# Patient Record
Sex: MALE | Race: WHITE | Employment: OTHER | URBAN - METROPOLITAN AREA
[De-identification: names, ages, dates, MRNs, and addresses within clinical notes are randomized per-mention and may not be internally consistent; named-entity substitution may affect disease eponyms.]

---

## 2017-01-27 ENCOUNTER — HOSPITAL ENCOUNTER (OUTPATIENT)
Dept: INFUSION THERAPY | Age: 62
Discharge: HOME | End: 2017-01-27
Payer: COMMERCIAL

## 2017-01-27 VITALS
RESPIRATION RATE: 18 BRPM | HEART RATE: 70 BPM | DIASTOLIC BLOOD PRESSURE: 82 MMHG | TEMPERATURE: 97 F | OXYGEN SATURATION: 99 % | SYSTOLIC BLOOD PRESSURE: 144 MMHG

## 2017-01-27 LAB
BASOPHILS # BLD AUTO: 0.1 K/UL (ref 0–0.1)
BASOPHILS # BLD: 1 % (ref 0–1)
EOSINOPHIL # BLD: 0.2 K/UL (ref 0–0.4)
EOSINOPHIL NFR BLD: 2 % (ref 0–7)
ERYTHROCYTE [DISTWIDTH] IN BLOOD BY AUTOMATED COUNT: 16.8 % (ref 11.5–14.5)
HCT VFR BLD AUTO: 48.5 % (ref 36.6–50.3)
HGB BLD-MCNC: 15.8 G/DL (ref 12.1–17)
LYMPHOCYTES # BLD AUTO: 31 % (ref 12–49)
LYMPHOCYTES # BLD: 2.8 K/UL (ref 0.8–3.5)
MCH RBC QN AUTO: 23.6 PG (ref 26–34)
MCHC RBC AUTO-ENTMCNC: 32.6 G/DL (ref 30–36.5)
MCV RBC AUTO: 72.4 FL (ref 80–99)
MONOCYTES # BLD: 0.7 K/UL (ref 0–1)
MONOCYTES NFR BLD AUTO: 7 % (ref 5–13)
NEUTS SEG # BLD: 5.5 K/UL (ref 1.8–8)
NEUTS SEG NFR BLD AUTO: 59 % (ref 32–75)
PLATELET # BLD AUTO: 550 K/UL (ref 150–400)
RBC # BLD AUTO: 6.7 M/UL (ref 4.1–5.7)
WBC # BLD AUTO: 9.2 K/UL (ref 4.1–11.1)

## 2017-01-27 PROCEDURE — 85025 COMPLETE CBC W/AUTO DIFF WBC: CPT | Performed by: NURSE PRACTITIONER

## 2017-01-27 PROCEDURE — 36415 COLL VENOUS BLD VENIPUNCTURE: CPT | Performed by: NURSE PRACTITIONER

## 2017-01-27 PROCEDURE — 99195 PHLEBOTOMY: CPT

## 2017-01-27 NOTE — PROGRESS NOTES
Outpatient Infusion Center Progress Note    1600 Pt admit to [facility] for Therapeutic phlebotomy ambulatory in stable condition. Assessment completed. No new concerns voiced. CBC drawn and sent for processing. Labs revealed a phlebotomy is needed. Phlebotomy performed in the left arm and 450cc drained without difficulty. Needle removed and pressure applied for 10 minutes. Visit Vitals    /82    Pulse 70    Temp 97 °F (36.1 °C)    Resp 18    SpO2 99%       1735 Pt tolerated treatment well. D/c home ambulatory in no distress. Pt aware of next appointment scheduled for 2/10/17 at 10am    Recent Results (from the past 12 hour(s))   CBC WITH AUTOMATED DIFF    Collection Time: 01/27/17  4:07 PM   Result Value Ref Range    WBC 9.2 4.1 - 11.1 K/uL    RBC 6.70 (H) 4.10 - 5.70 M/uL    HGB 15.8 12.1 - 17.0 g/dL    HCT 48.5 36.6 - 50.3 %    MCV 72.4 (L) 80.0 - 99.0 FL    MCH 23.6 (L) 26.0 - 34.0 PG    MCHC 32.6 30.0 - 36.5 g/dL    RDW 16.8 (H) 11.5 - 14.5 %    PLATELET 849 (H) 489 - 400 K/uL    NEUTROPHILS 59 32 - 75 %    LYMPHOCYTES 31 12 - 49 %    MONOCYTES 7 5 - 13 %    EOSINOPHILS 2 0 - 7 %    BASOPHILS 1 0 - 1 %    ABS. NEUTROPHILS 5.5 1.8 - 8.0 K/UL    ABS. LYMPHOCYTES 2.8 0.8 - 3.5 K/UL    ABS. MONOCYTES 0.7 0.0 - 1.0 K/UL    ABS. EOSINOPHILS 0.2 0.0 - 0.4 K/UL    ABS.  BASOPHILS 0.1 0.0 - 0.1 K/UL

## 2017-02-06 RX ORDER — LISINOPRIL 5 MG/1
TABLET ORAL
Qty: 360 TAB | Refills: 0 | Status: SHIPPED | OUTPATIENT
Start: 2017-02-06 | End: 2017-06-19 | Stop reason: SDUPTHER

## 2017-02-06 RX ORDER — TRAMADOL HYDROCHLORIDE 50 MG/1
TABLET ORAL
Qty: 30 TAB | Refills: 0 | OUTPATIENT
Start: 2017-02-06 | End: 2018-12-04 | Stop reason: SDUPTHER

## 2017-02-10 ENCOUNTER — HOSPITAL ENCOUNTER (OUTPATIENT)
Dept: INFUSION THERAPY | Age: 62
Discharge: HOME | End: 2017-02-10
Payer: COMMERCIAL

## 2017-02-10 VITALS
HEART RATE: 72 BPM | DIASTOLIC BLOOD PRESSURE: 77 MMHG | OXYGEN SATURATION: 98 % | RESPIRATION RATE: 18 BRPM | SYSTOLIC BLOOD PRESSURE: 131 MMHG | TEMPERATURE: 97.1 F

## 2017-02-10 LAB
BASOPHILS # BLD AUTO: 0.1 K/UL (ref 0–0.1)
BASOPHILS # BLD: 1 % (ref 0–1)
DIFFERENTIAL METHOD BLD: ABNORMAL
EOSINOPHIL # BLD: 0.3 K/UL (ref 0–0.4)
EOSINOPHIL NFR BLD: 3 % (ref 0–7)
ERYTHROCYTE [DISTWIDTH] IN BLOOD BY AUTOMATED COUNT: 17.5 % (ref 11.5–14.5)
HCT VFR BLD AUTO: 42.8 % (ref 36.6–50.3)
HGB BLD-MCNC: 13.2 G/DL (ref 12.1–17)
LYMPHOCYTES # BLD AUTO: 26 % (ref 12–49)
LYMPHOCYTES # BLD: 2.2 K/UL (ref 0.8–3.5)
MCH RBC QN AUTO: 22.6 PG (ref 26–34)
MCHC RBC AUTO-ENTMCNC: 30.8 G/DL (ref 30–36.5)
MCV RBC AUTO: 73.4 FL (ref 80–99)
MONOCYTES # BLD: 0.7 K/UL (ref 0–1)
MONOCYTES NFR BLD AUTO: 8 % (ref 5–13)
NEUTS SEG # BLD: 5.1 K/UL (ref 1.8–8)
NEUTS SEG NFR BLD AUTO: 62 % (ref 32–75)
PLATELET # BLD AUTO: 531 K/UL (ref 150–400)
RBC # BLD AUTO: 5.83 M/UL (ref 4.1–5.7)
RBC MORPH BLD: ABNORMAL
WBC # BLD AUTO: 8.4 K/UL (ref 4.1–11.1)
WBC MORPH BLD: ABNORMAL

## 2017-02-10 PROCEDURE — 36415 COLL VENOUS BLD VENIPUNCTURE: CPT | Performed by: INTERNAL MEDICINE

## 2017-02-10 PROCEDURE — 85025 COMPLETE CBC W/AUTO DIFF WBC: CPT | Performed by: INTERNAL MEDICINE

## 2017-02-10 NOTE — PROGRESS NOTES
1600 Pt admit to [place] for Therapeutic Phlebotomy ambulatory in stable condition. Assessment completed. No new concerns voiced. Labs drawn peripherally and sent for processing. Visit Vitals    /77 (BP 1 Location: Left arm, BP Patient Position: At rest)    Pulse 72    Temp 97.1 °F (36.2 °C)    Resp 18    SpO2 98%       Medications:  none    1710 spoke with [facility], NP no treatment needed today. Patient aware of next appointment on 2/24/17. Recent Results (from the past 12 hour(s))   CBC WITH AUTOMATED DIFF    Collection Time: 02/10/17  4:11 PM   Result Value Ref Range    WBC 8.4 4.1 - 11.1 K/uL    RBC 5.83 (H) 4.10 - 5.70 M/uL    HGB 13.2 12.1 - 17.0 g/dL    HCT 42.8 36.6 - 50.3 %    MCV 73.4 (L) 80.0 - 99.0 FL    MCH 22.6 (L) 26.0 - 34.0 PG    MCHC 30.8 30.0 - 36.5 g/dL    RDW 17.5 (H) 11.5 - 14.5 %    PLATELET 606 (H) 671 - 400 K/uL    NEUTROPHILS 62 32 - 75 %    LYMPHOCYTES 26 12 - 49 %    MONOCYTES 8 5 - 13 %    EOSINOPHILS 3 0 - 7 %    BASOPHILS 1 0 - 1 %    ABS. NEUTROPHILS 5.1 1.8 - 8.0 K/UL    ABS. LYMPHOCYTES 2.2 0.8 - 3.5 K/UL    ABS. MONOCYTES 0.7 0.0 - 1.0 K/UL    ABS. EOSINOPHILS 0.3 0.0 - 0.4 K/UL    ABS.  BASOPHILS 0.1 0.0 - 0.1 K/UL    DF SMEAR SCANNED      RBC COMMENTS ANISOCYTOSIS  1+        RBC COMMENTS MICROCYTOSIS  1+        RBC COMMENTS HYPOCHROMIA  2+        RBC COMMENTS DARYL CELLS  PRESENT        RBC COMMENTS POLYCHROMASIA  PRESENT        WBC COMMENTS REACTIVE LYMPHS

## 2017-02-24 ENCOUNTER — APPOINTMENT (OUTPATIENT)
Dept: INFUSION THERAPY | Age: 62
End: 2017-02-24
Payer: COMMERCIAL

## 2017-03-01 ENCOUNTER — TELEPHONE (OUTPATIENT)
Dept: ONCOLOGY | Age: 62
End: 2017-03-01

## 2017-03-02 ENCOUNTER — TELEPHONE (OUTPATIENT)
Dept: ONCOLOGY | Age: 62
End: 2017-03-02

## 2017-03-02 NOTE — TELEPHONE ENCOUNTER
Patient should follow-up with PCP/[facility] for chronic conditions for which they have followed him in the past. He is also due for follow-up appointment with Dr. Dennis Baxter, please call to schedule.

## 2017-03-02 NOTE — TELEPHONE ENCOUNTER
Patient is requesting a standing order for labs be faxed to [facility] on [address], [place]  Fax: [phone]  Phone [phone]    Thanks you.

## 2017-03-02 NOTE — TELEPHONE ENCOUNTER
Standing order lab complete and to [facility] for scanning. HIPAA verified. Advised lab order to be faxed to [employer] per preference. Stated ongoing stabbing pain in head and behind knee for several months. Reported ongoing visual changes for 5 years. Stated has been seen by [place] for this for past 5 years. Advised to follow with PCP, but would forward to provider.   Verbalized understanding.    (8 min)

## 2017-03-06 ENCOUNTER — DOCUMENTATION ONLY (OUTPATIENT)
Dept: ONCOLOGY | Age: 62
End: 2017-03-06

## 2017-03-06 NOTE — PROGRESS NOTES
Per PSR patient calling and stated [facility] did not rec standing orders. Re-faxed with confirmation.

## 2017-03-07 LAB
BASOPHILS # BLD AUTO: 0.1 X10E3/UL (ref 0–0.2)
BASOPHILS NFR BLD AUTO: 1 %
EOSINOPHIL # BLD AUTO: 0.2 X10E3/UL (ref 0–0.4)
EOSINOPHIL NFR BLD AUTO: 2 %
ERYTHROCYTE [DISTWIDTH] IN BLOOD BY AUTOMATED COUNT: 16.9 % (ref 12.3–15.4)
HCT VFR BLD AUTO: 47.6 % (ref 37.5–51)
HGB BLD-MCNC: 15 G/DL (ref 12.6–17.7)
IMM GRANULOCYTES # BLD: 0 X10E3/UL (ref 0–0.1)
IMM GRANULOCYTES NFR BLD: 0 %
LYMPHOCYTES # BLD AUTO: 2.7 X10E3/UL (ref 0.7–3.1)
LYMPHOCYTES NFR BLD AUTO: 28 %
MCH RBC QN AUTO: 22.8 PG (ref 26.6–33)
MCHC RBC AUTO-ENTMCNC: 31.5 G/DL (ref 31.5–35.7)
MCV RBC AUTO: 72 FL (ref 79–97)
MONOCYTES # BLD AUTO: 0.8 X10E3/UL (ref 0.1–0.9)
MONOCYTES NFR BLD AUTO: 9 %
NEUTROPHILS # BLD AUTO: 5.6 X10E3/UL (ref 1.4–7)
NEUTROPHILS NFR BLD AUTO: 60 %
PLATELET # BLD AUTO: 616 X10E3/UL (ref 150–379)
RBC # BLD AUTO: 6.59 X10E6/UL (ref 4.14–5.8)
WBC # BLD AUTO: 9.4 X10E3/UL (ref 3.4–10.8)

## 2017-03-08 ENCOUNTER — TELEPHONE (OUTPATIENT)
Dept: ONCOLOGY | Age: 62
End: 2017-03-08

## 2017-03-08 ENCOUNTER — OFFICE VISIT (OUTPATIENT)
Dept: ONCOLOGY | Age: 62
End: 2017-03-08

## 2017-03-08 VITALS
DIASTOLIC BLOOD PRESSURE: 78 MMHG | HEART RATE: 71 BPM | TEMPERATURE: 97.9 F | RESPIRATION RATE: 16 BRPM | HEIGHT: 70 IN | WEIGHT: 204.2 LBS | BODY MASS INDEX: 29.23 KG/M2 | SYSTOLIC BLOOD PRESSURE: 118 MMHG | OXYGEN SATURATION: 97 %

## 2017-03-08 DIAGNOSIS — D45 POLYCYTHEMIA VERA (HCC): Primary | Chronic | ICD-10-CM

## 2017-03-08 DIAGNOSIS — D75.839 THROMBOCYTOSIS: ICD-10-CM

## 2017-03-08 NOTE — MR AVS SNAPSHOT
Visit Information Date & Time Provider Department Dept. Phone Encounter #  
 3/8/2017  1:30 PM Angel Elizalde, [address] Oncology at [facility] [phone] Follow-up Instructions Return in about 6 months (around 9/8/2017). Routing History Follow-up and Disposition History Upcoming Health Maintenance Date Due Hepatitis C Screening 1955 DTaP/Tdap/Td series (1 - Tdap) 7/24/2004 Pneumococcal 19-64 Highest Risk (3 of 3 - PCV13) 1/12/2017 COLONOSCOPY 1/19/2025 Allergies as of 3/8/2017  Review Complete On: 3/8/2017 By: Angel Elizalde,  Severity Noted Reaction Type Reactions Sulfur High 06/15/2011   Systemic Anaphylaxis, Hives, Seizures Zyprexa [Olanzapine] High 07/23/2013    Anaphylaxis Celexa [Citalopram]  07/23/2013    Other (comments) groggy Clindamycin  07/23/2013    Nausea and Vomiting Lisinopril  02/23/2015    Other (comments) Prozac [Fluoxetine]  07/23/2013    Anxiety Risperidone  07/23/2013    Anxiety Current Immunizations  Reviewed on 1/27/2017 Name Date Influenza High Dose Vaccine PF 11/12/2015 Influenza Vaccine 10/23/2012 Influenza Vaccine Janyth Charlie) 10/13/2016 Influenza Vaccine PF 9/30/2013 Pneumococcal Polysaccharide (PPSV-23) 1/12/2016 Pneumococcal Vaccine (Unspecified Type) 7/23/2012 Td 7/23/2004 Not reviewed this visit You Were Diagnosed With   
  
 Codes Comments Polycythemia vera ([email or web address] 75.)    -  Primary ICD-10-CM: F55 ICD-9-CM: 331. 4 Thrombocytosis ([facility]     ICD-10-CM: D47.3 ICD-9-CM: 238.71 Vitals BP Pulse Temp Resp Height(growth percentile) Weight(growth percentile) 118/78 (BP 1 Location: Right arm, BP Patient Position: Sitting) 71 97.9 °F (36.6 °C) (Oral) 16 5' 10\" (1.778 m) 204 lb 3.2 oz (92.6 kg) SpO2 BMI Smoking Status 97% 29.3 kg/m2 Current Every Day Smoker Vitals History BMI and BSA Data Body Mass Index Body Surface Area  
 29.3 kg/m 2 2.14 m 2 Preferred Pharmacy Pharmacy Name Phone [facility] [facility],  Hermelinda Longo [address] [place] [address] [phone] Your Updated Medication List  
  
   
This list is accurate as of: 3/8/17  2:14 PM.  Always use your most recent med list.  
  
  
  
  
 aspirin delayed-release 81 mg tablet Take  by mouth daily. Calcium-Mag-Vit B6-D3-Minerals 006-01-6-125 wy-nb-oc-unit Tab Take  by mouth daily. CENTRUM SILVER ULTRA MEN'S PO Take  by mouth.  
  
 cyanocobalamin 1,000 mcg tablet Take 1,000 mcg by mouth daily. lisinopril 5 mg tablet Commonly known as:  PRINIVIL, ZESTRIL  
TAKE 1 TO 4 TABLETS BY MOUTH DAILY FOR BLOOD PRESSURE READINGS AS DIRECTED  
  
 tamsulosin 0.4 mg capsule Commonly known as:  FLOMAX Take 0.4 mg by mouth daily. traMADol 50 mg tablet Commonly known as:  ULTRAM  
TAKE 1 TABLET BY MOUTH EVERY 6 HOURS AS NEEDED FOR PAIN. MAX 200MG IN 1 DAY. VITAMIN D3 2,000 unit Tab Generic drug:  cholecalciferol (vitamin D3) Take  by mouth daily. We Performed the Following FERRITIN [18866 CPT(R)] IRON PROFILE S5493262 CPT(R)] METABOLIC PANEL, COMPREHENSIVE [82269 CPT(R)] Follow-up Instructions Return in about 6 months (around 9/8/2017). To-Do List   
 03/24/2017 4:00 PM  
  Appointment with [facility] ([phone])  
  
 04/21/2017 4:00 PM  
  Appointment with [facility] ([phone])  
  
 05/19/2017 4:00 PM  
  Appointment with [facility] ([phone])  
  
 06/16/2017 4:00 PM  
  Appointment with [facility] ([phone]) Introducing [facility]! Dear Cesar : Thank you for requesting a [employer] account. Our records indicate that you already have an active [employer] account.   You can access your account anytime at [email or web address]. [email or web address]/"[employer] Did you know that you can access your hospital and ER discharge instructions at any time in [employer]? You can also review all of your test results from your hospital stay or ER visit. Additional Information If you have questions, please visit the Frequently Asked Questions section of the [employer] website at [email or web address] Remember, [employer] is NOT to be used for urgent needs. For medical emergencies, dial 911. Now available from your iPhone and Android! Please provide this summary of care documentation to your next provider. Your primary care clinician is listed as TIMUR LANDAVERDE. If you have any questions after today's visit, please call [phone].

## 2017-03-08 NOTE — PROGRESS NOTES
HEME/ONC CONSULT     Blue Rodas is a 63 y.o. 1955 male and presents with Other (Polycythemia)    CC  Polycythemia chronic 2006    HPI  Pt here for polycythemia 6 mo f/u on phlebotomy. Pt has multiple medical problems and sees private PCP/ doctors and [address] [place] [address]. Pt sees heme at [address] [place] [address] also twice a year still. Last visit:  Pt is a [facility] pt. Repeat bone marrow showed PV again. Pt has report. Pt has multiple symptoms chronically. Chronic HAs/ fatigue/ numbness/ vertigo/ anxiety. Pt is scared of falls. Pt goes to [facility]. Pt sees PCP outside [address] [place] [address] regularly. Pt monitors his H/H and phlebotomy based on symptoms. Pt is doing well here overall. DX   Encounter Diagnoses   Name Primary?     Polycythemia vera ([facility] Yes    Thrombocytosis (HCC)         Past Medical History:   Diagnosis Date    Depression     GERD (gastroesophageal reflux disease)     Hypertension     Liver disease 1984    Fatty liver    Other ill-defined conditions(799.89)     gallstones    Polycythemia vera(238.4) 4/29/2013    Prostatitis     Sleep apnea 12/2011    doesn't use c-pap;  lost 25 lbs and has improved    Vertigo      Past Surgical History:   Procedure Laterality Date    HX CHOLECYSTECTOMY      HX COLONOSCOPY      HX ORTHOPAEDIC  1970    left wrist compound fracture    HX OTHER SURGICAL  2013    molar removal (dental)     Social History     Social History    Marital status:      Spouse name: N/A    Number of children: N/A    Years of education: N/A     Social History Main Topics    Smoking status: Current Every Day Smoker     Packs/day: 1.00     Years: 40.00    Smokeless tobacco: Never Used    Alcohol use No    Drug use: Yes     Special: Marijuana      Comment: rare    Sexual activity: No     Other Topics Concern    None     Social History Narrative     Family History   Problem Relation Age of Onset    Cancer Father     Heart Disease Father     Heart Disease Mother     Kidney Disease Mother     Diabetes Brother        Current Outpatient Prescriptions   Medication Sig Dispense Refill    traMADol (ULTRAM) 50 mg tablet TAKE 1 TABLET BY MOUTH EVERY 6 HOURS AS NEEDED FOR PAIN. MAX 200MG IN 1 DAY. 30 Tab 0    lisinopril (PRINIVIL, ZESTRIL) 5 mg tablet TAKE 1 TO 4 TABLETS BY MOUTH DAILY FOR BLOOD PRESSURE READINGS AS DIRECTED 360 Tab 0    cholecalciferol, vitamin D3, (VITAMIN D3) 2,000 unit tab Take  by mouth daily.  aspirin delayed-release 81 mg tablet Take  by mouth daily.  cyanocobalamin 1,000 mcg tablet Take 1,000 mcg by mouth daily.  Calcium-Mag-Vit B6-D3-Minerals 030-51-3-243 ll-yx-af-unit tab Take  by mouth daily.  tamsulosin (FLOMAX) 0.4 mg capsule Take 0.4 mg by mouth daily.  MULTIVIT-MIN/FA/LYCOPENE/LUT (CENTRUM SILVER ULTRA MEN'S PO) Take  by mouth. Allergies   Allergen Reactions    Sulfur Anaphylaxis, Hives and Seizures    Zyprexa [Olanzapine] Anaphylaxis    Celexa [Citalopram] Other (comments)     groggy    Clindamycin Nausea and Vomiting    Lisinopril Other (comments)    Prozac [Fluoxetine] Anxiety    Risperidone Anxiety       Review of Systems    A comprehensive review of systems was negative except for: per HPI  multiple symptoms     Objective:  Visit Vitals    /78 (BP 1 Location: Right arm, BP Patient Position: Sitting)    Pulse 71    Temp 97.9 °F (36.6 °C) (Oral)    Resp 16    Ht 5' 10\" (1.778 m)    Wt 204 lb 3.2 oz (92.6 kg)    SpO2 97%    BMI 29.3 kg/m2         Physical Exam:   General appearance - alert, well nourished  Mental status - appropriately conversant. EYE conj clear  Mouth - mucous membranes moist  Neck - supple  Ext-no pedal edema noted  Skin-Warm and dry. Neuro -nonfocal      Diagnostic Imaging   reviewed  Results for orders placed during the hospital encounter of 04/14/14   XR CHEST PA LAT    Narrative **Final Report**       ICD Codes / Adm. Diagnosis: 789.09  787.02 / pre op    Examination:  CR CHEST PA AND LATERAL  - [id number] - Apr 14 2014 10:18AM  Accession No:  [id number]  Reason:  preop      REPORT:  Indication: Congestion, cough, hypertension. Exam: PA and lateral views of the chest.    There is no prior study for direct comparison. Findings: Cardiomediastinal silhouette is within normal limits. Lungs are   clear bilaterally. Pleural spaces are normal. Osseous structures are intact. IMPRESSION: No acute cardiopulmonary disease. Signing/Reading Doctor: MELISSA Pino ([id number])    Approved: MELISSA RICARDO ([id number])  Apr 14 2014 10:28AM                                          Lab Results    reviewed  Lab Results   Component Value Date/Time    WBC 9.4 03/06/2017 09:21 AM    HGB 15.0 03/06/2017 09:21 AM    HCT 47.6 03/06/2017 09:21 AM    PLATELET 942 43/26/8101 09:21 AM    MCV 72 03/06/2017 09:21 AM       Lab Results   Component Value Date/Time    Sodium 139 09/16/2016 05:18 PM    Potassium 4.0 09/16/2016 05:18 PM    Chloride 106 09/16/2016 05:18 PM    CO2 23 09/16/2016 05:18 PM    Anion gap 10 09/16/2016 05:18 PM    Glucose 204 09/16/2016 05:18 PM    BUN 18 09/16/2016 05:18 PM    Creatinine 0.95 09/16/2016 05:18 PM    BUN/Creatinine ratio 19 09/16/2016 05:18 PM    GFR est AA >60 09/16/2016 05:18 PM    GFR est non-AA >60 09/16/2016 05:18 PM    Calcium 8.4 09/16/2016 05:18 PM    AST (SGOT) 14 09/16/2016 05:18 PM    Alk. phosphatase 76 09/16/2016 05:18 PM    Protein, total 6.7 09/16/2016 05:18 PM    Albumin 3.7 09/16/2016 05:18 PM    Globulin 3.0 09/16/2016 05:18 PM    A-G Ratio 1.2 09/16/2016 05:18 PM    ALT (SGPT) 32 09/16/2016 05:18 PM       .    Assessment/Plan:     Enma Prince is a 56 y.o. 1955 male and presents with Abnormal Lab Results  . CC  poythyemia    HPI  Pt here for polycythemia f/u. DX  Polycythemia vera jose 2 +    1. chronic polycythemia vera jose 2 + dx at [facility] years ago. Pt had a repeat BM here that showed PV. H/H has been stable.   Platelets up a bit so will check iron studies. Continue same treatment plan.      labs q 4 weeks    Phlebotomy for 44 or greater per pt request as was done at the [facility] prior to here. Goal is to spread out phlebotomy as possible. 2.  Other chronic / medical problems per PCP/ [facility]. Call if questions. F/u with me in 6 months. ICD-10-CM ICD-9-CM    1. Polycythemia vera ([facility] F07 203.0 METABOLIC PANEL, COMPREHENSIVE      FERRITIN      IRON PROFILE   2. [facility]romb[facility] ([facility] D47.3 238.71      Orders Placed This Encounter    METABOLIC PANEL, COMPREHENSIVE    FERRITIN    IRON PROFILE       routine labs ordered, call if any problems  There are no Patient Instructions on file for this visit. Follow-up Disposition:  Return in about 6 months (around 9/8/2017).     Haydee Padilla DO

## 2017-03-08 NOTE — TELEPHONE ENCOUNTER
Voicemail: 3/6/17 @ 9:50 am    Patient calling to thank Nadia Chaidez for writing the fax to [facility], he got it done this morning. Patient also states that something is happening behind his knees. He is having a pain there. He is also experiencing numbness in 4 fingers on both hands. He wants to report this as it is alarming and is waking him up from his sleep. He has also had sever headaches for the last 2 weeks. Please call the patient back at [phone].  Thanks

## 2017-03-08 NOTE — PROGRESS NOTES
Chief Complaint   Patient presents with    Other     Polycythemia     1. Have you been to the ER, urgent care clinic since your last visit? Hospitalized since your last visit? No    2. Have you seen or consulted any other health care providers outside of the [address] [place] [address] since your last visit? Include any pap smears or colon screening.  [facility] 10/2016

## 2017-03-09 ENCOUNTER — TELEPHONE (OUTPATIENT)
Dept: ONCOLOGY | Age: 62
End: 2017-03-09

## 2017-03-09 ENCOUNTER — HOSPITAL ENCOUNTER (OUTPATIENT)
Dept: INFUSION THERAPY | Age: 62
Discharge: HOME | End: 2017-03-09
Payer: COMMERCIAL

## 2017-03-09 VITALS
HEART RATE: 78 BPM | TEMPERATURE: 97.7 F | RESPIRATION RATE: 18 BRPM | DIASTOLIC BLOOD PRESSURE: 78 MMHG | SYSTOLIC BLOOD PRESSURE: 139 MMHG | OXYGEN SATURATION: 98 %

## 2017-03-09 LAB
ALBUMIN SERPL BCP-MCNC: 4 G/DL (ref 3.5–5)
ALBUMIN/GLOB SERPL: 1.1 {RATIO} (ref 1.1–2.2)
ALP SERPL-CCNC: 93 U/L (ref 45–117)
ALT SERPL-CCNC: 34 U/L (ref 12–78)
ANION GAP BLD CALC-SCNC: 10 MMOL/L (ref 5–15)
AST SERPL W P-5'-P-CCNC: 11 U/L (ref 15–37)
BILIRUB SERPL-MCNC: 0.4 MG/DL (ref 0.2–1)
BUN SERPL-MCNC: 21 MG/DL (ref 6–20)
BUN/CREAT SERPL: 17 (ref 12–20)
CALCIUM SERPL-MCNC: 9.2 MG/DL (ref 8.5–10.1)
CHLORIDE SERPL-SCNC: 102 MMOL/L (ref 97–108)
CO2 SERPL-SCNC: 27 MMOL/L (ref 21–32)
CREAT SERPL-MCNC: 1.21 MG/DL (ref 0.7–1.3)
FERRITIN SERPL-MCNC: 6 NG/ML (ref 26–388)
GLOBULIN SER CALC-MCNC: 3.5 G/DL (ref 2–4)
GLUCOSE SERPL-MCNC: 138 MG/DL (ref 65–100)
IRON SATN MFR SERPL: 4 % (ref 20–50)
IRON SERPL-MCNC: 21 UG/DL (ref 35–150)
POTASSIUM SERPL-SCNC: 4.1 MMOL/L (ref 3.5–5.1)
PROT SERPL-MCNC: 7.5 G/DL (ref 6.4–8.2)
SODIUM SERPL-SCNC: 139 MMOL/L (ref 136–145)
TIBC SERPL-MCNC: 473 UG/DL (ref 250–450)

## 2017-03-09 PROCEDURE — 36415 COLL VENOUS BLD VENIPUNCTURE: CPT | Performed by: INTERNAL MEDICINE

## 2017-03-09 PROCEDURE — 80053 COMPREHEN METABOLIC PANEL: CPT | Performed by: INTERNAL MEDICINE

## 2017-03-09 PROCEDURE — 83540 ASSAY OF IRON: CPT | Performed by: INTERNAL MEDICINE

## 2017-03-09 PROCEDURE — 82728 ASSAY OF FERRITIN: CPT | Performed by: INTERNAL MEDICINE

## 2017-03-09 PROCEDURE — 99195 PHLEBOTOMY: CPT

## 2017-03-09 NOTE — PROGRESS NOTES
[facility]ataly FRANKEL Progress Note    Date: 2017    Name: Aracelis Perry    MRN: [id number]         : 1955      Mr. Dorisann Prader was assessed and education was provided. Pt arrived to infusion center for scheduled therapeutic phlebotomy. Labs drawn on 2017 Hematocrit 47.6, orders received to perform phlebotomy until Hct less than 45. Pt with some generalized complaints of numbness and tingling in hands and painful acne like rash to face, one raised bump noted above right eye, no other noted. Pt recently referred to neurology for numbness and tingling. Mr. Alf Oconnell vitals were reviewed and patient was observed for 5 minutes prior to treatment. Visit Vitals    /78    Pulse 78    Temp 97.7 °F (36.5 °C)    Resp 18    SpO2 98%       Lab results were obtained and reviewed. Recent Results (from the past 12 hour(s))   METABOLIC PANEL, COMPREHENSIVE    Collection Time: 17  3:15 PM   Result Value Ref Range    Sodium 139 136 - 145 mmol/L    Potassium 4.1 3.5 - 5.1 mmol/L    Chloride 102 97 - 108 mmol/L    CO2 27 21 - 32 mmol/L    Anion gap 10 5 - 15 mmol/L    Glucose 138 (H) 65 - 100 mg/dL    BUN 21 (H) 6 - 20 MG/DL    Creatinine 1.21 0.70 - 1.30 MG/DL    BUN/Creatinine ratio 17 12 - 20      GFR est AA >60 >60 ml/min/1.73m2    GFR est non-AA >60 >60 ml/min/1.73m2    Calcium 9.2 8.5 - 10.1 MG/DL    Bilirubin, total 0.4 0.2 - 1.0 MG/DL    ALT (SGPT) 34 12 - 78 U/L    AST (SGOT) 11 (L) 15 - 37 U/L    Alk. phosphatase 93 45 - 117 U/L    Protein, total 7.5 6.4 - 8.2 g/dL    Albumin 4.0 3.5 - 5.0 g/dL    Globulin 3.5 2.0 - 4.0 g/dL    A-G Ratio 1.1 1.1 - 2.2     IRON PROFILE    Collection Time: 17  3:15 PM   Result Value Ref Range    Iron 21 (L) 35 - 150 ug/dL    TIBC 473 (H) 250 - 450 ug/dL    Iron % saturation 4 (L) 20 - 50 %   FERRITIN    Collection Time: 17  3:15 PM   Result Value Ref Range    Ferritin 6 (L) 26 - 388 NG/ML     # 16 gauge started to left forearm X 1 attempt.  Phlebotomy started at 1310 ended at 1325, 1 unit whole blood removed as ordered. Additional labs drawn from line during phlebotomy. IV site discontinued, wrapped in COBAN    Pt drank appr 200 cc water during treatment, no orthostatic changes noted to blood pressure upon discharge. Mr. Dorisann Prader tolerated ,and had no complaints. Patient armband removed and shredded. Mr. Dorisann Prader was discharged from [place] [address] in stable condition at [phone] 16. He is to return on 03/24/2017 at 1600 for his next appointment.     Kelsy Tijerina RN  March 9, 2017  4:23 PM

## 2017-03-09 NOTE — TELEPHONE ENCOUNTER
Voicemail: 3/9/17 @ 2:45 pm     Patient calling to let us know that the insurance on the lab slip is incorrect. The Julito Harjinder is correct but the plan type is incorrect. It is supposed to be a HMO instead on PPO.

## 2017-03-24 ENCOUNTER — HOSPITAL ENCOUNTER (OUTPATIENT)
Dept: INFUSION THERAPY | Age: 62
Discharge: HOME | End: 2017-03-24
Payer: COMMERCIAL

## 2017-03-24 VITALS
RESPIRATION RATE: 18 BRPM | DIASTOLIC BLOOD PRESSURE: 75 MMHG | HEART RATE: 63 BPM | OXYGEN SATURATION: 97 % | TEMPERATURE: 98.6 F | SYSTOLIC BLOOD PRESSURE: 122 MMHG

## 2017-03-24 LAB
BASOPHILS # BLD AUTO: 0.1 K/UL (ref 0–0.1)
BASOPHILS # BLD: 1 % (ref 0–1)
EOSINOPHIL # BLD: 0.2 K/UL (ref 0–0.4)
EOSINOPHIL NFR BLD: 2 % (ref 0–7)
ERYTHROCYTE [DISTWIDTH] IN BLOOD BY AUTOMATED COUNT: 17.2 % (ref 11.5–14.5)
HCT VFR BLD AUTO: 42.3 % (ref 36.6–50.3)
HGB BLD-MCNC: 13.3 G/DL (ref 12.1–17)
LYMPHOCYTES # BLD AUTO: 26 % (ref 12–49)
LYMPHOCYTES # BLD: 2.5 K/UL (ref 0.8–3.5)
MCH RBC QN AUTO: 22.4 PG (ref 26–34)
MCHC RBC AUTO-ENTMCNC: 31.4 G/DL (ref 30–36.5)
MCV RBC AUTO: 71.3 FL (ref 80–99)
MONOCYTES # BLD: 0.5 K/UL (ref 0–1)
MONOCYTES NFR BLD AUTO: 5 % (ref 5–13)
NEUTS SEG # BLD: 6.4 K/UL (ref 1.8–8)
NEUTS SEG NFR BLD AUTO: 66 % (ref 32–75)
PLATELET # BLD AUTO: 554 K/UL (ref 150–400)
RBC # BLD AUTO: 5.93 M/UL (ref 4.1–5.7)
WBC # BLD AUTO: 9.7 K/UL (ref 4.1–11.1)

## 2017-03-24 PROCEDURE — 36415 COLL VENOUS BLD VENIPUNCTURE: CPT | Performed by: INTERNAL MEDICINE

## 2017-03-24 PROCEDURE — 85025 COMPLETE CBC W/AUTO DIFF WBC: CPT | Performed by: INTERNAL MEDICINE

## 2017-03-24 NOTE — PROGRESS NOTES
1555 Pt admit to [facility] for Therapeutic Phlebotomy ambulatory in stable condition. Assessment completed. No new concerns voiced. Labs drawn per order and sent for processing. Labs reviewed no phlebotomy required. Visit Vitals    /75 (BP 1 Location: Left arm, BP Patient Position: Sitting)    Pulse 63    Temp 98.6 °F (37 °C)    Resp 18    SpO2 97%         1630 Pt tolerated treatment well. D/c home ambulatory in no distress. Pt aware of next appointment scheduled for 4/21/17. Recent Results (from the past 12 hour(s))   CBC WITH AUTOMATED DIFF    Collection Time: 03/24/17  4:02 PM   Result Value Ref Range    WBC 9.7 4.1 - 11.1 K/uL    RBC 5.93 (H) 4.10 - 5.70 M/uL    HGB 13.3 12.1 - 17.0 g/dL    HCT 42.3 36.6 - 50.3 %    MCV 71.3 (L) 80.0 - 99.0 FL    MCH 22.4 (L) 26.0 - 34.0 PG    MCHC 31.4 30.0 - 36.5 g/dL    RDW 17.2 (H) 11.5 - 14.5 %    PLATELET 073 (H) 248 - 400 K/uL    NEUTROPHILS 66 32 - 75 %    LYMPHOCYTES 26 12 - 49 %    MONOCYTES 5 5 - 13 %    EOSINOPHILS 2 0 - 7 %    BASOPHILS 1 0 - 1 %    ABS. NEUTROPHILS 6.4 1.8 - 8.0 K/UL    ABS. LYMPHOCYTES 2.5 0.8 - 3.5 K/UL    ABS. MONOCYTES 0.5 0.0 - 1.0 K/UL    ABS. EOSINOPHILS 0.2 0.0 - 0.4 K/UL    ABS.  BASOPHILS 0.1 0.0 - 0.1 K/UL

## 2017-04-20 ENCOUNTER — TELEPHONE (OUTPATIENT)
Dept: ONCOLOGY | Age: 62
End: 2017-04-20

## 2017-04-20 NOTE — TELEPHONE ENCOUNTER
Received call from patient, HIPAA verified. Patient states he had appointment at the [address] [place] [address] today and they zev labs. Has phlebotomy appointment at infusion tomorrow 4/21. Per patient labs show need for phlebotomy. Patient would like to use lab results from todays appointment for visit tomorrow, states he does not like to be \"stuck\" so many times. Will bring with him to appointment tomorrow. Forwarded to provider for review.

## 2017-04-20 NOTE — TELEPHONE ENCOUNTER
Fine to use labs that were drawn yesterday but they need to scanned into system and reviewed prior to phlebotomy.

## 2017-04-21 ENCOUNTER — HOSPITAL ENCOUNTER (OUTPATIENT)
Dept: INFUSION THERAPY | Age: 62
Discharge: HOME | End: 2017-04-21
Payer: COMMERCIAL

## 2017-04-21 VITALS
SYSTOLIC BLOOD PRESSURE: 134 MMHG | TEMPERATURE: 96.8 F | DIASTOLIC BLOOD PRESSURE: 78 MMHG | HEART RATE: 67 BPM | RESPIRATION RATE: 18 BRPM

## 2017-04-21 PROCEDURE — 99195 PHLEBOTOMY: CPT

## 2017-04-21 NOTE — PROGRESS NOTES
Patient had labs drawn on 4/20/17 HCT 47.3, copy of labs scanned. Orders are to maintain Hct less than 45. Therapeutic phlebotomy performed in left antecubital with # 16 gauge needle and single blood pack. 500 ml. of blood obtained. Needle removed. 2 x 2s and pressure applied, and arm elevated. 2 x2s secured with Coban. Pt. tolerated procedure well. Patient offered a drink and snack. For observation for 30 minutes. Blood pressure 134/78, pulse 67, temperature 96.8 °F (36 °C), resp. rate 18.

## 2017-05-19 ENCOUNTER — TELEPHONE (OUTPATIENT)
Dept: ONCOLOGY | Age: 62
End: 2017-05-19

## 2017-05-19 ENCOUNTER — HOSPITAL ENCOUNTER (OUTPATIENT)
Dept: INFUSION THERAPY | Age: 62
Discharge: HOME | End: 2017-05-19
Payer: COMMERCIAL

## 2017-05-19 VITALS
RESPIRATION RATE: 18 BRPM | DIASTOLIC BLOOD PRESSURE: 77 MMHG | SYSTOLIC BLOOD PRESSURE: 122 MMHG | TEMPERATURE: 97 F | HEART RATE: 70 BPM | OXYGEN SATURATION: 96 %

## 2017-05-19 LAB
BASOPHILS # BLD AUTO: 0.1 K/UL (ref 0–0.1)
BASOPHILS # BLD: 1 % (ref 0–1)
DIFFERENTIAL METHOD BLD: ABNORMAL
EOSINOPHIL # BLD: 0.3 K/UL (ref 0–0.4)
EOSINOPHIL NFR BLD: 3 % (ref 0–7)
ERYTHROCYTE [DISTWIDTH] IN BLOOD BY AUTOMATED COUNT: 17.1 % (ref 11.5–14.5)
HCT VFR BLD AUTO: 42.6 % (ref 36.6–50.3)
HGB BLD-MCNC: 13.3 G/DL (ref 12.1–17)
LYMPHOCYTES # BLD AUTO: 34 % (ref 12–49)
LYMPHOCYTES # BLD: 3.3 K/UL (ref 0.8–3.5)
MCH RBC QN AUTO: 21.6 PG (ref 26–34)
MCHC RBC AUTO-ENTMCNC: 31.2 G/DL (ref 30–36.5)
MCV RBC AUTO: 69.2 FL (ref 80–99)
MONOCYTES # BLD: 0.6 K/UL (ref 0–1)
MONOCYTES NFR BLD AUTO: 6 % (ref 5–13)
NEUTS SEG # BLD: 5.3 K/UL (ref 1.8–8)
NEUTS SEG NFR BLD AUTO: 56 % (ref 32–75)
PLATELET # BLD AUTO: 591 K/UL (ref 150–400)
RBC # BLD AUTO: 6.16 M/UL (ref 4.1–5.7)
RBC MORPH BLD: ABNORMAL
WBC # BLD AUTO: 9.6 K/UL (ref 4.1–11.1)

## 2017-05-19 PROCEDURE — 85025 COMPLETE CBC W/AUTO DIFF WBC: CPT | Performed by: NURSE PRACTITIONER

## 2017-05-19 PROCEDURE — 36415 COLL VENOUS BLD VENIPUNCTURE: CPT | Performed by: NURSE PRACTITIONER

## 2017-05-19 NOTE — TELEPHONE ENCOUNTER
Patient needs standing order for CBC faxed to [facility].     Please fax to [phone]

## 2017-05-19 NOTE — PROGRESS NOTES
1520: Pt admit to [facility] for Therapeutic Phlebotomy ambulatory in stable condition. Assessment completed. No new concerns voiced. Labs drawn per order and sent for processing. Labs reviewed no phlebotomy required. Visit Vitals    /77 (BP 1 Location: Left arm, BP Patient Position: Sitting)    Pulse 70    Temp 97 °F (36.1 °C)    Resp 18    SpO2 96%     Recent Results (from the past 12 hour(s))   CBC WITH AUTOMATED DIFF    Collection Time: 05/19/17  3:28 PM   Result Value Ref Range    WBC 9.6 4.1 - 11.1 K/uL    RBC 6.16 (H) 4.10 - 5.70 M/uL    HGB 13.3 12.1 - 17.0 g/dL    HCT 42.6 36.6 - 50.3 %    MCV 69.2 (L) 80.0 - 99.0 FL    MCH 21.6 (L) 26.0 - 34.0 PG    MCHC 31.2 30.0 - 36.5 g/dL    RDW 17.1 (H) 11.5 - 14.5 %    PLATELET 034 (H) 784 - 400 K/uL    NEUTROPHILS PENDING %    LYMPHOCYTES PENDING %    MONOCYTES PENDING %    EOSINOPHILS PENDING %    BASOPHILS PENDING %    ABS. NEUTROPHILS PENDING K/UL    ABS. LYMPHOCYTES PENDING K/UL    ABS. MONOCYTES PENDING K/UL    ABS. EOSINOPHILS PENDING K/UL    ABS. BASOPHILS PENDING K/UL    DF PENDING      1600: Pt D/Cd from [facility] ambulatory and in no distress. Next appt 6/16/17.

## 2017-05-22 NOTE — TELEPHONE ENCOUNTER
Has current standing order for CBC dated 3/3/17 which is good for 6 months. Current order re-faxed to number below/confirmation.

## 2017-06-15 LAB
BASOPHILS # BLD AUTO: 0.1 X10E3/UL (ref 0–0.2)
BASOPHILS NFR BLD AUTO: 1 %
EOSINOPHIL # BLD AUTO: 0.2 X10E3/UL (ref 0–0.4)
EOSINOPHIL NFR BLD AUTO: 2 %
ERYTHROCYTE [DISTWIDTH] IN BLOOD BY AUTOMATED COUNT: 17.6 % (ref 12.3–15.4)
HCT VFR BLD AUTO: 45.6 % (ref 37.5–51)
HGB BLD-MCNC: 13.8 G/DL (ref 12.6–17.7)
IMM GRANULOCYTES # BLD: 0 X10E3/UL (ref 0–0.1)
IMM GRANULOCYTES NFR BLD: 0 %
LYMPHOCYTES # BLD AUTO: 2.1 X10E3/UL (ref 0.7–3.1)
LYMPHOCYTES NFR BLD AUTO: 28 %
MCH RBC QN AUTO: 20.9 PG (ref 26.6–33)
MCHC RBC AUTO-ENTMCNC: 30.3 G/DL (ref 31.5–35.7)
MCV RBC AUTO: 69 FL (ref 79–97)
MONOCYTES # BLD AUTO: 0.4 X10E3/UL (ref 0.1–0.9)
MONOCYTES NFR BLD AUTO: 5 %
NEUTROPHILS # BLD AUTO: 4.9 X10E3/UL (ref 1.4–7)
NEUTROPHILS NFR BLD AUTO: 64 %
PLATELET # BLD AUTO: 597 X10E3/UL (ref 150–379)
RBC # BLD AUTO: 6.6 X10E6/UL (ref 4.14–5.8)
WBC # BLD AUTO: 7.6 X10E3/UL (ref 3.4–10.8)

## 2017-06-16 ENCOUNTER — HOSPITAL ENCOUNTER (OUTPATIENT)
Dept: INFUSION THERAPY | Age: 62
Discharge: HOME | End: 2017-06-16
Payer: COMMERCIAL

## 2017-06-16 VITALS
HEART RATE: 89 BPM | TEMPERATURE: 98.1 F | OXYGEN SATURATION: 96 % | RESPIRATION RATE: 18 BRPM | DIASTOLIC BLOOD PRESSURE: 78 MMHG | SYSTOLIC BLOOD PRESSURE: 127 MMHG

## 2017-06-16 PROCEDURE — 99195 PHLEBOTOMY: CPT

## 2017-06-16 NOTE — PROGRESS NOTES
Outpatient Infusion Center Progress Note    1600 Pt admit to [facility] for Therapeutic Phlebotomy ambulatory in stable condition. Assessment completed. No new concerns voiced. Labs drawn on 6-14 show hct of 45.6. Phlebotomy preformed via right posterior forearm, 500 cc of blood drained without difficulty. Needle removed, pressure applied for 10 min and wrapped with coban, VSS. Visit Vitals    /78    Pulse 89    Temp 98.1 °F (36.7 °C)    Resp 18    SpO2 96%       1700 Pt tolerated treatment well. D/c home ambulatory in no distress. Pt aware of next appointment scheduled for 7/14/17.

## 2017-06-19 RX ORDER — LISINOPRIL 5 MG/1
TABLET ORAL
Qty: 360 TAB | Refills: 0 | Status: SHIPPED | OUTPATIENT
Start: 2017-06-19 | End: 2017-10-17

## 2017-06-26 ENCOUNTER — TELEPHONE (OUTPATIENT)
Dept: FAMILY MEDICINE CLINIC | Age: 62
End: 2017-06-26

## 2017-06-26 DIAGNOSIS — D45 CHRONIC ERYTHREMIA IN REMISSION (HCC): Primary | ICD-10-CM

## 2017-07-14 ENCOUNTER — HOSPITAL ENCOUNTER (OUTPATIENT)
Dept: INFUSION THERAPY | Age: 62
Discharge: HOME | End: 2017-07-14
Payer: COMMERCIAL

## 2017-07-14 VITALS
TEMPERATURE: 98.5 F | RESPIRATION RATE: 18 BRPM | SYSTOLIC BLOOD PRESSURE: 135 MMHG | HEART RATE: 61 BPM | DIASTOLIC BLOOD PRESSURE: 86 MMHG

## 2017-07-14 LAB
BASOPHILS # BLD AUTO: 0.1 K/UL (ref 0–0.1)
BASOPHILS # BLD: 1 % (ref 0–1)
DIFFERENTIAL METHOD BLD: ABNORMAL
EOSINOPHIL # BLD: 0.2 K/UL (ref 0–0.4)
EOSINOPHIL NFR BLD: 3 % (ref 0–7)
ERYTHROCYTE [DISTWIDTH] IN BLOOD BY AUTOMATED COUNT: 18 % (ref 11.5–14.5)
HCT VFR BLD AUTO: 42.9 % (ref 36.6–50.3)
HGB BLD-MCNC: 13.3 G/DL (ref 12.1–17)
LYMPHOCYTES # BLD AUTO: 32 % (ref 12–49)
LYMPHOCYTES # BLD: 2.5 K/UL (ref 0.8–3.5)
MCH RBC QN AUTO: 21.2 PG (ref 26–34)
MCHC RBC AUTO-ENTMCNC: 31 G/DL (ref 30–36.5)
MCV RBC AUTO: 68.3 FL (ref 80–99)
MONOCYTES # BLD: 0.6 K/UL (ref 0–1)
MONOCYTES NFR BLD AUTO: 8 % (ref 5–13)
NEUTS SEG # BLD: 4.5 K/UL (ref 1.8–8)
NEUTS SEG NFR BLD AUTO: 56 % (ref 32–75)
PLATELET # BLD AUTO: 546 K/UL (ref 150–400)
RBC # BLD AUTO: 6.28 M/UL (ref 4.1–5.7)
RBC MORPH BLD: ABNORMAL
WBC # BLD AUTO: 7.9 K/UL (ref 4.1–11.1)
WBC MORPH BLD: ABNORMAL

## 2017-07-14 PROCEDURE — 36415 COLL VENOUS BLD VENIPUNCTURE: CPT | Performed by: INTERNAL MEDICINE

## 2017-07-14 PROCEDURE — 85025 COMPLETE CBC W/AUTO DIFF WBC: CPT | Performed by: INTERNAL MEDICINE

## 2017-07-14 NOTE — PROGRESS NOTES
Outpatient Infusion Center Short Visit Progress Note    1600 Pt admit to [facility] for Therapeutic Phlebotomy ambulatory in stable condition. Assessment completed. No new concerns voiced. CBC drawn and sent for processing. Labs reviewed. Visit Vitals    /86 (BP 1 Location: Left arm, BP Patient Position: Sitting)    Pulse 61    Temp 98.5 °F (36.9 °C)    Resp 18     NO THERAPEUTIC PHLEBOTOMY NEEDED      1640 Pt tolerated treatment well. D/c home ambulatory in no distress. Pt aware of next appointment scheduled for 8/11/17    Recent Results (from the past 12 hour(s))   CBC WITH AUTOMATED DIFF    Collection Time: 07/14/17  4:02 PM   Result Value Ref Range    WBC 7.9 4.1 - 11.1 K/uL    RBC 6.28 (H) 4.10 - 5.70 M/uL    HGB 13.3 12.1 - 17.0 g/dL    HCT 42.9 36.6 - 50.3 %    MCV 68.3 (L) 80.0 - 99.0 FL    MCH 21.2 (L) 26.0 - 34.0 PG    MCHC 31.0 30.0 - 36.5 g/dL    RDW 18.0 (H) 11.5 - 14.5 %    PLATELET 725 (H) 778 - 400 K/uL    NEUTROPHILS 56 32 - 75 %    LYMPHOCYTES 32 12 - 49 %    MONOCYTES 8 5 - 13 %    EOSINOPHILS 3 0 - 7 %    BASOPHILS 1 0 - 1 %    ABS. NEUTROPHILS 4.5 1.8 - 8.0 K/UL    ABS. LYMPHOCYTES 2.5 0.8 - 3.5 K/UL    ABS. MONOCYTES 0.6 0.0 - 1.0 K/UL    ABS. EOSINOPHILS 0.2 0.0 - 0.4 K/UL    ABS.  BASOPHILS 0.1 0.0 - 0.1 K/UL    DF SMEAR SCANNED      RBC COMMENTS ANISOCYTOSIS  1+        RBC COMMENTS MICROCYTOSIS  2+        RBC COMMENTS HYPOCHROMIA  2+        RBC COMMENTS POLYCHROMASIA  PRESENT        WBC COMMENTS REACTIVE LYMPHS

## 2017-08-06 LAB
BASOPHILS # BLD AUTO: 0.1 X10E3/UL (ref 0–0.2)
BASOPHILS NFR BLD AUTO: 1 %
EOSINOPHIL # BLD AUTO: 0.3 X10E3/UL (ref 0–0.4)
EOSINOPHIL NFR BLD AUTO: 3 %
ERYTHROCYTE [DISTWIDTH] IN BLOOD BY AUTOMATED COUNT: 17.8 % (ref 12.3–15.4)
HCT VFR BLD AUTO: 40.8 % (ref 37.5–51)
HGB BLD-MCNC: 12.6 G/DL (ref 12.6–17.7)
IMM GRANULOCYTES # BLD: 0.1 X10E3/UL (ref 0–0.1)
IMM GRANULOCYTES NFR BLD: 1 %
LYMPHOCYTES # BLD AUTO: 4.1 X10E3/UL (ref 0.7–3.1)
LYMPHOCYTES NFR BLD AUTO: 38 %
MCH RBC QN AUTO: 20.6 PG (ref 26.6–33)
MCHC RBC AUTO-ENTMCNC: 30.9 G/DL (ref 31.5–35.7)
MCV RBC AUTO: 67 FL (ref 79–97)
MONOCYTES # BLD AUTO: 0.9 X10E3/UL (ref 0.1–0.9)
MONOCYTES NFR BLD AUTO: 8 %
NEUTROPHILS # BLD AUTO: 5.4 X10E3/UL (ref 1.4–7)
NEUTROPHILS NFR BLD AUTO: 49 %
PLATELET # BLD AUTO: 556 X10E3/UL (ref 150–379)
RBC # BLD AUTO: 6.13 X10E6/UL (ref 4.14–5.8)
WBC # BLD AUTO: 10.9 X10E3/UL (ref 3.4–10.8)

## 2017-08-09 ENCOUNTER — TELEPHONE (OUTPATIENT)
Dept: ONCOLOGY | Age: 62
End: 2017-08-09

## 2017-08-09 NOTE — TELEPHONE ENCOUNTER
Shelby Correia (christina) called in requesting for standing lab order be  sent to [address] [place] [address] on [address] [place] [address]. Fax [phone], Pt stated he gets his labs drawn every two weeks.  Please give christina c/b @ [phone]

## 2017-08-09 NOTE — TELEPHONE ENCOUNTER
Returned call to patient. HIPAA verified. States current standing order for labs is due to  the beginning of Sept.  Wanted to make sure renewal is sent in before expiration date. Advised will forward to provider.   Current order faxed complete to [facility] per patient request.

## 2017-08-11 ENCOUNTER — HOSPITAL ENCOUNTER (OUTPATIENT)
Dept: INFUSION THERAPY | Age: 62
End: 2017-08-11

## 2017-08-11 NOTE — TELEPHONE ENCOUNTER
HIPAA verified. Adivsed that standing labs faxed complete to [facility] @ [address] [facility]. Verbalized understanding and thanked for call.

## 2017-08-22 ENCOUNTER — OFFICE VISIT (OUTPATIENT)
Dept: FAMILY MEDICINE CLINIC | Age: 62
End: 2017-08-22

## 2017-08-22 ENCOUNTER — TELEPHONE (OUTPATIENT)
Dept: ONCOLOGY | Age: 62
End: 2017-08-22

## 2017-08-22 VITALS
DIASTOLIC BLOOD PRESSURE: 74 MMHG | WEIGHT: 203 LBS | OXYGEN SATURATION: 98 % | RESPIRATION RATE: 18 BRPM | HEIGHT: 70 IN | HEART RATE: 86 BPM | TEMPERATURE: 98.2 F | SYSTOLIC BLOOD PRESSURE: 138 MMHG | BODY MASS INDEX: 29.06 KG/M2

## 2017-08-22 DIAGNOSIS — I10 ESSENTIAL HYPERTENSION, BENIGN: ICD-10-CM

## 2017-08-22 DIAGNOSIS — Z00.00 ROUTINE GENERAL MEDICAL EXAMINATION AT A HEALTH CARE FACILITY: Primary | ICD-10-CM

## 2017-08-22 DIAGNOSIS — R39.15 URGENCY OF URINATION: ICD-10-CM

## 2017-08-22 DIAGNOSIS — D45 POLYCYTHEMIA VERA (HCC): Chronic | ICD-10-CM

## 2017-08-22 DIAGNOSIS — N40.0 BENIGN PROSTATIC HYPERPLASIA, PRESENCE OF LOWER URINARY TRACT SYMPTOMS UNSPECIFIED, UNSPECIFIED MORPHOLOGY: ICD-10-CM

## 2017-08-22 DIAGNOSIS — E78.2 MIXED HYPERLIPIDEMIA: ICD-10-CM

## 2017-08-22 LAB
BILIRUB UR QL STRIP: NEGATIVE
GLUCOSE UR-MCNC: NEGATIVE MG/DL
KETONES P FAST UR STRIP-MCNC: NEGATIVE MG/DL
PH UR STRIP: 5.5 [PH] (ref 4.6–8)
PROT UR QL STRIP: NEGATIVE MG/DL
SP GR UR STRIP: 1.03 (ref 1–1.03)
UA UROBILINOGEN AMB POC: NORMAL (ref 0.2–1)
URINALYSIS CLARITY POC: CLEAR
URINALYSIS COLOR POC: YELLOW
URINE BLOOD POC: NEGATIVE
URINE LEUKOCYTES POC: NEGATIVE
URINE NITRITES POC: NEGATIVE

## 2017-08-22 RX ORDER — OMEPRAZOLE 20 MG/1
20 CAPSULE, DELAYED RELEASE ORAL
COMMUNITY
End: 2017-10-04

## 2017-08-22 RX ORDER — CIPROFLOXACIN AND DEXAMETHASONE 3; 1 MG/ML; MG/ML
4 SUSPENSION/ DROPS AURICULAR (OTIC) 2 TIMES DAILY
COMMUNITY
End: 2018-05-02 | Stop reason: ALTCHOICE

## 2017-08-22 NOTE — PROGRESS NOTES
Here for cpx     Chief Complaint   Patient presents with    Other     health screening form    Finger Swelling     right pinky finger swollen,. pain     Urinary Frequency     urinary urgency/frequency, sensitive when urinating    Medication Problem     needs to be on asa for blood disease and prilosec but stomach keeps spasming    Ear Fullness     ears feel full. Allergies?  uses ear drops occasionally. he is a 63y.o. year old male who presents for evalution. Reviewed PmHx, RxHx, FmHx, SocHx, AllgHx and updated and dated in the chart. Patient Active Problem List    Diagnosis    Benign prostatic hyperplasia    Chronic nonintractable headache    Anxiety and depression    Vertigo    Gallstones    Ringing in ears    Fatigue    Polycythemia vera ([address]    Mixed hyperlipidemia    LAINE (obstructive sleep apnea)    Essential hypertension, benign    Depression       Review of Systems - negative except as listed above in the HPI    Objective:     Vitals:    08/22/17 1115   BP: 140/76   Pulse: 86   Resp: 18   Temp: 98.2 °F (36.8 °C)   SpO2: 98%   Weight: 203 lb (92.1 kg)   Height: 5' 10\" (1.778 m)     Physical Examination: General appearance - alert, well appearing, and in no distress  Neck - supple, no significant adenopathy  Chest - clear to auscultation, no wheezes, rales or rhonchi, symmetric air entry  Heart - normal rate, regular rhythm, normal S1, S2, no murmurs, rubs, clicks or gallops  Abdomen - soft, nontender, nondistended, no masses or organomegaly    Assessment/ Plan:   Diagnoses and all orders for this visit:    1. Routine general medical examination at a health care facility  -     LIPID PANEL  -     METABOLIC PANEL, COMPREHENSIVE  -     CBC WITH AUTOMATED DIFF  -     TSH 3RD GENERATION  -     PROSTATE SPECIFIC AG    2. Urgency of urination  -     AMB POC URINALYSIS DIP STICK AUTO W/O MICRO  -neg ua  -refer to urology due to bph issues    3.  Polycythemia vera (HCC)  -     CBC WITH AUTOMATED DIFF    4. Essential hypertension, benign  -     LIPID PANEL  -     METABOLIC PANEL, COMPREHENSIVE    5. Mixed hyperlipidemia  -labs    6. Benign prostatic hyperplasia, presence of lower urinary tract symptoms unspecified, unspecified morphology  -as above       -Patient is in good health  -Discussed with patient cancer risk factors and screens needed  -Patient needs a colonoscopy no  -Labs from previous visits were discussed with patient yes  -Discussed with patient diet and exercise=yes  -Discussed with patient testicular (male)/breast self exam (female)= yes  Follow-up Disposition:  Return if symptoms worsen or fail to improve. I have discussed the diagnosis with the patient and the intended plan as seen in the above orders. The patient understands and agrees with the plan. The patient has received an after-visit summary and questions were answered concerning future plans. Medication Side Effects and Warnings were discussed with patient  Patient Labs were reviewed and or requested  Patient Past Records were reviewed and or requested     There are no Patient Instructions on file for this visit.         Jie Escobar M.D.

## 2017-08-22 NOTE — TELEPHONE ENCOUNTER
Tara Baker (christina) called in stated he was seen by Dr. Marcio Ibarra today for pinky finger. Pt stated his finger has been bruising at the tip and poor circulation for last month or so.  Please give pt c/b @ [phone]

## 2017-08-22 NOTE — MR AVS SNAPSHOT
Visit Information Date & Time Provider Department Dept. Phone Encounter #  
 8/22/2017 10:45 AM Moses Bowens MD [address] [phone] [id number] Follow-up Instructions Return if symptoms worsen or fail to improve. Your Appointments 9/7/2017  2:00 PM  
Follow Up with Jaleel Urias  [facility] Oncology at [facility] Appt Note: Thrombocytopenia, 6 month f/u, CP 0  
 [address] [address]  
[phone]  
  
   
 [address] [address] Upcoming Health Maintenance Date Due Hepatitis C Screening 1955 DTaP/Tdap/Td series (1 - Tdap) 7/24/2004 Pneumococcal 19-64 Highest Risk (3 of 3 - PCV13) 1/12/2017 ZOSTER VACCINE AGE 60> 4/23/2017 INFLUENZA AGE 9 TO ADULT 8/1/2017 COLONOSCOPY 1/19/2025 Allergies as of 8/22/2017  Review Complete On: 8/22/2017 By: Moses Bowens MD  
  
 Severity Noted Reaction Type Reactions Sulfur High 06/15/2011   Systemic Anaphylaxis, Hives, Seizures Zyprexa [Olanzapine] High 07/23/2013    Anaphylaxis Celexa [Citalopram]  07/23/2013    Other (comments) groggy Clindamycin  07/23/2013    Nausea and Vomiting Lisinopril  02/23/2015    Other (comments) Prozac [Fluoxetine]  07/23/2013    Anxiety Risperidone  07/23/2013    Anxiety Current Immunizations  Reviewed on 7/14/2017 Name Date Influenza High Dose Vaccine PF 11/12/2015 Influenza Vaccine 10/23/2012 Influenza Vaccine Alyne Lyriclly) 10/13/2016 Influenza Vaccine PF 9/30/2013 Pneumococcal Polysaccharide (PPSV-23) 1/12/2016 Pneumococcal Vaccine (Unspecified Type) 7/23/2012 Td 7/23/2004 Not reviewed this visit You Were Diagnosed With   
  
 Codes Comments Routine general medical examination at a health care facility    -  Primary ICD-10-CM: Z00.00 ICD-9-CM: V70.0 Urgency of urination     ICD-10-CM: R39.15 ICD-9-CM: 718.45 Polycythemia vera ([facility]     ICD-10-CM: X79 ICD-9-CM: 238.4 Essential hypertension, benign     ICD-10-CM: I10 
ICD-9-CM: 401.1 Mixed hyperlipidemia     ICD-10-CM: E78.2 ICD-9-CM: 272.2 Benign prostatic hyperplasia, presence of lower urinary tract symptoms unspecified, unspecified morphology     ICD-10-CM: N40.0 ICD-9-CM: 600.00 Vitals BP Pulse Temp Resp Height(growth percentile) Weight(growth percentile) 140/76 86 98.2 °F (36.8 °C) 18 5' 10\" (1.778 m) 203 lb (92.1 kg) SpO2 BMI Smoking Status 98% 29.13 kg/m2 Current Every Day Smoker Vitals History BMI and BSA Data Body Mass Index Body Surface Area  
 29.13 kg/m 2 2.13 m 2 Preferred Pharmacy Pharmacy Name Phone [facility] [address] [place] [address] [phone] Your Updated Medication List  
  
   
This list is accurate as of: 8/22/17 11:55 AM.  Always use your most recent med list.  
  
  
  
  
 aspirin delayed-release 81 mg tablet Take 325 mg by mouth daily. Calcium-Mag-Vit B6-D3-Minerals 272-47-4-125 xp-if-ei-unit Tab Take  by mouth daily. CENTRUM SILVER ULTRA MEN'S PO Take  by mouth. ciprofloxacin-dexamethasone 0.3-0.1 % otic suspension Commonly known as:  Kojo Neal Administer 4 Drops in left ear two (2) times a day. cyanocobalamin 1,000 mcg tablet Take 1,000 mcg by mouth daily. lisinopril 5 mg tablet Commonly known as:  PRINIVIL, ZESTRIL  
TAKE 1 TO 4 TABLETS BY MOUTH DAILY FOR BLOOD PRESSURE READINGS AS DIRECTED  
  
 omeprazole 20 mg capsule Commonly known as:  PRILOSEC Take 20 mg by mouth Daily (before breakfast). tamsulosin 0.4 mg capsule Commonly known as:  FLOMAX Take 1 Cap by mouth daily. traMADol 50 mg tablet Commonly known as:  ULTRAM  
TAKE 1 TABLET BY MOUTH EVERY 6 HOURS AS NEEDED FOR PAIN. MAX 200MG IN 1 DAY.   
  
 VITAMIN D3 2,000 unit Tab Generic drug:  cholecalciferol (vitamin D3) Take  by mouth daily. We Performed the Following AMB POC URINALYSIS DIP STICK AUTO W/O MICRO [65874 CPT(R)] CBC WITH AUTOMATED DIFF [55511 CPT(R)] LIPID PANEL [54169 CPT(R)] METABOLIC PANEL, COMPREHENSIVE [57667 CPT(R)] PSA, DIAGNOSTIC (PROSTATE SPECIFIC AG) [id number] CPT(R)] TSH 3RD GENERATION [26807 CPT(R)] Follow-up Instructions Return if symptoms worsen or fail to improve. To-Do List   
 09/08/2017 4:00 PM  
  Appointment with [address] [facility] ([phone]) 10/06/2017 4:00 PM  
  Appointment with [address] [facility] ([phone])  
  
 11/03/2017 4:00 PM  
  Appointment with [address] [facility] [address] [facility] ([phone]) 12/01/2017 4:00 PM  
  Appointment with [address] [facility] ([phone])  
  
 12/29/2017 4:00 PM  
  Appointment with [address] [facility] ([phone])  
  
 01/26/2018 4:00 PM  
  Appointment with [address] [facility] [address] [facility] ([phone])  
  
 02/23/2018 4:00 PM  
  Appointment with [address] [facility] ([phone])  
  
 03/23/2018 4:00 PM  
  Appointment with [address] [facility] ([phone])  
  
 04/20/2018 4:00 PM  
  Appointment with [address] [facility] [address] [facility] ([phone])  
  
 05/18/2018 4:00 PM  
  Appointment with [address] [facility] ([phone])  
  
 06/15/2018 4:00 PM  
  Appointment with [address] [facility] ([phone]) Introducing [facility] SERVICES! Dear Tahira Vasquez: Thank you for requesting a MyChart account. Our records indicate that you already have an active [employer] account. You can access your account anytime at [email or web address] Did you know that you can access your hospital and ER discharge instructions at any time in [employer]? You can also review all of your test results from your hospital stay or ER visit. Additional Information If you have questions, please visit the Frequently Asked Questions section of the [employer] website at [email or web address] Remember, [employer] is NOT to be used for urgent needs. For medical emergencies, dial 911. Now available from your iPhone and Android! Please provide this summary of care documentation to your next provider. Your primary care clinician is listed as TIMUR LANDAVERDE. If you have any questions after today's visit, please call [phone].

## 2017-08-22 NOTE — PROGRESS NOTES
Results for orders placed or performed in visit on 08/22/17   AMB POC URINALYSIS DIP STICK AUTO W/O MICRO   Result Value Ref Range    Color (UA POC) Yellow     Clarity (UA POC) Clear     Glucose (UA POC) Negative Negative    Bilirubin (UA POC) Negative Negative    Ketones (UA POC) Negative Negative    Specific gravity (UA POC) 1.030 1.001 - 1.035    Blood (UA POC) Negative Negative    pH (UA POC) 5.5 4.6 - 8.0    Protein (UA POC) Negative Negative mg/dL    Urobilinogen (UA POC) 0.2 mg/dL 0.2 - 1    Nitrites (UA POC) Negative Negative    Leukocyte esterase (UA POC) Negative Negative

## 2017-08-23 LAB
ALBUMIN SERPL-MCNC: 4.4 G/DL (ref 3.6–4.8)
ALBUMIN/GLOB SERPL: 1.8 {RATIO} (ref 1.2–2.2)
ALP SERPL-CCNC: 74 IU/L (ref 39–117)
ALT SERPL-CCNC: 21 IU/L (ref 0–44)
AST SERPL-CCNC: 17 IU/L (ref 0–40)
BASOPHILS # BLD AUTO: 0.1 X10E3/UL (ref 0–0.2)
BASOPHILS NFR BLD AUTO: 1 %
BILIRUB SERPL-MCNC: 0.4 MG/DL (ref 0–1.2)
BUN SERPL-MCNC: 13 MG/DL (ref 8–27)
BUN/CREAT SERPL: 13 (ref 10–24)
CALCIUM SERPL-MCNC: 9.2 MG/DL (ref 8.6–10.2)
CHLORIDE SERPL-SCNC: 102 MMOL/L (ref 96–106)
CHOLEST SERPL-MCNC: 124 MG/DL (ref 100–199)
CO2 SERPL-SCNC: 21 MMOL/L (ref 18–29)
CREAT SERPL-MCNC: 1 MG/DL (ref 0.76–1.27)
EOSINOPHIL # BLD AUTO: 0.2 X10E3/UL (ref 0–0.4)
EOSINOPHIL NFR BLD AUTO: 3 %
ERYTHROCYTE [DISTWIDTH] IN BLOOD BY AUTOMATED COUNT: 18.3 % (ref 12.3–15.4)
GLOBULIN SER CALC-MCNC: 2.5 G/DL (ref 1.5–4.5)
GLUCOSE SERPL-MCNC: 106 MG/DL (ref 65–99)
HCT VFR BLD AUTO: 44.4 % (ref 37.5–51)
HDLC SERPL-MCNC: 33 MG/DL
HGB BLD-MCNC: 13.3 G/DL (ref 12.6–17.7)
IMM GRANULOCYTES # BLD: 0 X10E3/UL (ref 0–0.1)
IMM GRANULOCYTES NFR BLD: 0 %
INTERPRETATION, 910389: NORMAL
LDLC SERPL CALC-MCNC: 72 MG/DL (ref 0–99)
LYMPHOCYTES # BLD AUTO: 2.3 X10E3/UL (ref 0.7–3.1)
LYMPHOCYTES NFR BLD AUTO: 28 %
MCH RBC QN AUTO: 20.8 PG (ref 26.6–33)
MCHC RBC AUTO-ENTMCNC: 30 G/DL (ref 31.5–35.7)
MCV RBC AUTO: 69 FL (ref 79–97)
MONOCYTES # BLD AUTO: 0.5 X10E3/UL (ref 0.1–0.9)
MONOCYTES NFR BLD AUTO: 7 %
NEUTROPHILS # BLD AUTO: 5 X10E3/UL (ref 1.4–7)
NEUTROPHILS NFR BLD AUTO: 61 %
PLATELET # BLD AUTO: 530 X10E3/UL (ref 150–379)
POTASSIUM SERPL-SCNC: 4.7 MMOL/L (ref 3.5–5.2)
PROT SERPL-MCNC: 6.9 G/DL (ref 6–8.5)
PSA SERPL-MCNC: 1.2 NG/ML (ref 0–4)
RBC # BLD AUTO: 6.4 X10E6/UL (ref 4.14–5.8)
SODIUM SERPL-SCNC: 141 MMOL/L (ref 134–144)
TRIGL SERPL-MCNC: 94 MG/DL (ref 0–149)
TSH SERPL DL<=0.005 MIU/L-ACNC: 1.39 UIU/ML (ref 0.45–4.5)
VLDLC SERPL CALC-MCNC: 19 MG/DL (ref 5–40)
WBC # BLD AUTO: 8.2 X10E3/UL (ref 3.4–10.8)

## 2017-08-23 NOTE — TELEPHONE ENCOUNTER
Patient called returning Natalie's call. Patient states he remembers recently smashed his finger and that \"he may have overreacted with his first message\" and that \"he is okay\". Pt is not requesting a call back.

## 2017-08-23 NOTE — TELEPHONE ENCOUNTER
Called patient. He reports he just called again a couple hours ago and left a voicemail stating he had \"smashed his finger a couple months ago\" and that \"it's not as big of a deal as I thought it was. \" He reports his finger has been blue/painful on and off for the past month. He reports that a couple days ago he felt like his arm was warm with some pains. He denies chest pain or any new or unusual headaches. Patient is concerned because his PCP stated it was related to his polycythemia vera. He was told by Dr. Tanesha Ng that it looked like a broken vessel in his finger. Encouraged him to follow-up with PCP if symptoms worsen or continue. He reports he spoke with Dr. Tanesha Ng yesterday. He denies additional questions or concerns at this time.

## 2017-08-23 NOTE — TELEPHONE ENCOUNTER
Message left that voicemail recd and forwarded to provider. Advised our office would call if any recommendations.

## 2017-08-29 ENCOUNTER — TELEPHONE (OUTPATIENT)
Dept: FAMILY MEDICINE CLINIC | Age: 62
End: 2017-08-29

## 2017-08-29 DIAGNOSIS — N40.2 PROSTATE NODULE: Primary | ICD-10-CM

## 2017-09-05 ENCOUNTER — TELEPHONE (OUTPATIENT)
Dept: ONCOLOGY | Age: 62
End: 2017-09-05

## 2017-09-05 NOTE — TELEPHONE ENCOUNTER
HIPAA verified. [address] did not receive standing orders. Advised would re-fax-done complete to [phone].

## 2017-09-06 LAB
BASOPHILS # BLD AUTO: 0.1 X10E3/UL (ref 0–0.2)
BASOPHILS NFR BLD AUTO: 1 %
EOSINOPHIL # BLD AUTO: 0.2 X10E3/UL (ref 0–0.4)
EOSINOPHIL NFR BLD AUTO: 2 %
ERYTHROCYTE [DISTWIDTH] IN BLOOD BY AUTOMATED COUNT: 18.5 % (ref 12.3–15.4)
HCT VFR BLD AUTO: 44.2 % (ref 37.5–51)
HGB BLD-MCNC: 13.4 G/DL (ref 12.6–17.7)
IMM GRANULOCYTES # BLD: 0 X10E3/UL (ref 0–0.1)
IMM GRANULOCYTES NFR BLD: 0 %
LYMPHOCYTES # BLD AUTO: 1.8 X10E3/UL (ref 0.7–3.1)
LYMPHOCYTES NFR BLD AUTO: 26 %
MCH RBC QN AUTO: 20.9 PG (ref 26.6–33)
MCHC RBC AUTO-ENTMCNC: 30.3 G/DL (ref 31.5–35.7)
MCV RBC AUTO: 69 FL (ref 79–97)
MONOCYTES # BLD AUTO: 0.4 X10E3/UL (ref 0.1–0.9)
MONOCYTES NFR BLD AUTO: 5 %
NEUTROPHILS # BLD AUTO: 4.6 X10E3/UL (ref 1.4–7)
NEUTROPHILS NFR BLD AUTO: 66 %
PLATELET # BLD AUTO: 535 X10E3/UL (ref 150–379)
RBC # BLD AUTO: 6.41 X10E6/UL (ref 4.14–5.8)
WBC # BLD AUTO: 7 X10E3/UL (ref 3.4–10.8)

## 2017-09-07 ENCOUNTER — OFFICE VISIT (OUTPATIENT)
Dept: ONCOLOGY | Age: 62
End: 2017-09-07

## 2017-09-07 VITALS
DIASTOLIC BLOOD PRESSURE: 87 MMHG | BODY MASS INDEX: 28.52 KG/M2 | HEART RATE: 68 BPM | TEMPERATURE: 97.6 F | HEIGHT: 70 IN | SYSTOLIC BLOOD PRESSURE: 144 MMHG | RESPIRATION RATE: 16 BRPM | WEIGHT: 199.2 LBS | OXYGEN SATURATION: 98 %

## 2017-09-07 DIAGNOSIS — R53.83 FATIGUE, UNSPECIFIED TYPE: ICD-10-CM

## 2017-09-07 DIAGNOSIS — D45 POLYCYTHEMIA VERA (HCC): Primary | Chronic | ICD-10-CM

## 2017-09-07 RX ORDER — VALACYCLOVIR HYDROCHLORIDE 500 MG/1
TABLET, FILM COATED ORAL
Refills: 0 | COMMUNITY
Start: 2017-08-31 | End: 2019-08-26 | Stop reason: SDUPTHER

## 2017-09-07 RX ORDER — DIPHENHYDRAMINE HCL 25 MG
25 CAPSULE ORAL
COMMUNITY
End: 2018-05-02 | Stop reason: ALTCHOICE

## 2017-09-07 RX ORDER — GUAIFENESIN 600 MG/1
600 TABLET, EXTENDED RELEASE ORAL 2 TIMES DAILY
COMMUNITY
End: 2017-10-04

## 2017-09-07 NOTE — PROGRESS NOTES
HEME/ONC CONSULT     Kiah Wu is a 63 y.o. 1955 male and presents with Abnormal Lab Results    CC  Polycythemia chronic 2006    HPI  Pt here for polycythemia 6 mo f/u on chronic phlebotomy. Pt has multiple medical problems and sees private PCP/ doctors and [facility]. Pt sees heme at [facility] also. C/o finger injury a few weeks ago. Pinched it. Healing. H/H stable. Cannot take ASA due to GI side effects. C/o same chronic symptoms and fatigue. Sober for 2 years in 11/17      Last visit:  Pt is a [facility] pt. Repeat bone marrow showed PV again. Pt has report. Pt has multiple symptoms chronically. Chronic HAs/ fatigue/ numbness/ vertigo/ anxiety. Pt is scared of falls. [facility] goes to [facility]. Pt sees PCP outside [facility] regularly. Pt monitors his H/H and phlebotomy based on symptoms. Pt is doing well here overall. DX   Encounter Diagnoses   Name Primary?     Polycythemia vera ([address] Yes    Fatigue, unspecified type         Past Medical History:   Diagnosis Date    Depression     GERD (gastroesophageal reflux disease)     Hypertension     Liver disease 1984    Fatty liver    Other ill-defined conditions     gallstones    Polycythemia vera ([address] 4/29/2013    Prostatitis     Sleep apnea 12/2011    doesn't use c-pap;  lost 25 lbs and has improved    Vertigo      Past Surgical History:   Procedure Laterality Date    HX CHOLECYSTECTOMY      HX COLONOSCOPY      HX ORTHOPAEDIC  1970    left wrist compound fracture    HX OTHER SURGICAL  2013    molar removal (dental)     Social History     Social History    Marital status:      Spouse name: N/A    Number of children: N/A    Years of education: N/A     Social History Main Topics    Smoking status: Current Every Day Smoker     Packs/day: 1.00     Years: 40.00    Smokeless tobacco: Never Used    Alcohol use No    Drug use: Yes     Special: Marijuana      Comment: rare    Sexual activity: No     Other Topics Concern    None Social History Narrative     Family History   Problem Relation Age of Onset    Cancer Father     Heart Disease Father     Heart Disease Mother     Kidney Disease Mother     Diabetes Brother        Current Outpatient Prescriptions   Medication Sig Dispense Refill    diphenhydrAMINE (BENADRYL) 25 mg capsule Take 25 mg by mouth every six (6) hours as needed.  guaiFENesin ER (MUCINEX) 600 mg ER tablet Take 600 mg by mouth two (2) times a day.  tamsulosin (FLOMAX) 0.4 mg capsule Take 1 Cap by mouth daily. 90 Cap 3    lisinopril (PRINIVIL, ZESTRIL) 5 mg tablet TAKE 1 TO 4 TABLETS BY MOUTH DAILY FOR BLOOD PRESSURE READINGS AS DIRECTED 360 Tab 0    traMADol (ULTRAM) 50 mg tablet TAKE 1 TABLET BY MOUTH EVERY 6 HOURS AS NEEDED FOR PAIN. MAX 200MG IN 1 DAY. 30 Tab 0    aspirin delayed-release 81 mg tablet Take 325 mg by mouth daily.  valACYclovir (VALTREX) 500 mg tablet TK 4 TS PO IN PRODROME AND 4 TS PO 12 H LATER  0    omeprazole (PRILOSEC) 20 mg capsule Take 20 mg by mouth Daily (before breakfast).  ciprofloxacin-dexamethasone (CIPRODEX) 0.3-0.1 % otic suspension Administer 4 Drops in left ear two (2) times a day.  cholecalciferol, vitamin D3, (VITAMIN D3) 2,000 unit tab Take  by mouth daily.  cyanocobalamin 1,000 mcg tablet Take 1,000 mcg by mouth daily.  Calcium-Mag-Vit B6-D3-Minerals 034-29-1-410 tx-tl-jc-unit tab Take  by mouth daily.  MULTIVIT-MIN/FA/LYCOPENE/LUT (CENTRUM SILVER ULTRA MEN'S PO) Take  by mouth.          Allergies   Allergen Reactions    Sulfur Anaphylaxis, Hives and Seizures    Zyprexa [Olanzapine] Anaphylaxis    Celexa [Citalopram] Other (comments)     groggy    Clindamycin Nausea and Vomiting    Lisinopril Other (comments)    Prozac [Fluoxetine] Anxiety    Risperidone Anxiety       Review of Systems    A comprehensive review of systems was negative except for: per HPI  multiple symptoms     Objective:  Visit Vitals    /87    Pulse 68  Temp 97.6 °F (36.4 °C) (Oral)    Resp 16    Ht 5' 10\" (1.778 m)    Wt 199 lb 3.2 oz (90.4 kg)    SpO2 98%    BMI 28.58 kg/m2         Physical Exam:   General appearance - alert, well nourished  Mental status - appropriately conversant. EYE conj clear  Mouth - mucous membranes moist  Neck - supple  CV regular   resp clear  GI soft  Ext-no pedal edema noted  Skin-Warm and dry. Neuro -nonfocal      Diagnostic Imaging   reviewed  Results for orders placed during the hospital encounter of 04/14/14   XR CHEST PA LAT    Narrative **Final Report**       ICD Codes / Adm. Diagnosis: 789.09  787.02 / pre op    Examination:  CR CHEST PA AND LATERAL  - [id number] - Apr 14 2014 10:18AM  Accession No:  [id number]  Reason:  preop      REPORT:  Indication: Congestion, cough, hypertension. Exam: PA and lateral views of the chest.    There is no prior study for direct comparison. Findings: Cardiomediastinal silhouette is within normal limits. Lungs are   clear bilaterally. Pleural spaces are normal. Osseous structures are intact. IMPRESSION: No acute cardiopulmonary disease. Signing/Reading Doctor: MELISSA Prince ([id number])    Approved: MELISSA RICARDO ([id number])  Apr 14 2014 10:28AM                                          Lab Results    reviewed  Lab Results   Component Value Date/Time    WBC 7.0 09/05/2017 04:50 PM    HGB 13.4 09/05/2017 04:50 PM    HCT 44.2 09/05/2017 04:50 PM    PLATELET 708 13/80/8565 04:50 PM    MCV 69 09/05/2017 04:50 PM       Lab Results   Component Value Date/Time    Sodium 141 08/22/2017 11:58 AM    Potassium 4.7 08/22/2017 11:58 AM    Chloride 102 08/22/2017 11:58 AM    CO2 21 08/22/2017 11:58 AM    Anion gap 10 03/09/2017 03:15 PM    Glucose 106 08/22/2017 11:58 AM    BUN 13 08/22/2017 11:58 AM    Creatinine 1.00 08/22/2017 11:58 AM    BUN/Creatinine ratio 13 08/22/2017 11:58 AM    GFR est AA 94 08/22/2017 11:58 AM    GFR est non-AA 81 08/22/2017 11:58 AM    Calcium 9.2 08/22/2017 11:58 AM    AST (SGOT) 17 08/22/2017 11:58 AM    Alk. phosphatase 74 08/22/2017 11:58 AM    Protein, total 6.9 08/22/2017 11:58 AM    Albumin 4.4 08/22/2017 11:58 AM    Globulin 3.5 03/09/2017 03:15 PM    A-G Ratio 1.8 08/22/2017 11:58 AM    ALT (SGPT) 21 08/22/2017 11:58 AM       .    Assessment/Plan:     Stoney Meek is a 56 y.o. 1955 male and presents with Abnormal Lab Results  . CC  poythyemia    HPI  Pt here for polycythemia f/u. DX  Polycythemia vera jose 2 +    1. chronic polycythemia vera jose 2 + dx at [facility] years ago.    had a repeat BM here that showed PV. H/H has been stable. Continue same treatment plan.      labs q 4 weeks    Phlebotomy for 44 or greater per pt request as was done at the [facility] prior to here. Goal is to spread out phlebotomy as possible. 2.  Other chronic / medical problems per PCP/ [facility]. Call if questions. F/u with me in 6 months. ICD-10-CM ICD-9-CM    1. Polycythemia vera ([facility] D45 238.4    2. Fatigue, unspecified type R53.83 780.79      Orders Placed This Encounter    valACYclovir (VALTREX) 500 mg tablet     Sig: TK 4 TS PO IN PRODROME AND 4 TS PO 12 H LATER     Refill:  0    diphenhydrAMINE (BENADRYL) 25 mg capsule     Sig: Take 25 mg by mouth every six (6) hours as needed.  guaiFENesin ER (MUCINEX) 600 mg ER tablet     Sig: Take 600 mg by mouth two (2) times a day. routine labs ordered, call if any problems  There are no Patient Instructions on file for this visit. Follow-up Disposition:  Return in about 6 months (around 3/7/2018).     Joshua Martinez DO

## 2017-09-07 NOTE — MR AVS SNAPSHOT
Visit Information Date & Time Provider Department Dept. Phone Encounter #  
 9/7/2017  2:00 PM [address]  Oncology at [address] [id number] Follow-up Instructions Return in about 6 months (around 3/7/2018). Routing History Follow-up and Disposition History Your Appointments 3/8/2018  2:00 PM  
Follow Up with Josefa Jhaveri DO [facility] Oncology at [facility] Appt Note: 6 month f/u-Thrombocytopenia [address] [facility] [address] [facility] [address] [address]  
[phone]  
  
   
 [address] [facility] [address] Upcoming Health Maintenance Date Due Hepatitis C Screening 1955 DTaP/Tdap/Td series (1 - Tdap) 7/24/2004 Pneumococcal 19-64 Highest Risk (3 of 3 - PCV13) 1/12/2017 ZOSTER VACCINE AGE 60> 4/23/2017 INFLUENZA AGE 9 TO ADULT 8/1/2017 COLONOSCOPY 1/19/2025 Allergies as of 9/7/2017  Review Complete On: 9/7/2017 By: Josefa Jhaveri DO Severity Noted Reaction Type Reactions Sulfur High 06/15/2011   Systemic Anaphylaxis, Hives, Seizures Zyprexa [Olanzapine] High 07/23/2013    Anaphylaxis Celexa [Citalopram]  07/23/2013    Other (comments) groggy Clindamycin  07/23/2013    Nausea and Vomiting Lisinopril  02/23/2015    Other (comments) Prozac [Fluoxetine]  07/23/2013    Anxiety Risperidone  07/23/2013    Anxiety Current Immunizations  Reviewed on 9/7/2017 Name Date Influenza High Dose Vaccine PF 11/12/2015 Influenza Vaccine 10/23/2012 Influenza Vaccine Ericatte Dom) 10/13/2016 Influenza Vaccine PF 9/30/2013 Pneumococcal Polysaccharide (PPSV-23) 1/12/2016 Pneumococcal Vaccine (Unspecified Type) 7/23/2012 Td 7/23/2004 Reviewed by Epi Pugh LPN on 8/1/7592 at  8:27 PM  
 Reviewed by Epi Pugh LPN on 8/3/0996 at  3:16 PM  
You Were Diagnosed With   
  
 Codes Comments Polycythemia vera ([facility]    -  Primary ICD-10-CM: X83 ICD-9-CM: 238.4 Fatigue, unspecified type     ICD-10-CM: R53.83 ICD-9-CM: 780.79 Vitals BP Pulse Temp Resp Height(growth percentile) Weight(growth percentile) 144/87 68 97.6 °F (36.4 °C) (Oral) 16 5' 10\" (1.778 m) 199 lb 3.2 oz (90.4 kg) SpO2 BMI Smoking Status 98% 28.58 kg/m2 Current Every Day Smoker Vitals History BMI and BSA Data Body Mass Index Body Surface Area 28.58 kg/m 2 2.11 m 2 Preferred Pharmacy Pharmacy Name Phone [facility] [address] [place] [address] [phone] Your Updated Medication List  
  
   
This list is accurate as of: 9/7/17  2:58 PM.  Always use your most recent med list.  
  
  
  
  
 aspirin delayed-release 81 mg tablet Take 325 mg by mouth daily. BENADRYL 25 mg capsule Generic drug:  diphenhydrAMINE Take 25 mg by mouth every six (6) hours as needed. Calcium-Mag-Vit B6-D3-Minerals 825-51-2-125 au-sz-iv-unit Tab Take  by mouth daily. CENTRUM SILVER ULTRA MEN'S PO Take  by mouth. ciprofloxacin-dexamethasone 0.3-0.1 % otic suspension Commonly known as:  Diana Donnell Administer 4 Drops in left ear two (2) times a day. cyanocobalamin 1,000 mcg tablet Take 1,000 mcg by mouth daily. lisinopril 5 mg tablet Commonly known as:  PRINIVIL, ZESTRIL  
TAKE 1 TO 4 TABLETS BY MOUTH DAILY FOR BLOOD PRESSURE READINGS AS DIRECTED  
  
 MUCINEX 600 mg ER tablet Generic drug:  guaiFENesin ER Take 600 mg by mouth two (2) times a day. omeprazole 20 mg capsule Commonly known as:  PRILOSEC Take 20 mg by mouth Daily (before breakfast). tamsulosin 0.4 mg capsule Commonly known as:  FLOMAX Take 1 Cap by mouth daily. traMADol 50 mg tablet Commonly known as:  ULTRAM  
TAKE 1 TABLET BY MOUTH EVERY 6 HOURS AS NEEDED FOR PAIN. MAX 200MG IN 1 DAY. valACYclovir 500 mg tablet Commonly known as:  VALTREX TK 4 TS PO IN PRODROME AND 4 TS PO 12 H LATER  
  
 VITAMIN D3 2,000 unit Tab Generic drug:  cholecalciferol (vitamin D3) Take  by mouth daily. Follow-up Instructions Return in about 6 months (around 3/7/2018). To-Do List   
 09/08/2017 4:00 PM  
  Appointment with [facility] ([phone]) 10/06/2017 4:00 PM  
  Appointment with [facility] ([phone])  
  
 11/03/2017 4:00 PM  
  Appointment with [facility] ([phone]) 12/01/2017 4:00 PM  
  Appointment with [facility] ([phone])  
  
 12/29/2017 4:00 PM  
  Appointment with [facility] ([phone])  
  
 01/26/2018 4:00 PM  
  Appointment with [facility] ([phone])  
  
 02/23/2018 4:00 PM  
  Appointment with [facility] ([phone])  
  
 03/23/2018 4:00 PM  
  Appointment with [facility] ([phone])  
  
 04/20/2018 4:00 PM  
  Appointment with [facility] ([phone])  
  
 05/18/2018 4:00 PM  
  Appointment with [facility] ([phone])  
  
 06/15/2018 4:00 PM  
  Appointment with [facility] ([phone]) Introducing [facility] SERVICES! Dear Nicki Mortimer: Thank you for requesting a [employer] account. Our records indicate that you already have an active [employer] account. You can access your account anytime at [email or web address] Did you know that you can access your hospital and ER discharge instructions at any time in [employer]? You can also review all of your test results from your hospital stay or ER visit. Additional Information If you have questions, please visit the Frequently Asked Questions section of the MyChart website at [email or web address] Remember, Altar is NOT to be used for urgent needs. For medical emergencies, dial 911. Now available from your iPhone and Android! Please provide this summary of care documentation to your next provider. Your primary care clinician is listed as TIMUR LANDAVERDE. If you have any questions after today's visit, please call [phone].

## 2017-09-07 NOTE — PROGRESS NOTES
Debora Gonsalves is a 63 y.o. male here today for follow up of thrombocytopenia. He is asking about his finger (right hand pinky finger)  Wants to know how long it would take to heal after pinching/smashing it in door about 2-3 weeks ago. Thinks he may have broken a blood vessel. States he has ringing to left ear at times. Complains of general malaise, discomfort to abdomen.

## 2017-09-08 ENCOUNTER — APPOINTMENT (OUTPATIENT)
Dept: INFUSION THERAPY | Age: 62
End: 2017-09-08

## 2017-09-11 ENCOUNTER — DOCUMENTATION ONLY (OUTPATIENT)
Dept: FAMILY MEDICINE CLINIC | Age: 62
End: 2017-09-11

## 2017-09-20 LAB
BASOPHILS # BLD AUTO: 0.1 X10E3/UL (ref 0–0.2)
BASOPHILS NFR BLD AUTO: 1 %
EOSINOPHIL # BLD AUTO: 0.2 X10E3/UL (ref 0–0.4)
EOSINOPHIL NFR BLD AUTO: 3 %
ERYTHROCYTE [DISTWIDTH] IN BLOOD BY AUTOMATED COUNT: 19.2 % (ref 12.3–15.4)
HCT VFR BLD AUTO: 43 % (ref 37.5–51)
HGB BLD-MCNC: 13.6 G/DL (ref 12.6–17.7)
IMM GRANULOCYTES # BLD: 0 X10E3/UL (ref 0–0.1)
IMM GRANULOCYTES NFR BLD: 0 %
LYMPHOCYTES # BLD AUTO: 3.4 X10E3/UL (ref 0.7–3.1)
LYMPHOCYTES NFR BLD AUTO: 38 %
MCH RBC QN AUTO: 21.4 PG (ref 26.6–33)
MCHC RBC AUTO-ENTMCNC: 31.6 G/DL (ref 31.5–35.7)
MCV RBC AUTO: 68 FL (ref 79–97)
MONOCYTES # BLD AUTO: 0.8 X10E3/UL (ref 0.1–0.9)
MONOCYTES NFR BLD AUTO: 8 %
NEUTROPHILS # BLD AUTO: 4.5 X10E3/UL (ref 1.4–7)
NEUTROPHILS NFR BLD AUTO: 50 %
PLATELET # BLD AUTO: 542 X10E3/UL (ref 150–379)
RBC # BLD AUTO: 6.35 X10E6/UL (ref 4.14–5.8)
WBC # BLD AUTO: 9 X10E3/UL (ref 3.4–10.8)

## 2017-09-22 ENCOUNTER — HOSPITAL ENCOUNTER (OUTPATIENT)
Dept: INFUSION THERAPY | Age: 62
End: 2017-09-22

## 2017-10-03 LAB
BASOPHILS # BLD AUTO: 0.1 X10E3/UL (ref 0–0.2)
BASOPHILS NFR BLD AUTO: 1 %
EOSINOPHIL # BLD AUTO: 0.2 X10E3/UL (ref 0–0.4)
EOSINOPHIL NFR BLD AUTO: 2 %
ERYTHROCYTE [DISTWIDTH] IN BLOOD BY AUTOMATED COUNT: 19.9 % (ref 12.3–15.4)
HCT VFR BLD AUTO: 46.4 % (ref 37.5–51)
HGB BLD-MCNC: 14.7 G/DL (ref 12.6–17.7)
IMM GRANULOCYTES # BLD: 0 X10E3/UL (ref 0–0.1)
IMM GRANULOCYTES NFR BLD: 0 %
LYMPHOCYTES # BLD AUTO: 2.5 X10E3/UL (ref 0.7–3.1)
LYMPHOCYTES NFR BLD AUTO: 28 %
MCH RBC QN AUTO: 21.3 PG (ref 26.6–33)
MCHC RBC AUTO-ENTMCNC: 31.7 G/DL (ref 31.5–35.7)
MCV RBC AUTO: 67 FL (ref 79–97)
MONOCYTES # BLD AUTO: 0.5 X10E3/UL (ref 0.1–0.9)
MONOCYTES NFR BLD AUTO: 6 %
NEUTROPHILS # BLD AUTO: 5.6 X10E3/UL (ref 1.4–7)
NEUTROPHILS NFR BLD AUTO: 63 %
PLATELET # BLD AUTO: 505 X10E3/UL (ref 150–379)
RBC # BLD AUTO: 6.91 X10E6/UL (ref 4.14–5.8)
WBC # BLD AUTO: 8.8 X10E3/UL (ref 3.4–10.8)

## 2017-10-04 ENCOUNTER — HOSPITAL ENCOUNTER (OUTPATIENT)
Dept: INFUSION THERAPY | Age: 62
Discharge: HOME | End: 2017-10-04
Payer: COMMERCIAL

## 2017-10-04 VITALS
TEMPERATURE: 97.2 F | HEART RATE: 60 BPM | RESPIRATION RATE: 18 BRPM | DIASTOLIC BLOOD PRESSURE: 85 MMHG | OXYGEN SATURATION: 100 % | SYSTOLIC BLOOD PRESSURE: 153 MMHG

## 2017-10-04 PROCEDURE — 99195 PHLEBOTOMY: CPT

## 2017-10-04 NOTE — PROGRESS NOTES
Outpatient Infusion Center Progress Note    6718 Pt admit to [facility] for therapeutic phlebotomy ambulatory in stable condition. Assessment completed. No new concerns voiced. Labs were drawn at [employer] on 10/2/17 indicating patient did need phlebotomy. Therapeutic phlebotomy performed per protocol with 16 gauge needle in left arm. Blood draining very slowly. 150 mL red blood removed and discarded before blood clotted. Needle removed, pressure applied to site, and site wrapped with coban. Patient declined to be stuck again for additional 350 mL to be removed. Patient discharged in stable condition. Visit Vitals    /85 (BP 1 Location: Left arm, BP Patient Position: Sitting)    Pulse 60    Temp 97.2 °F (36.2 °C)    Resp 18    SpO2 100%         1705 Pt tolerated treatment well. D/c home ambulatory in no distress. Pt aware of next appointment scheduled for 10/20/17 at 2:00. Please see lab results from 10/2/17 in [facility].

## 2017-10-06 ENCOUNTER — HOSPITAL ENCOUNTER (OUTPATIENT)
Dept: INFUSION THERAPY | Age: 62
End: 2017-10-06
Payer: COMMERCIAL

## 2017-10-06 ENCOUNTER — APPOINTMENT (OUTPATIENT)
Dept: INFUSION THERAPY | Age: 62
End: 2017-10-06

## 2017-10-09 ENCOUNTER — TELEPHONE (OUTPATIENT)
Dept: ONCOLOGY | Age: 62
End: 2017-10-09

## 2017-10-13 ENCOUNTER — TELEPHONE (OUTPATIENT)
Dept: ONCOLOGY | Age: 62
End: 2017-10-13

## 2017-10-13 NOTE — TELEPHONE ENCOUNTER
HIPAA verified. Stated increased frequency of headaches over past few days. Stated taking tramodol with relief. Stated having \"liver pain\"/itching. Also stated ear aches/sinus drainage. Concerned that only had 100 ml blood withdrawn with last phlebotomy and has rescheduled for Monday. Concerned about elevated BP and following with PCP. Is on lisinopril 20mg daily for past 2 weeks. Stated left message with PCP office and has not recd return call. Stated wanted to increase lisinopril to 25mg daily. Advised may need to proceed to ED for eval of multiple sx, but would send message to provider. Verbalized understanding.

## 2017-10-13 NOTE — TELEPHONE ENCOUNTER
Recommend patient follow-up with PCP for evaluation of symptoms. For severe headaches, he should be advised to proceed to the ED. Continue with phlebotomy on Monday as scheduled.

## 2017-10-13 NOTE — TELEPHONE ENCOUNTER
HIPAA verified. Advised of provider note. Verbalized understanding. Reviewed sx of elevated BP and headache to report to ED via [employer]. Verbalized understanding and thanked for call.

## 2017-10-16 ENCOUNTER — HOSPITAL ENCOUNTER (OUTPATIENT)
Dept: INFUSION THERAPY | Age: 62
Discharge: HOME | End: 2017-10-16
Payer: COMMERCIAL

## 2017-10-16 VITALS
DIASTOLIC BLOOD PRESSURE: 78 MMHG | RESPIRATION RATE: 18 BRPM | TEMPERATURE: 97.8 F | HEART RATE: 60 BPM | SYSTOLIC BLOOD PRESSURE: 167 MMHG

## 2017-10-16 LAB
BASOPHILS # BLD: 0.1 K/UL (ref 0–0.1)
BASOPHILS NFR BLD: 1 % (ref 0–1)
EOSINOPHIL # BLD: 0.2 K/UL (ref 0–0.4)
EOSINOPHIL NFR BLD: 3 % (ref 0–7)
ERYTHROCYTE [DISTWIDTH] IN BLOOD BY AUTOMATED COUNT: 20.3 % (ref 11.5–14.5)
HCT VFR BLD AUTO: 44.4 % (ref 36.6–50.3)
HGB BLD-MCNC: 14.1 G/DL (ref 12.1–17)
LYMPHOCYTES # BLD: 2.8 K/UL (ref 0.8–3.5)
LYMPHOCYTES NFR BLD: 36 % (ref 12–49)
MCH RBC QN AUTO: 22.2 PG (ref 26–34)
MCHC RBC AUTO-ENTMCNC: 31.8 G/DL (ref 30–36.5)
MCV RBC AUTO: 70 FL (ref 80–99)
MONOCYTES # BLD: 0.6 K/UL (ref 0–1)
MONOCYTES NFR BLD: 7 % (ref 5–13)
NEUTS SEG # BLD: 4.2 K/UL (ref 1.8–8)
NEUTS SEG NFR BLD: 53 % (ref 32–75)
PLATELET # BLD AUTO: 553 K/UL (ref 150–400)
RBC # BLD AUTO: 6.34 M/UL (ref 4.1–5.7)
RBC MORPH BLD: ABNORMAL
WBC # BLD AUTO: 7.9 K/UL (ref 4.1–11.1)

## 2017-10-16 PROCEDURE — 85025 COMPLETE CBC W/AUTO DIFF WBC: CPT | Performed by: INTERNAL MEDICINE

## 2017-10-16 PROCEDURE — 36415 COLL VENOUS BLD VENIPUNCTURE: CPT | Performed by: INTERNAL MEDICINE

## 2017-10-16 NOTE — PROGRESS NOTES
Outpatient Infusion Center Progress Note    1410 Pt admit to [facility] for labs and possible therapeutic phlebotomy ambulatory in stable condition. Assessment completed. No new concerns voiced. Labs drawn per protocol and sent for processing. Per lab results no phlebotomy. Visit Vitals    /78 (BP 1 Location: Left arm, BP Patient Position: Sitting)    Pulse 60    Temp 97.8 °F (36.6 °C)    Resp 18         1515 Pt tolerated treatment well. D/c home ambulatory in no distress. Pt aware of next appointment scheduled for 10/30/17 at 2:00 for labs and possible therapeutic phlebotomy. Recent Results (from the past 12 hour(s))   CBC WITH AUTOMATED DIFF    Collection Time: 10/16/17  2:13 PM   Result Value Ref Range    WBC 7.9 4.1 - 11.1 K/uL    RBC 6.34 (H) 4.10 - 5.70 M/uL    HGB 14.1 12.1 - 17.0 g/dL    HCT 44.4 36.6 - 50.3 %    MCV 70.0 (L) 80.0 - 99.0 FL    MCH 22.2 (L) 26.0 - 34.0 PG    MCHC 31.8 30.0 - 36.5 g/dL    RDW 20.3 (H) 11.5 - 14.5 %    PLATELET 582 (H) 235 - 400 K/uL    NEUTROPHILS PENDING %    LYMPHOCYTES PENDING %    MONOCYTES PENDING %    EOSINOPHILS PENDING %    BASOPHILS PENDING %    ABS. NEUTROPHILS PENDING K/UL    ABS. LYMPHOCYTES PENDING K/UL    ABS. MONOCYTES PENDING K/UL    ABS. EOSINOPHILS PENDING K/UL    ABS.  BASOPHILS PENDING K/UL    DF PENDING

## 2017-10-17 ENCOUNTER — OFFICE VISIT (OUTPATIENT)
Dept: FAMILY MEDICINE CLINIC | Age: 62
End: 2017-10-17

## 2017-10-17 VITALS
SYSTOLIC BLOOD PRESSURE: 177 MMHG | BODY MASS INDEX: 28.49 KG/M2 | HEIGHT: 70 IN | TEMPERATURE: 97.9 F | DIASTOLIC BLOOD PRESSURE: 102 MMHG | HEART RATE: 54 BPM | WEIGHT: 199 LBS | RESPIRATION RATE: 18 BRPM | OXYGEN SATURATION: 98 %

## 2017-10-17 DIAGNOSIS — I10 ESSENTIAL HYPERTENSION, BENIGN: Primary | ICD-10-CM

## 2017-10-17 DIAGNOSIS — D45 POLYCYTHEMIA VERA (HCC): Chronic | ICD-10-CM

## 2017-10-17 RX ORDER — LOSARTAN POTASSIUM AND HYDROCHLOROTHIAZIDE 25; 100 MG/1; MG/1
1 TABLET ORAL DAILY
Qty: 90 TAB | Refills: 1 | Status: SHIPPED | OUTPATIENT
Start: 2017-10-17 | End: 2018-05-02 | Stop reason: ALTCHOICE

## 2017-10-17 NOTE — MR AVS SNAPSHOT
Visit Information Date & Time Provider Department Dept. Phone Encounter #  
 10/17/2017  2:50 PM Abisai Whipple MD [address] [facility] [phone] [id number] Follow-up Instructions Return in about 1 month (around 11/17/2017). Your Appointments 3/8/2018  2:00 PM  
Follow Up with Holly Leiva  [facility] Oncology at [facility]) Appt Note: 6 month f/u-Thrombocytopenia [address] [facility] [address] [address]  
[phone]  
  
   
 [address] [facility] [address] Upcoming Health Maintenance Date Due Hepatitis C Screening 1955 DTaP/Tdap/Td series (1 - Tdap) 7/24/2004 Pneumococcal 19-64 Highest Risk (3 of 3 - PCV13) 1/12/2017 ZOSTER VACCINE AGE 60> 4/23/2017 INFLUENZA AGE 9 TO ADULT 8/1/2017 COLONOSCOPY 1/19/2025 Allergies as of 10/17/2017  Review Complete On: 10/17/2017 By: Abisai Whipple MD  
  
 Severity Noted Reaction Type Reactions Sulfur High 06/15/2011   Systemic Anaphylaxis, Hives, Seizures Zyprexa [Olanzapine] High 07/23/2013    Anaphylaxis Celexa [Citalopram]  07/23/2013    Other (comments) groggy Clindamycin  07/23/2013    Nausea and Vomiting Lisinopril  02/23/2015    Other (comments) Prozac [Fluoxetine]  07/23/2013    Anxiety Risperidone  07/23/2013    Anxiety Current Immunizations  Reviewed on 10/16/2017 Name Date Influenza High Dose Vaccine PF 11/12/2015 Influenza Vaccine 10/23/2012 Influenza Vaccine Juan J Goring) 10/13/2016 Influenza Vaccine PF 9/30/2013 Pneumococcal Polysaccharide (PPSV-23) 1/12/2016 Pneumococcal Vaccine (Unspecified Type) 7/23/2012 Td 7/23/2004 Not reviewed this visit You Were Diagnosed With   
  
 Codes Comments Essential hypertension, benign    -  Primary ICD-10-CM: I10 
ICD-9-CM: 401.1 Polycythemia vera ([facility]     ICD-10-CM: S25 ICD-9-CM: 238.4 Vitals  BP Pulse Temp Resp Height(growth percentile) Weight(growth percentile) (!) 177/102 (!) 54 97.9 °F (36.6 °C) (Oral) 18 5' 10\" (1.778 m) 199 lb (90.3 kg) SpO2 BMI Smoking Status 98% 28.55 kg/m2 Current Every Day Smoker BMI and BSA Data Body Mass Index Body Surface Area 28.55 kg/m 2 2.11 m 2 Preferred Pharmacy Pharmacy Name Phone [facility] [address] [place] [address], [address] [place] [address] [phone] Your Updated Medication List  
  
   
This list is accurate as of: 10/17/17  3:54 PM.  Always use your most recent med list.  
  
  
  
  
 BENADRYL 25 mg capsule Generic drug:  diphenhydrAMINE Take 25 mg by mouth every six (6) hours as needed. ciprofloxacin-dexamethasone 0.3-0.1 % otic suspension Commonly known as:  Lemond House Administer 4 Drops in left ear two (2) times a day. losartan-hydroCHLOROthiazide 100-25 mg per tablet Commonly known as:  HYZAAR Take 1 Tab by mouth daily. Indications: hypertension  
  
 tamsulosin 0.4 mg capsule Commonly known as:  FLOMAX Take 1 Cap by mouth daily. traMADol 50 mg tablet Commonly known as:  ULTRAM  
TAKE 1 TABLET BY MOUTH EVERY 6 HOURS AS NEEDED FOR PAIN. MAX 200MG IN 1 DAY. valACYclovir 500 mg tablet Commonly known as:  VALTREX TK 4 TS PO IN PRODROME AND 4 TS PO 12 H LATER Prescriptions Sent to Pharmacy Refills  
 losartan-hydroCHLOROthiazide (HYZAAR) 100-25 mg per tablet 1 Sig: Take 1 Tab by mouth daily. Indications: hypertension Class: Normal  
 Pharmacy: [facility] [address] [place], [place] [address]y [address] [facility] [address]  #: [phone] Route: Oral  
  
Follow-up Instructions Return in about 1 month (around 11/17/2017). To-Do List   
 10/30/2017 2:00 PM  
  Appointment with Anahy Weeks at [facility] ([phone])  
  
 11/13/2017 2:00 PM  
  Appointment with [place] [facility] NURSE 5 at [facility] ([phone]) Introducing [facility] HE[facility] SERVICES! Dear Cesar : Thank you for requesting a [employer] account. Our records indicate that you already have an active [employer] account. You can access your account anytime at [email or web address] Did you know that you can access your hospital and ER discharge instructions at any time in [employer]? You can also review all of your test results from your hospital stay or ER visit. Additional Information If you have questions, please visit the Frequently Asked Questions section of the [employer] website at [email or web address] Remember, [employer] is NOT to be used for urgent needs. For medical emergencies, dial 911. Now available from your iPhone and Android! Please provide this summary of care documentation to your next provider. Your primary care clinician is listed as TIMUR LANDAVERDE. If you have any questions after today's visit, please call [phone].

## 2017-10-17 NOTE — PROGRESS NOTES
Chief Complaint   Patient presents with    Head Pain    Hypertension     Pt presents to the office for head pain, HTN    1. Have you been to the ER, urgent care clinic since your last visit? Hospitalized since your last visit? No    2. Have you seen or consulted any other health care providers outside of the [address] [place] [address] since your last visit? Include any pap smears or colon screening. No  ]      Chief Complaint   Patient presents with    Head Pain    Hypertension     He is a 63 y.o. male who presents for evalution. Reviewed PmHx, RxHx, FmHx, SocHx, AllgHx and updated and dated in the chart. Patient Active Problem List    Diagnosis    Benign prostatic hyperplasia    Chronic nonintractable headache    Anxiety and depression    Vertigo    Gallstones    Ringing in ears    Fatigue    Polycythemia vera ([address]    Mixed hyperlipidemia    LAINE (obstructive sleep apnea)    Essential hypertension, benign    Depression       Review of Systems - negative except as listed above in the HPI    Objective:     Vitals:    10/17/17 1538   BP: (!) 177/102   Pulse: (!) 54   Resp: 18   Temp: 97.9 °F (36.6 °C)   TempSrc: Oral   SpO2: 98%   Weight: 199 lb (90.3 kg)   Height: 5' 10\" (1.778 m)     Physical Examination: General appearance - alert, well appearing, and in no distress  Neck - supple, no significant adenopathy  Chest - clear to auscultation, no wheezes, rales or rhonchi, symmetric air entry  Heart - normal rate, regular rhythm, normal S1, S2, no murmurs, rubs, clicks or gallops    Assessment/ Plan:   Diagnoses and all orders for this visit:    1. Essential hypertension, benign  -     losartan-hydroCHLOROthiazide (HYZAAR) 100-25 mg per tablet; Take 1 Tab by mouth daily. Indications: hypertension   -dc ace and add rx due to inc BP    2. Polycythemia vera ([address]  -seeing Heme       Follow-up Disposition:  Return in about 1 month (around 11/17/2017).     I have discussed the diagnosis with the patient and the intended plan as seen in the above orders. The patient understands and agrees with the plan. The patient has received an after-visit summary and questions were answered concerning future plans. Medication Side Effects and Warnings were discussed with patient  Patient Labs were reviewed and or requested:  Patient Past Records were reviewed and or requested    Saima Powers M.D. There are no Patient Instructions on file for this visit.

## 2017-10-20 ENCOUNTER — APPOINTMENT (OUTPATIENT)
Dept: INFUSION THERAPY | Age: 62
End: 2017-10-20
Payer: COMMERCIAL

## 2017-10-26 NOTE — TELEPHONE ENCOUNTER
Call returned to patient. Advised RN recd message to return call 21 minutes ago. Stated was seen by [facility] and advised that labs showed phlebotomy not needed. Patient stated BP is remains elevated and thinks has too much blood volume and wanted a phlebotomy. Stated saw PCP and was prescribed a med for BP which contained a sulfa drug. Stated stopped BP med. Stated went to [place] ED for second opinion. Stated had lab done by [place] and Hct was 46.7 and had phlebotomy with relief of symptoms. Expressed concern with last [facility] visit. Stated requested [facility] RN to try 20 gauge needle due to repeated venipunctures. Patient stated [facility] nurse declined request.  Stated 100ml phlebotomized and was offered a second stix, but patient declined. Stated was not upset with our office, but is losing confidence with [facility]. Active listening utilized. Advised would forward message to SKYE KENROY [facility]. Support given.       (19:30min)

## 2017-10-26 NOTE — TELEPHONE ENCOUNTER
Shae Manzano (pt) called in [address] [facility]. Pt stated he is very unhappy with his care. Pt stated he feels as thou no one is listening to his needs. Pt stated he spoke w/ Chacha Montoya last week and told her he need to get his \"volume\" down and until the his lisinopril was not going to work properly. Pt stated he also told Chacha Montoya & nurse downstairs that they need to use smaller needles which would cause less damage. Silvetsre Calderon ? ? ? Pt is requesting a c/b today.  Pt contact # [phone]

## 2017-10-27 NOTE — TELEPHONE ENCOUNTER
Orders for phlebotomy are for monthly visits with orders to increase to every 2 weeks if needed for patient symptoms. Can increase frequency of lab checks and phlebotomy as needed to every 2 weeks if patient desires. Would recommend he continue to follow-up with his PCP for management of hypertension. He should contact his PCP for concerns with medications that were prescribed by that office.

## 2017-10-27 NOTE — TELEPHONE ENCOUNTER
Spoke with patient HIPAA verified. Encouraged to speak with PCP concerning any medications that was prescribed by them. He is currently taking the lisinopril but stopped the sulfa drug. Again, advised to contact PCP. Verbalized understanding. He has and [facility] appointment scheduled 10/30 and is aware of that appointment. He \"does not want to give us the wrong impression\"  He is \"pleased with his care\". He expressed frustration over being a \"pin cushion\" in [place]. Advised we had forwarded his concerns to the [facility] and hopefully they can come to an agreement. Reviewed regulations and procedures as applicable to [facility]. He is aware of other OPICs in the area.       Thanked for call

## 2017-10-28 NOTE — TELEPHONE ENCOUNTER
Pt can set his lab/ phlebotomy schedule however he wants / he has been dealing with blood problem for a long time

## 2017-10-30 ENCOUNTER — APPOINTMENT (OUTPATIENT)
Dept: INFUSION THERAPY | Age: 62
End: 2017-10-30
Payer: COMMERCIAL

## 2017-11-01 RX ORDER — DOXYCYCLINE 100 MG/1
100 CAPSULE ORAL 2 TIMES DAILY
Qty: 20 CAP | Refills: 0 | Status: SHIPPED | OUTPATIENT
Start: 2017-11-01 | End: 2017-11-11

## 2017-11-03 ENCOUNTER — APPOINTMENT (OUTPATIENT)
Dept: INFUSION THERAPY | Age: 62
End: 2017-11-03

## 2017-11-03 ENCOUNTER — APPOINTMENT (OUTPATIENT)
Dept: INFUSION THERAPY | Age: 62
End: 2017-11-03
Payer: COMMERCIAL

## 2017-11-12 LAB
ALBUMIN SERPL-MCNC: 4.3 G/DL (ref 3.6–4.8)
ALBUMIN/GLOB SERPL: 1.7 {RATIO} (ref 1.2–2.2)
ALP SERPL-CCNC: 75 IU/L (ref 39–117)
ALT SERPL-CCNC: 22 IU/L (ref 0–44)
AST SERPL-CCNC: 12 IU/L (ref 0–40)
BASOPHILS # BLD AUTO: 0.1 X10E3/UL (ref 0–0.2)
BASOPHILS NFR BLD AUTO: 1 %
BILIRUB SERPL-MCNC: 0.3 MG/DL (ref 0–1.2)
BUN SERPL-MCNC: 19 MG/DL (ref 8–27)
BUN/CREAT SERPL: 19 (ref 10–24)
CALCIUM SERPL-MCNC: 9.4 MG/DL (ref 8.6–10.2)
CHLORIDE SERPL-SCNC: 100 MMOL/L (ref 96–106)
CHOLEST SERPL-MCNC: 121 MG/DL (ref 100–199)
CO2 SERPL-SCNC: 24 MMOL/L (ref 18–29)
CREAT SERPL-MCNC: 0.98 MG/DL (ref 0.76–1.27)
EOSINOPHIL # BLD AUTO: 0.2 X10E3/UL (ref 0–0.4)
EOSINOPHIL NFR BLD AUTO: 3 %
ERYTHROCYTE [DISTWIDTH] IN BLOOD BY AUTOMATED COUNT: 19.6 % (ref 12.3–15.4)
GFR SERPLBLD CREATININE-BSD FMLA CKD-EPI: 83 ML/MIN/1.73
GFR SERPLBLD CREATININE-BSD FMLA CKD-EPI: 96 ML/MIN/1.73
GLOBULIN SER CALC-MCNC: 2.6 G/DL (ref 1.5–4.5)
GLUCOSE SERPL-MCNC: 152 MG/DL (ref 65–99)
HCT VFR BLD AUTO: 42.8 % (ref 37.5–51)
HDLC SERPL-MCNC: 30 MG/DL
HGB BLD-MCNC: 13.4 G/DL (ref 12.6–17.7)
IMM GRANULOCYTES # BLD: 0 X10E3/UL (ref 0–0.1)
IMM GRANULOCYTES NFR BLD: 0 %
INTERPRETATION, 910389: NORMAL
LDLC SERPL CALC-MCNC: 61 MG/DL (ref 0–99)
LYMPHOCYTES # BLD AUTO: 3.2 X10E3/UL (ref 0.7–3.1)
LYMPHOCYTES NFR BLD AUTO: 41 %
MCH RBC QN AUTO: 22.3 PG (ref 26.6–33)
MCHC RBC AUTO-ENTMCNC: 31.3 G/DL (ref 31.5–35.7)
MCV RBC AUTO: 71 FL (ref 79–97)
MONOCYTES # BLD AUTO: 0.5 X10E3/UL (ref 0.1–0.9)
MONOCYTES NFR BLD AUTO: 6 %
NEUTROPHILS # BLD AUTO: 3.9 X10E3/UL (ref 1.4–7)
NEUTROPHILS NFR BLD AUTO: 49 %
PLATELET # BLD AUTO: 566 X10E3/UL (ref 150–379)
POTASSIUM SERPL-SCNC: 4.6 MMOL/L (ref 3.5–5.2)
PROT SERPL-MCNC: 6.9 G/DL (ref 6–8.5)
PSA SERPL-MCNC: 1 NG/ML (ref 0–4)
RBC # BLD AUTO: 6.01 X10E6/UL (ref 4.14–5.8)
SODIUM SERPL-SCNC: 142 MMOL/L (ref 134–144)
TRIGL SERPL-MCNC: 150 MG/DL (ref 0–149)
TSH SERPL DL<=0.005 MIU/L-ACNC: 2.59 UIU/ML (ref 0.45–4.5)
VLDLC SERPL CALC-MCNC: 30 MG/DL (ref 5–40)
WBC # BLD AUTO: 7.8 X10E3/UL (ref 3.4–10.8)

## 2017-11-13 ENCOUNTER — HOSPITAL ENCOUNTER (OUTPATIENT)
Dept: INFUSION THERAPY | Age: 62
End: 2017-11-13
Payer: COMMERCIAL

## 2017-11-14 ENCOUNTER — TELEPHONE (OUTPATIENT)
Dept: FAMILY MEDICINE CLINIC | Age: 62
End: 2017-11-14

## 2017-11-14 NOTE — TELEPHONE ENCOUNTER
Patient would like a call reguarding his lab results so he may take paperwork with him to the [address] [place] [address].  Please call patient at: [phone]

## 2017-11-15 NOTE — TELEPHONE ENCOUNTER
Patient called at the number on file. A voicemail was left with instructions to call back at the patient's earliest convinence.

## 2017-11-15 NOTE — TELEPHONE ENCOUNTER
Pt calling Papito Stauffer back and wants to know if he can fax the paperwork today to the [place] at [phone]. Please call him at [phone] if you have any questions.

## 2017-11-16 NOTE — TELEPHONE ENCOUNTER
----- Message from Marichuy White sent at 11/15/2017  5:38 PM EST -----  Regarding: Dr. Celestine Alvarado  Pt needs the paperwork faxed to [facility] first thing in the morning if it has not already been sent. Fax number is [phone]. Best contact number for pt [phone]. Pt states he will stop by to pick it up, but also needs it faxed.

## 2017-11-17 ENCOUNTER — APPOINTMENT (OUTPATIENT)
Dept: INFUSION THERAPY | Age: 62
End: 2017-11-17
Payer: COMMERCIAL

## 2017-12-01 ENCOUNTER — APPOINTMENT (OUTPATIENT)
Dept: INFUSION THERAPY | Age: 62
End: 2017-12-01

## 2017-12-04 ENCOUNTER — HOSPITAL ENCOUNTER (OUTPATIENT)
Dept: INFUSION THERAPY | Age: 62
End: 2017-12-04
Payer: COMMERCIAL

## 2017-12-07 RX ORDER — LISINOPRIL 5 MG/1
TABLET ORAL
Qty: 360 TAB | Refills: 3 | Status: SHIPPED | OUTPATIENT
Start: 2017-12-07 | End: 2019-08-26 | Stop reason: ALTCHOICE

## 2017-12-18 ENCOUNTER — APPOINTMENT (OUTPATIENT)
Dept: INFUSION THERAPY | Age: 62
End: 2017-12-18
Payer: COMMERCIAL

## 2017-12-29 ENCOUNTER — APPOINTMENT (OUTPATIENT)
Dept: INFUSION THERAPY | Age: 62
End: 2017-12-29

## 2018-01-08 ENCOUNTER — TELEPHONE (OUTPATIENT)
Dept: ONCOLOGY | Age: 63
End: 2018-01-08

## 2018-01-08 ENCOUNTER — HOSPITAL ENCOUNTER (OUTPATIENT)
Dept: INFUSION THERAPY | Age: 63
Discharge: HOME | End: 2018-01-08
Payer: COMMERCIAL

## 2018-01-08 VITALS
RESPIRATION RATE: 18 BRPM | HEART RATE: 60 BPM | TEMPERATURE: 98.9 F | SYSTOLIC BLOOD PRESSURE: 134 MMHG | DIASTOLIC BLOOD PRESSURE: 78 MMHG

## 2018-01-08 LAB
BASOPHILS # BLD: 0 K/UL (ref 0–0.1)
BASOPHILS NFR BLD: 1 % (ref 0–1)
EOSINOPHIL # BLD: 0.2 K/UL (ref 0–0.4)
EOSINOPHIL NFR BLD: 3 % (ref 0–7)
ERYTHROCYTE [DISTWIDTH] IN BLOOD BY AUTOMATED COUNT: 15.7 % (ref 11.5–14.5)
HCT VFR BLD AUTO: 40.8 % (ref 36.6–50.3)
HGB BLD-MCNC: 12.8 G/DL (ref 12.1–17)
LYMPHOCYTES # BLD: 2 K/UL (ref 0.8–3.5)
LYMPHOCYTES NFR BLD: 28 % (ref 12–49)
MCH RBC QN AUTO: 22.3 PG (ref 26–34)
MCHC RBC AUTO-ENTMCNC: 31.4 G/DL (ref 30–36.5)
MCV RBC AUTO: 71.1 FL (ref 80–99)
MONOCYTES # BLD: 0.4 K/UL (ref 0–1)
MONOCYTES NFR BLD: 6 % (ref 5–13)
NEUTS SEG # BLD: 4.4 K/UL (ref 1.8–8)
NEUTS SEG NFR BLD: 62 % (ref 32–75)
PLATELET # BLD AUTO: 522 K/UL (ref 150–400)
RBC # BLD AUTO: 5.74 M/UL (ref 4.1–5.7)
WBC # BLD AUTO: 7.1 K/UL (ref 4.1–11.1)

## 2018-01-08 PROCEDURE — 36415 COLL VENOUS BLD VENIPUNCTURE: CPT | Performed by: INTERNAL MEDICINE

## 2018-01-08 PROCEDURE — 85025 COMPLETE CBC W/AUTO DIFF WBC: CPT | Performed by: INTERNAL MEDICINE

## 2018-01-08 NOTE — PROGRESS NOTES
[facility] VISIT NOTE    Pt arrived at [facility] ambulatory and in no distress for therapeutic Phlebotomoy. .  Assessment completed, pt c/o fatigue. Patient Vitals for the past 12 hrs:   Temp Pulse Resp BP   01/08/18 1521 98.9 °F (37.2 °C) 60 18 134/78     Labs drawn from right hand and sent. Recent Results (from the past 12 hour(s))   CBC WITH AUTOMATED DIFF    Collection Time: 01/08/18  3:30 PM   Result Value Ref Range    WBC 7.1 4.1 - 11.1 K/uL    RBC 5.74 (H) 4.10 - 5.70 M/uL    HGB 12.8 12.1 - 17.0 g/dL    HCT 40.8 36.6 - 50.3 %    MCV 71.1 (L) 80.0 - 99.0 FL    MCH 22.3 (L) 26.0 - 34.0 PG    MCHC 31.4 30.0 - 36.5 g/dL    RDW 15.7 (H) 11.5 - 14.5 %    PLATELET 982 (H) 863 - 400 K/uL    NEUTROPHILS 62 32 - 75 %    LYMPHOCYTES 28 12 - 49 %    MONOCYTES 6 5 - 13 %    EOSINOPHILS 3 0 - 7 %    BASOPHILS 1 0 - 1 %    ABS. NEUTROPHILS 4.4 1.8 - 8.0 K/UL    ABS. LYMPHOCYTES 2.0 0.8 - 3.5 K/UL    ABS. MONOCYTES 0.4 0.0 - 1.0 K/UL    ABS. EOSINOPHILS 0.2 0.0 - 0.4 K/UL    ABS. BASOPHILS 0.0 0.0 - 0.1 K/UL     Labs not within treatment parameters. D/C'd from [facility] ambulatory and in no distress accompanied by . Next appointment is .

## 2018-01-08 NOTE — PROGRESS NOTES
Problem: Knowledge Deficit  Goal: *Verbalizes understanding of procedures and medications  Outcome: Progressing Towards Goal  Pt here for therapeutic phlebotomy.

## 2018-01-22 ENCOUNTER — HOSPITAL ENCOUNTER (OUTPATIENT)
Dept: INFUSION THERAPY | Age: 63
Discharge: HOME | End: 2018-01-22
Payer: COMMERCIAL

## 2018-01-22 ENCOUNTER — TELEPHONE (OUTPATIENT)
Dept: ONCOLOGY | Age: 63
End: 2018-01-22

## 2018-01-22 VITALS
TEMPERATURE: 97.9 F | RESPIRATION RATE: 18 BRPM | SYSTOLIC BLOOD PRESSURE: 151 MMHG | HEART RATE: 67 BPM | DIASTOLIC BLOOD PRESSURE: 87 MMHG | OXYGEN SATURATION: 98 %

## 2018-01-22 LAB
BASOPHILS # BLD: 0.1 K/UL (ref 0–0.1)
BASOPHILS NFR BLD: 1 % (ref 0–1)
DIFFERENTIAL METHOD BLD: ABNORMAL
EOSINOPHIL # BLD: 0.2 K/UL (ref 0–0.4)
EOSINOPHIL NFR BLD: 3 % (ref 0–7)
ERYTHROCYTE [DISTWIDTH] IN BLOOD BY AUTOMATED COUNT: 15.7 % (ref 11.5–14.5)
HCT VFR BLD AUTO: 43.4 % (ref 36.6–50.3)
HGB BLD-MCNC: 13.6 G/DL (ref 12.1–17)
LYMPHOCYTES # BLD: 2.9 K/UL (ref 0.8–3.5)
LYMPHOCYTES NFR BLD: 40 % (ref 12–49)
MCH RBC QN AUTO: 21.9 PG (ref 26–34)
MCHC RBC AUTO-ENTMCNC: 31.3 G/DL (ref 30–36.5)
MCV RBC AUTO: 69.8 FL (ref 80–99)
MONOCYTES # BLD: 0.5 K/UL (ref 0–1)
MONOCYTES NFR BLD: 7 % (ref 5–13)
NEUTS SEG # BLD: 3.5 K/UL (ref 1.8–8)
NEUTS SEG NFR BLD: 49 % (ref 32–75)
PLATELET # BLD AUTO: 464 K/UL (ref 150–400)
RBC # BLD AUTO: 6.22 M/UL (ref 4.1–5.7)
RBC MORPH BLD: ABNORMAL
RBC MORPH BLD: ABNORMAL
WBC # BLD AUTO: 7.2 K/UL (ref 4.1–11.1)

## 2018-01-22 PROCEDURE — 36415 COLL VENOUS BLD VENIPUNCTURE: CPT | Performed by: INTERNAL MEDICINE

## 2018-01-22 PROCEDURE — 85025 COMPLETE CBC W/AUTO DIFF WBC: CPT | Performed by: INTERNAL MEDICINE

## 2018-01-22 NOTE — PROGRESS NOTES
[facility] Progress Note    Date: 2018    Name: Zee Sullivan    MRN: [id number]         : 1955    Mr. Toño Fernandez arrived at 1500 ambulatory and in no distress for Labs and Therapeutic Phlebotomy. Assessment was completed, no acute issues at this time, Patient stated that his Rosecea on face has gotten worse and he has been having the occasional nose bleed. Labs drawn from Left Hand without difficulty, labs drawn and sent. Dr. Viji Vargas notified that patient's order needed clarification. Patient is now on a every two week schedule and will have a CBC with diff drawn at every two weeks. New order will be scanned into the system. Mr. Ebony Reyna vitals were reviewed. Patient Vitals for the past 12 hrs:   Temp Pulse Resp BP SpO2   18 1500 97.9 °F (36.6 °C) 67 18 151/87 98 %       Lab results were obtained and reviewed. Recent Results (from the past 12 hour(s))   CBC WITH AUTOMATED DIFF    Collection Time: 18  3:21 PM   Result Value Ref Range    WBC 7.2 4.1 - 11.1 K/uL    RBC 6.22 (H) 4.10 - 5.70 M/uL    HGB 13.6 12.1 - 17.0 g/dL    HCT 43.4 36.6 - 50.3 %    MCV 69.8 (L) 80.0 - 99.0 FL    MCH 21.9 (L) 26.0 - 34.0 PG    MCHC 31.3 30.0 - 36.5 g/dL    RDW 15.7 (H) 11.5 - 14.5 %    PLATELET 566 (H) 593 - 400 K/uL    NEUTROPHILS PENDING %    LYMPHOCYTES PENDING %    MONOCYTES PENDING %    EOSINOPHILS PENDING %    BASOPHILS PENDING %    ABS. NEUTROPHILS PENDING K/UL    ABS. LYMPHOCYTES PENDING K/UL    ABS. MONOCYTES PENDING K/UL    ABS. EOSINOPHILS PENDING K/UL    ABS. BASOPHILS PENDING K/UL    DF PENDING      Some labs have not resulted at the time of this note. HCT 43.4 and outside parameters for treatment. Mr. Toño Fernandez  was discharged from [place] [address] in stable condition at 1600. He is to return on 2018 at 3:00pm for his next appointment.     Kelvin Willams  2018

## 2018-01-22 NOTE — PROGRESS NOTES
Problem: Knowledge Deficit  Goal: *Verbalizes understanding of procedures and medications  Outcome: Progressing Towards Goal  Patient here for Therapeutic Phlebotomy and Labs

## 2018-01-26 ENCOUNTER — APPOINTMENT (OUTPATIENT)
Dept: INFUSION THERAPY | Age: 63
End: 2018-01-26

## 2018-02-19 ENCOUNTER — APPOINTMENT (OUTPATIENT)
Dept: INFUSION THERAPY | Age: 63
End: 2018-02-19

## 2018-02-23 ENCOUNTER — APPOINTMENT (OUTPATIENT)
Dept: INFUSION THERAPY | Age: 63
End: 2018-02-23

## 2018-03-05 ENCOUNTER — APPOINTMENT (OUTPATIENT)
Dept: INFUSION THERAPY | Age: 63
End: 2018-03-05

## 2018-03-19 ENCOUNTER — APPOINTMENT (OUTPATIENT)
Dept: INFUSION THERAPY | Age: 63
End: 2018-03-19

## 2018-03-23 ENCOUNTER — APPOINTMENT (OUTPATIENT)
Dept: INFUSION THERAPY | Age: 63
End: 2018-03-23

## 2018-04-05 ENCOUNTER — TELEPHONE (OUTPATIENT)
Dept: ONCOLOGY | Age: 63
End: 2018-04-05

## 2018-04-05 NOTE — TELEPHONE ENCOUNTER
Whatever pt wants  Its hard for us when pt gets phlebotomy here and at [facility]

## 2018-04-05 NOTE — TELEPHONE ENCOUNTER
Pt needs a lab order sent to [facility], and would like a call back when done

## 2018-04-05 NOTE — TELEPHONE ENCOUNTER
Message left to return call. Need to clarify if patient is receiving care at [address] Was NO SHOW for 2/5/18 [facility] appt.

## 2018-04-05 NOTE — TELEPHONE ENCOUNTER
HIPAA verified. Reviewed [facility]. Stated had last phlebotomy at [place] 6 weeks ago. Stated now resuming care at [facility] as \"got all the insurance issues straightened out\". Would like standing order for CBC faxed to [facility] would forward to provider for review and notify patient.   Thanked for assist.

## 2018-04-09 ENCOUNTER — DOCUMENTATION ONLY (OUTPATIENT)
Dept: ONCOLOGY | Age: 63
End: 2018-04-09

## 2018-04-09 NOTE — PROGRESS NOTES
Standing order CBC faxed to [facility] on second attempt. To PSR for scanning see My Chart message.

## 2018-04-20 ENCOUNTER — APPOINTMENT (OUTPATIENT)
Dept: INFUSION THERAPY | Age: 63
End: 2018-04-20
Payer: COMMERCIAL

## 2018-05-02 ENCOUNTER — OFFICE VISIT (OUTPATIENT)
Dept: FAMILY MEDICINE CLINIC | Age: 63
End: 2018-05-02

## 2018-05-02 VITALS
DIASTOLIC BLOOD PRESSURE: 86 MMHG | SYSTOLIC BLOOD PRESSURE: 142 MMHG | HEART RATE: 63 BPM | WEIGHT: 201 LBS | HEIGHT: 70 IN | BODY MASS INDEX: 28.77 KG/M2 | TEMPERATURE: 98.1 F | OXYGEN SATURATION: 98 % | RESPIRATION RATE: 18 BRPM

## 2018-05-02 DIAGNOSIS — R25.2 LEG CRAMPS: Primary | ICD-10-CM

## 2018-05-02 DIAGNOSIS — I10 ESSENTIAL HYPERTENSION, BENIGN: ICD-10-CM

## 2018-05-02 DIAGNOSIS — F31.9 BIPOLAR 1 DISORDER (HCC): ICD-10-CM

## 2018-05-02 DIAGNOSIS — D45 POLYCYTHEMIA VERA (HCC): Chronic | ICD-10-CM

## 2018-05-02 RX ORDER — FLUTICASONE PROPIONATE 50 MCG
2 SPRAY, SUSPENSION (ML) NASAL
COMMUNITY

## 2018-05-02 RX ORDER — QUETIAPINE FUMARATE 25 MG/1
25 TABLET, FILM COATED ORAL 2 TIMES DAILY
Qty: 60 TAB | Refills: 1 | Status: SHIPPED | OUTPATIENT
Start: 2018-05-02 | End: 2019-08-26 | Stop reason: ALTCHOICE

## 2018-05-02 NOTE — MR AVS SNAPSHOT
[address] [address] 
[phone] Patient: Duncan Maldonado MRN: [id number] ZOP:[birth year] Visit Information Date & Time Provider Department Dept. Phone Encounter #  
 5/2/2018  2:10 PM Madalyn Yost MD [address] [phone] [id number] Follow-up Instructions Return if symptoms worsen or fail to improve. Your Appointments 6/12/2018  2:30 PM  
Follow Up with Wendy Pak  [facility] Oncology at [facility] Appt Note: 6 month f/u-Thrombocytopenia; r/s from 3/8  
 [address] [address]  
[phone]  
  
   
 [address] [facility] [address] Upcoming Health Maintenance Date Due Hepatitis C Screening 1955 DTaP/Tdap/Td series (1 - Tdap) 7/24/2004 Pneumococcal 19-64 Highest Risk (3 of 3 - PCV13) 1/12/2017 ZOSTER VACCINE AGE 60> 4/23/2017 MEDICARE YEARLY EXAM 3/28/2018 Influenza Age 5 to Adult 8/1/2018 COLONOSCOPY 1/19/2025 Allergies as of 5/2/2018  Review Complete On: 5/2/2018 By: Madalyn Yost MD  
  
 Severity Noted Reaction Type Reactions Sulfur High 06/15/2011   Systemic Anaphylaxis, Hives, Seizures Zyprexa [Olanzapine] High 07/23/2013    Anaphylaxis Celexa [Citalopram]  07/23/2013    Other (comments) groggy Clindamycin  07/23/2013    Nausea and Vomiting Lisinopril  02/23/2015    Other (comments) Prozac [Fluoxetine]  07/23/2013    Anxiety Risperidone  07/23/2013    Anxiety Current Immunizations  Reviewed on 1/22/2018 Name Date Influenza High Dose Vaccine PF 11/12/2015 Influenza Vaccine 10/23/2012 Influenza Vaccine Niurka Mikie) 10/13/2016 Influenza Vaccine PF 9/30/2013 Pneumococcal Polysaccharide (PPSV-23) 1/12/2016 Pneumococcal Vaccine (Unspecified Type) 7/23/2012 Td 7/23/2004 Not reviewed this visit You Were Diagnosed With   
  
 Codes Comments Leg cramps    -  Primary ICD-10-CM: R25.2 ICD-9-CM: 729.82 Polycythemia vera ([facility] [address]     ICD-10-CM: O59 ICD-9-CM: 238.4 Essential hypertension, benign     ICD-10-CM: I10 
ICD-9-CM: 401.1 Bipolar 1 disorder (HCC)     ICD-10-CM: F31.9 ICD-9-CM: 296.7 Vitals BP Pulse Temp Resp Height(growth percentile) Weight(growth percentile) 142/86 63 98.1 °F (36.7 °C) 18 5' 10\" (1.778 m) 201 lb (91.2 kg) SpO2 BMI Smoking Status 98% 28.84 kg/m2 Current Every Day Smoker Vitals History BMI and BSA Data Body Mass Index Body Surface Area  
 28.84 kg/m 2 2.12 m 2 Preferred Pharmacy Pharmacy Name Phone [facility] [address] [place] [address] [phone] Your Updated Medication List  
  
   
This list is accurate as of 5/2/18  2:44 PM.  Always use your most recent med list.  
  
  
  
  
 FLONASE ALLERGY RELIEF 50 mcg/actuation nasal spray Generic drug:  fluticasone 2 Sprays by Both Nostrils route daily. lisinopril 5 mg tablet Commonly known as:  PRINIVIL, ZESTRIL  
TAKE 1 TO 4 TABLETS BY MOUTH DAILY FOR BLOOD PRESSURE READINGS AS DIRECTED QUEtiapine 25 mg tablet Commonly known as:  SEROquel Take 1 Tab by mouth two (2) times a day. Indications: DEPRESSION ASSOCIATED WITH BIPOLAR DISORDER  
  
 tamsulosin 0.4 mg capsule Commonly known as:  FLOMAX Take 1 Cap by mouth daily. traMADol 50 mg tablet Commonly known as:  ULTRAM  
TAKE 1 TABLET BY MOUTH EVERY 6 HOURS AS NEEDED FOR PAIN. MAX 200MG IN 1 DAY. valACYclovir 500 mg tablet Commonly known as:  VALTREX TK 4 TS PO IN PRODROME AND 4 TS PO 12 H LATER Prescriptions Sent to Pharmacy Refills QUEtiapine (SEROQUEL) 25 mg tablet 1 Sig: Take 1 Tab by mouth two (2) times a day. Indications: DEPRESSION ASSOCIATED WITH BIPOLAR DISORDER  Class: Normal  
 Pharmacy: [facility] [address] [place], [place] [address]y [address] [place] [address] #: [phone] Route: Oral  
  
We Performed the Following CBC WITH AUTOMATED DIFF [18608 CPT(R)] METABOLIC PANEL, COMPREHENSIVE [89731 CPT(R)] TSH 3RD GENERATION [33302 CPT(R)] Follow-up Instructions Return if symptoms worsen or fail to improve. Introducing [facility]! Dear Felisha Armas: Thank you for requesting a [employer] account. Our records indicate that you already have an active [employer] account. You can access your account anytime at [email or web address]. [email or web address] Did you know that you can access your hospital and ER discharge instructions at any time in [employer]? You can also review all of your test results from your hospital stay or ER visit. Additional Information If you have questions, please visit the Frequently Asked Questions section of the [employer] website at [email or web address] Remember, [employer] is NOT to be used for urgent needs. For medical emergencies, dial 911. Now available from your iPhone and Android! Please provide this summary of care documentation to your next provider. Your primary care clinician is listed as TIMUR LANDAVERDE. If you have any questions after today's visit, please call [phone].

## 2018-05-02 NOTE — PROGRESS NOTES
Chief Complaint   Patient presents with    Head Pain     head numbness, pv related, migrains, visual changes since Monday    Leg Pain     leg and foot cramps getting worse     1. Have you been to the ER, urgent care clinic since your last visit? Hospitalized since your last visit? No    2. Have you seen or consulted any other health care providers outside of the [address] [place] [address] since your last visit? Include any pap smears or colon screening. No       Chief Complaint   Patient presents with    Head Pain     head numbness, pv related, migrains, visual changes since Monday    Leg Pain     leg and foot cramps getting worse    Labs     lithium levels     He is a 63 y.o. male who presents for evalution. Reviewed PmHx, RxHx, FmHx, SocHx, AllgHx and updated and dated in the chart. Patient Active Problem List    Diagnosis    Bipolar 1 disorder ([facility] [address]    Benign prostatic hyperplasia    Chronic nonintractable headache    Anxiety and depression    Vertigo    Gallstones    Ringing in ears    Fatigue    Polycythemia vera ([facility] [address]    Mixed hyperlipidemia    LAINE (obstructive sleep apnea)    Essential hypertension, benign    Depression       Review of Systems - negative except as listed above in the HPI    Objective:     Vitals:    05/02/18 1422   BP: 142/86   Pulse: 63   Resp: 18   Temp: 98.1 °F (36.7 °C)   SpO2: 98%   Weight: 201 lb (91.2 kg)   Height: 5' 10\" (1.778 m)     Physical Examination: General appearance - alert, well appearing, and in no distress  Neck - supple, no significant adenopathy  Chest - clear to auscultation, no wheezes, rales or rhonchi, symmetric air entry  Heart - normal rate, regular rhythm, normal S1, S2, no murmurs, rubs, clicks or gallops  Abdomen - soft, nontender, nondistended, no masses or organomegaly  Extremities - peripheral pulses normal, no pedal edema, no clubbing or cyanosis    Assessment/ Plan:   Diagnoses and all orders for this visit:    1.  Leg cramps  - CBC WITH AUTOMATED DIFF  -     METABOLIC PANEL, COMPREHENSIVE  -     TSH 3RD GENERATION  -wants to see neuro to r/o MS    2. Polycythemia vera ([address]  -     CBC WITH AUTOMATED DIFF    3. Essential hypertension, benign  -     METABOLIC PANEL, COMPREHENSIVE  -at goal at home    4. Bipolar 1 disorder (HCC)  -     QUEtiapine (SEROQUEL) 25 mg tablet; Take 1 Tab by mouth two (2) times a day. Indications: DEPRESSION ASSOCIATED WITH BIPOLAR DISORDER  -add rx       Follow-up Disposition:  Return if symptoms worsen or fail to improve. I have discussed the diagnosis with the patient and the intended plan as seen in the above orders. The patient understands and agrees with the plan. The patient has received an after-visit summary and questions were answered concerning future plans. Medication Side Effects and Warnings were discussed with patient  Patient Labs were reviewed and or requested:  Patient Past Records were reviewed and or requested    Antoinette Li M.D. There are no Patient Instructions on file for this visit.

## 2018-05-03 LAB
ALBUMIN SERPL-MCNC: 4.3 G/DL (ref 3.6–4.8)
ALBUMIN/GLOB SERPL: 1.6 {RATIO} (ref 1.2–2.2)
ALP SERPL-CCNC: 75 IU/L (ref 39–117)
ALT SERPL-CCNC: 14 IU/L (ref 0–44)
AST SERPL-CCNC: 10 IU/L (ref 0–40)
BASOPHILS # BLD AUTO: 0.1 X10E3/UL (ref 0–0.2)
BASOPHILS NFR BLD AUTO: 1 %
BILIRUB SERPL-MCNC: 0.3 MG/DL (ref 0–1.2)
BUN SERPL-MCNC: 12 MG/DL (ref 8–27)
BUN/CREAT SERPL: 13 (ref 10–24)
CALCIUM SERPL-MCNC: 9.3 MG/DL (ref 8.6–10.2)
CHLORIDE SERPL-SCNC: 103 MMOL/L (ref 96–106)
CO2 SERPL-SCNC: 24 MMOL/L (ref 18–29)
CREAT SERPL-MCNC: 0.93 MG/DL (ref 0.76–1.27)
EOSINOPHIL # BLD AUTO: 0.3 X10E3/UL (ref 0–0.4)
EOSINOPHIL NFR BLD AUTO: 3 %
ERYTHROCYTE [DISTWIDTH] IN BLOOD BY AUTOMATED COUNT: 17.9 % (ref 12.3–15.4)
GFR SERPLBLD CREATININE-BSD FMLA CKD-EPI: 103 ML/MIN/1.73
GFR SERPLBLD CREATININE-BSD FMLA CKD-EPI: 89 ML/MIN/1.73
GLOBULIN SER CALC-MCNC: 2.7 G/DL (ref 1.5–4.5)
GLUCOSE SERPL-MCNC: 112 MG/DL (ref 65–99)
HCT VFR BLD AUTO: 42.6 % (ref 37.5–51)
HGB BLD-MCNC: 12.5 G/DL (ref 13–17.7)
IMM GRANULOCYTES # BLD: 0 X10E3/UL (ref 0–0.1)
IMM GRANULOCYTES NFR BLD: 0 %
LYMPHOCYTES # BLD AUTO: 2.8 X10E3/UL (ref 0.7–3.1)
LYMPHOCYTES NFR BLD AUTO: 33 %
MCH RBC QN AUTO: 20.4 PG (ref 26.6–33)
MCHC RBC AUTO-ENTMCNC: 29.3 G/DL (ref 31.5–35.7)
MCV RBC AUTO: 69 FL (ref 79–97)
MONOCYTES # BLD AUTO: 0.6 X10E3/UL (ref 0.1–0.9)
MONOCYTES NFR BLD AUTO: 7 %
NEUTROPHILS # BLD AUTO: 4.7 X10E3/UL (ref 1.4–7)
NEUTROPHILS NFR BLD AUTO: 56 %
PLATELET # BLD AUTO: 570 X10E3/UL (ref 150–379)
POTASSIUM SERPL-SCNC: 5 MMOL/L (ref 3.5–5.2)
PROT SERPL-MCNC: 7 G/DL (ref 6–8.5)
RBC # BLD AUTO: 6.14 X10E6/UL (ref 4.14–5.8)
SODIUM SERPL-SCNC: 144 MMOL/L (ref 134–144)
TSH SERPL DL<=0.005 MIU/L-ACNC: 2.34 UIU/ML (ref 0.45–4.5)
WBC # BLD AUTO: 8.4 X10E3/UL (ref 3.4–10.8)

## 2018-05-14 ENCOUNTER — OFFICE VISIT (OUTPATIENT)
Dept: NEUROLOGY | Age: 63
End: 2018-05-14

## 2018-05-14 VITALS
RESPIRATION RATE: 18 BRPM | BODY MASS INDEX: 28.77 KG/M2 | DIASTOLIC BLOOD PRESSURE: 82 MMHG | WEIGHT: 201 LBS | SYSTOLIC BLOOD PRESSURE: 138 MMHG | HEIGHT: 70 IN

## 2018-05-14 DIAGNOSIS — R25.2 MUSCLE CRAMP, NOCTURNAL: ICD-10-CM

## 2018-05-14 DIAGNOSIS — R51.9 PRESSURE IN HEAD: ICD-10-CM

## 2018-05-14 DIAGNOSIS — M47.812 CERVICAL SPINE ARTHRITIS: ICD-10-CM

## 2018-05-14 DIAGNOSIS — R35.0 URINARY FREQUENCY: ICD-10-CM

## 2018-05-14 DIAGNOSIS — R20.0 NUMBNESS: Primary | ICD-10-CM

## 2018-05-14 DIAGNOSIS — R20.0 NUMBNESS: ICD-10-CM

## 2018-05-14 NOTE — MR AVS SNAPSHOT
[address] [address] [place] [address] 
[phone] Patient: Elo Harris MRN: [id number] Visit Information Date & Time Provider Department Dept. Phone Encounter #  
 5/14/2018  8:00 AM Ifeoma eDvries  [facility] [phone] [id number] Follow-up Instructions Return for call to schedule for results. Your Appointments 6/4/2018  8:30 AM  
PROCEDURE with EEG SCHEDULE 1991 [facility] ([address] [facility]) Appt Note: L arm and leg [place] [address] [facility] [address] [phone]  
  
   
 [place] [address] [address] [address] 6/12/2018  2:30 PM  
Follow Up with DO Roula Sullivan [facility] Appt Note: 6 month f/u-Thrombocytopenia; r/s from 3/8  
 [address] [id number]  
[phone]  
  
   
 [address] [facility] [address] Upcoming Health Maintenance Date Due Hepatitis C Screening 1955 DTaP/Tdap/Td series (1 - Tdap) 7/24/2004 Pneumococcal 19-64 Highest Risk (3 of 3 - PCV13) 1/12/2017 ZOSTER VACCINE AGE 60> 4/23/2017 MEDICARE YEARLY EXAM 3/28/2018 Influenza Age 5 to Adult 8/1/2018 COLONOSCOPY 1/19/2025 Allergies as of 5/14/2018  Review Complete On: 5/2/2018 By: Av Toribio MD  
  
 Severity Noted Reaction Type Reactions Sulfur High 06/15/2011   Systemic Anaphylaxis, Hives, Seizures Zyprexa [Olanzapine] High 07/23/2013    Anaphylaxis Celexa [Citalopram]  07/23/2013    Other (comments) groggy Clindamycin  07/23/2013    Nausea and Vomiting Lisinopril  02/23/2015    Other (comments) Prozac [Fluoxetine]  07/23/2013    Anxiety Risperidone  07/23/2013    Anxiety Current Immunizations  Reviewed on 1/22/2018 Name Date Influenza High Dose Vaccine PF 11/12/2015 Influenza Vaccine 10/23/2012 Influenza Vaccine Kenan Perez) 10/13/2016 Influenza Vaccine PF 9/30/2013 Pneumococcal Polysaccharide (PPSV-23) 1/12/2016 Pneumococcal Vaccine (Unspecified Type) 7/23/2012 Td 7/23/2004 Not reviewed this visit You Were Diagnosed With   
  
 Codes Comments Numbness    -  Primary ICD-10-CM: R20.0 ICD-9-CM: 782.0 Pressure in head     ICD-10-CM: R51 ICD-9-CM: 784.0 Urinary frequency     ICD-10-CM: R35.0 ICD-9-CM: 788.41 Muscle cramp, nocturnal     ICD-10-CM: R25.2 ICD-9-CM: 729.82 Cervical spine arthritis (HCC)     ICD-10-CM: M46.92 
ICD-9-CM: 721.0 Vitals BP Resp Height(growth percentile) Weight(growth percentile) BMI Smoking Status 138/82 18 5' 10\" (1.778 m) 201 lb (91.2 kg) 28.84 kg/m2 Current Every Day Smoker BMI and BSA Data Body Mass Index Body Surface Area  
 28.84 kg/m 2 2.12 m 2 Preferred Pharmacy Pharmacy Name Phone [facility] [address] [place] [address] [phone] Your Updated Medication List  
  
   
This list is accurate as of 5/14/18  8:56 AM.  Always use your most recent med list.  
  
  
  
  
 FLONASE ALLERGY RELIEF 50 mcg/actuation nasal spray Generic drug:  fluticasone 2 Sprays by Both Nostrils route daily. lisinopril 5 mg tablet Commonly known as:  PRINIVIL, ZESTRIL  
TAKE 1 TO 4 TABLETS BY MOUTH DAILY FOR BLOOD PRESSURE READINGS AS DIRECTED QUEtiapine 25 mg tablet Commonly known as:  SEROquel Take 1 Tab by mouth two (2) times a day. Indications: DEPRESSION ASSOCIATED WITH BIPOLAR DISORDER  
  
 tamsulosin 0.4 mg capsule Commonly known as:  FLOMAX Take 1 Cap by mouth daily. traMADol 50 mg tablet Commonly known as:  ULTRAM  
TAKE 1 TABLET BY MOUTH EVERY 6 HOURS AS NEEDED FOR PAIN. MAX 200MG IN 1 DAY. valACYclovir 500 mg tablet Commonly known as: Guerita Gray TK 4 TS PO IN PRODROME AND 4 TS PO 12 H LATER We Performed the Following ALDOLASE [id number] CPT(R)] CK F5662997 CPT(R)] CRP, HIGH SENSITIVITY [63116 CPT(R)]   
 LD [30427 CPT(R)] PROTEIN ELECTROPHORESIS [78610 CPT(R)] Follow-up Instructions Return for call to schedule for results. To-Do List   
 05/14/2018 Lab:  SED RATE (ESR)   
  
 05/15/2018 Neurology:  EMG LIMITED   
  
 05/15/2018 Imaging:  MRI BRAIN W WO CONT   
  
 05/15/2018 Imaging:  MRI CERV SPINE W WO CONT Patient Instructions Zeinab Gonzalez 3812 What is a living will? A living will is a legal form you use to write down the kind of care you want at the end of your life. It is used by the health professionals who will treat you if you aren't able to decide for yourself. If you put your wishes in writing, your loved ones and others will know what kind of care you want. They won't need to guess. This can ease your mind and be helpful to others. A living will is not the same as an estate or property will. An estate will explains what you want to happen with your money and property after you die. Is a living will a legal document? A living will is a legal document. Each state has its own laws about living rub[place]. If you move to another state, make sure that your living will is legal in the state where you now live. Or you might use a universal form that has been approved by many states. This kind of form can sometimes be completed and stored online. Your electronic copy will then be available wherever you have a connection to the Internet. In most cases, doctors will respect your wishes even if you have a form from a different state. · You don't need an  to complete a living will. But legal advice can be helpful if your state's laws are unclear, your health history is complicated, or your family can't agree on what should be in your living will.  
· You can change your living will at any time. Some people find that their wishes about end-of-life care change as their health changes. · In addition to making a living will, think about completing a medical power of  form. This form lets you name the person you want to make end-of-life treatment decisions for you (your \"health care agent\") if you're not able to. Many hospitals and nursing homes will give you the forms you need to complete a living will and a medical power of . · Your living will is used only if you can't make or communicate decisions for yourself anymore. If you become able to make decisions again, you can accept or refuse any treatment, no matter what you wrote in your living will. · Your state may offer an online registry. This is a place where you can store your living will online so the doctors and nurses who need to treat you can find it right away. What should you think about when creating a living will? Talk about your end-of-life wishes with your family members and your doctor. Let them know what you want. That way the people making decisions for you won't be surprised by your choices. Think about these questions as you make your living will: · Do you know enough about life support methods that might be used? If not, talk to your doctor so you know what might be done if you can't breathe on your own, your heart stops, or you're unable to swallow. · What things would you still want to be able to do after you receive life-support methods? Would you want to be able to walk? To speak? To eat on your own? To live without the help of machines? · If you have a choice, where do you want to be cared for? In your home? At a hospital or nursing home? · Do you want certain Jain practices performed if you become very ill? · If you have a choice at the end of your life, where would you prefer to die? At home? In a hospital or nursing home? Somewhere else?  
· Would you prefer to be buried or cremated? · Do you want your organs to be donated after you die? What should you do with your living will? · Make sure that your family members and your health care agent have copies of your living will. · Give your doctor a copy of your living will to keep in your medical record. If you have more than one doctor, make sure that each one has a copy. · You may want to put a copy of your living will where it can be easily found. Where can you learn more? Go to http://marino-francisco j.info/. Enter G555 in the search box to learn more about \"Learning About Living [place]. \" Current as of: September 24, 2016 Content Version: 11.4 © 1796-9046 giddy. Care instructions adapted under license by [employer] (which disclaims liability or warranty for this information). If you have questions about a medical condition or this instruction, always ask your healthcare professional. [place] [address] any warranty or liability for your use of this information. Advance Directives: Care Instructions Your Care Instructions An advance directive is a legal way to state your wishes at the end of your life. It tells your family and your doctor what to do if you can no longer say what you want. There are two main types of advance directives. You can change them any time that your wishes change. · A living will tells your family and your doctor your wishes about life support and other treatment. · A durable power of  for health care lets you name a person to make treatment decisions for you when you can't speak for yourself. This person is called a health care agent. If you do not have an advance directive, decisions about your medical care may be made by a doctor or a  who doesn't know you. It may help to think of an advance directive as a gift to the people who care for you.  If you have one, they won't have to make tough decisions by themselves. Follow-up care is a key part of your treatment and safety. Be sure to make and go to all appointments, and call your doctor if you are having problems. It's also a good idea to know your test results and keep a list of the medicines you take. How can you care for yourself at home? · Discuss your wishes with your loved ones and your doctor. This way, there are no surprises. · Many states have a unique form. Or you might use a universal form that has been approved by many states. This kind of form can sometimes be completed and stored online. Your electronic copy will then be available wherever you have a connection to the Internet. In most cases, doctors will respect your wishes even if you have a form from a different state. · You don't need a  to do an advance directive. But you may want to get legal advice. · Think about these questions when you prepare an advance directive: ¨ Who do you want to make decisions about your medical care if you are not able to? Many people choose a family member or close friend. ¨ Do you know enough about life support methods that might be used? If not, talk to your doctor so you understand. ¨ What are you most afraid of that might happen? You might be afraid of having pain, losing your independence, or being kept alive by machines. ¨ Where would you prefer to die? Choices include your home, a hospital, or a nursing home. ¨ Would you like to have information about hospice care to support you and your family? ¨ Do you want to donate organs when you die? ¨ Do you want certain Mu-ism practices performed before you die? If so, put your wishes in the advance directive. · Read your advance directive every year, and make changes as needed. When should you call for help? Be sure to contact your doctor if you have any questions. Where can you learn more? Go to http://marino-francisco j.info/.  
Enter R264 in the search box to learn more about \"Advance Directives: Care Instructions. \" Current as of: September 24, 2016 Content Version: 11.4 © 5108-8121 Healthwise[employer]. Care instructions adapted under license by [employer] (which disclaims liability or warranty for this information). If you have questions about a medical condition or this instruction, always ask your healthcare professional. [address] any warranty or liability for your use of this information. PRESCRIPTION REFILL POLICY [facility] Statement to Patients April 1, 2014 In an effort to ensure the large volume of patient prescription refills is processed in the most efficient and expeditious manner, we are asking our patients to assist us by calling your Pharmacy for all prescription refills, this will include also your  Mail Order Pharmacy. The pharmacy will contact our office electronically to continue the refill process. Please do not wait until the last minute to call your pharmacy. We need at least 48 hours (2days) to fill prescriptions. We also encourage you to call your pharmacy before going to  your prescription to make sure it is ready. With regard to controlled substance prescription refill requests (narcotic refills) that need to be picked up at our office, we ask your cooperation by providing us with at least 72 hours (3days) notice that you will need a refill. We will not refill narcotic prescription refill requests after 4:00pm on any weekday, Monday through Thursday, or after 2:00pm on Fridays, or on the weekends. We encourage everyone to explore another way of getting your prescription refill request processed using [employer], our patient web portal through our electronic medical record system. [employer] is an efficient and effective way to communicate your medication request directly to the office and  downloadable as an ervin on your smart phone .  [employer] also features a review functionality that allows you to view your medication list as well as leave messages for your physician. Are you ready to get connected? If so please review the attatched instructions or speak to any of our staff to get you set up right away! Thank you so much for your cooperation. Should you have any questions please contact our Practice Administrator. The Physicians and Staff,  [facility] Patient Instruction Plan/ Result Policy If we have ordered testing for you, know that; \"NO NEWS IS GOOD NEWS! \" It is our policy that we know longer call patients with results, nor do we  give test results over the phone. We schedule follow up appointments so that your results can be discussed in person. This allows you to address any questions you have regarding the results. If something of concern is revealed on your test, we will contact you to discuss the matter and if needed schedule a sooner follow up appointment. Additionally, results may be found by using the My Chart feature and one of our patient service representatives at the  can give you instructions on how to access this feature to utilize our electronic medical record system. Thank you for your understanding. Introducing [facility]! Dear Aileen Llanes: Thank you for requesting a [facility] account. Our records indicate that you already have an active [facility] account. You can access your account anytime at [email or web address] Did you know that you can access your hospital and ER discharge instructions at any time in [facility]? You can also review all of your test results from your hospital stay or ER visit. Additional Information If you have questions, please visit the Frequently Asked Questions section of the [facility] website at [email or web address] Remember, [facility] is NOT to be used for urgent needs. For medical emergencies, dial 911.  
 
Now available from your iPhone and Android! Please provide this summary of care documentation to your next provider. Your primary care clinician is listed as TIMUR LANDAVERDE. If you have any questions after today's visit, please call [phone].

## 2018-05-14 NOTE — LETTER
Dear Benjy Bonilla MD, Thank you for allowing me to see your patient, José Miguel Fortune for a neurological consultation. Please see my impression and recommendations as outlined in my note. Sincerely, George Newman MD 
[facility] at [facility] Complaint Patient presents with  Spasms  
  thinks he may have MS; feet cramping 1. Have you been to the ER, urgent care clinic since your last visit? Hospitalized since your last visit? No 
 
2. Have you seen or consulted any other health care providers outside of the [address] [place] [address] since your last visit? Include any pap smears or colon screening. No  
 
Reviewed record in preparation for visit and have necessary documentation Pt did not bring medication to office visit for review Medication list reviewed and reconciled with patient Information was given to pt on Advanced Directives, Living Will 
opportunity was given for questions NEUROLOGY NEW PATIENT CONSULTATION 
 
REFERRED BY: 
Benjy Bonilla MD 
 
CHIEF COMPLAINT: 
Eval for MS 
 
HISTORY OF PRESENT ILLNESS HISTORY PROVIDED BY: 
Patient José Miguel Fortune is a 63 y.o. male who I am asked to see in consultation for evaluation for MS. Patient reports he has been concerned about this for quite some time. He has a history of being in the [employer] and exposure to . Chemicals that are similar to agent orange. 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  He is not sure completely what exposures he had at that time. His main symptoms are urinary frequency, numbness/pressure in the head, and calf cramping/twitching. Patient's urinary frequency has been issue for quite some time. He will also have Incomplete emptying of his bladder.   He may have a 24-hour period with this an issue and then he goes to the bathroom with no difficulty for several weeks at a time. He is on tamsulosin to help with this. He reports he was diagnosed with nocturia and based on this he was concerned for possible MS. Patient's numbness in his head has been ongoing since the 80s. He feels like a rubber band pressure sensation around his head. He has had associated left eye pain at times. This can be episodic as well and it increases his blood pressure. He was seen by neurology at some point for this. He is not currently on treatment for it. He also has a diagnosis of polycythemia. He gets phlebotomy secondary to this. He feels like this does help with the head pressure and jaw pain. Patient also has some calf cramping and twitching of the muscles. This is been ongoing for about 8 years. He wears longjohns at night and uses a heating blanket to help him. They have not found a source for this. Dopplers have been negative. He does have a history of peripheral vascular disease. He does get numbness in his feet and the filter falling asleep. He has had an EMG/NCS in the past that was told was normal, but patient feels like Catherine Villa was concerned that something was abnormal but did not tell him. He does have arthritis in his C-spine at C6 and 7 and some left shoulder pain secondary to this. He has never had any neuroimaging done. He does have vitamin D deficiency and is on supplements for this. [facility] Past Medical History:  
Diagnosis Date  Depression  GERD (gastroesophageal reflux disease)  Hypertension  Liver disease 1984 Fatty liver  Other ill-defined conditions(799.89)   
 gallstones  Polycythemia vera(238.4) 4/29/2013  Prostatitis  Sleep apnea 12/2011  
 doesn't use c-pap;  lost 25 lbs and has improved  Vertigo [facility] Social History Social History  Marital status:  Spouse name: N/A  
 Number of children: N/A  
 Years of education: N/A Social History Main Topics  Smoking status: Current Every Day Smoker Packs/day: 1.00 Years: 40.00  Smokeless tobacco: Never Used  Alcohol use No  
 Drug use: Yes Special: Marijuana Comment: rare  Sexual activity: No  
 
Other Topics Concern  Not on file Social History Narrative Bernardino Norman Family History Problem Relation Age of Onset  Cancer Father  Heart Disease Father  Heart Disease Mother  Kidney Disease Mother  Diabetes Brother ALLERGIES Allergies Allergen Reactions  Sulfur Anaphylaxis, Hives and Seizures  Zyprexa [Olanzapine] Anaphylaxis  Celexa [Citalopram] Other (comments) groggy  Clindamycin Nausea and Vomiting  Lisinopril Other (comments)  Prozac [Fluoxetine] Anxiety  Risperidone Anxiety CURRENT MEDS Current Outpatient Prescriptions Medication Sig Dispense Refill  fluticasone (FLONASE ALLERGY RELIEF) 50 mcg/actuation nasal spray 2 Sprays by Both Nostrils route daily.  lisinopril (PRINIVIL, ZESTRIL) 5 mg tablet TAKE 1 TO 4 TABLETS BY MOUTH DAILY FOR BLOOD PRESSURE READINGS AS DIRECTED 360 Tab 3  
 tamsulosin (FLOMAX) 0.4 mg capsule Take 1 Cap by mouth daily. 90 Cap 3  
 traMADol (ULTRAM) 50 mg tablet TAKE 1 TABLET BY MOUTH EVERY 6 HOURS AS NEEDED FOR PAIN. MAX 200MG IN 1 DAY. 30 Tab 0  
 QUEtiapine (SEROQUEL) 25 mg tablet Take 1 Tab by mouth two (2) times a day. Indications: DEPRESSION ASSOCIATED WITH BIPOLAR DISORDER 60 Tab 1  valACYclovir (VALTREX) 500 mg tablet TK 4 TS PO IN PRODROME AND 4 TS PO 12 H LATER  0  
 
 
REVIEW OF SYSTEMS:  
 
Y  N       Y  N  Y  N   Y  N 
[] [x] AIDS          [] [x] Falls  [] [x] Memory Loss  [] [x]  Shortness of breath [x] [] Anxiety          [x] [x] Fatigue [x] [] Muscle Pain        [] [x]  Skipped beats [x] [] Chest Pain   [x] [] Frequent HA [x] [] Ms Weakness     [x] []  Snoring 
[x] [] Constipation [x] []Hearing loss [] [x] Nause/Vomiting  [x] []  Stomach Pain 
[] [x] Cough          [] [x]Hepatitis [] [x] Neuropathy         [x] []  Swallowing difficulty 
[x] [] Depression  [x] [x]Incontinence [] [x] Poor appetite      [x] []  Vertigo 
[x] [] Diarrhea       [x] [] Joint Pain [x] [] Rash                   [x] []  Visual disturbances [x] [] Fainting        [] [x] Leg Swelling [x] [] Ringing ears       [] [x]  Weight changes []Unable to obtain  ROS due to  []mental status change  []sedated   []intubated PREVIOUS WORKUP IMAGING: None LABS Results for orders placed or performed in visit on 05/02/18 CBC WITH AUTOMATED DIFF Result Value Ref Range WBC 8.4 3.4 - 10.8 x10E3/uL  
 RBC 6.14 (H) 4.14 - 5.80 x10E6/uL HGB 12.5 (L) 13.0 - 17.7 g/dL HCT 42.6 37.5 - 51.0 % MCV 69 (L) 79 - 97 fL  
 MCH 20.4 (L) 26.6 - 33.0 pg  
 MCHC 29.3 (L) 31.5 - 35.7 g/dL  
 RDW 17.9 (H) 12.3 - 15.4 % PLATELET 059 (H) 351 - 379 x10E3/uL NEUTROPHILS 56 Not Estab. % Lymphocytes 33 Not Estab. % MONOCYTES 7 Not Estab. % EOSINOPHILS 3 Not Estab. % BASOPHILS 1 Not Estab. %  
 ABS. NEUTROPHILS 4.7 1.4 - 7.0 x10E3/uL Abs Lymphocytes 2.8 0.7 - 3.1 x10E3/uL  
 ABS. MONOCYTES 0.6 0.1 - 0.9 x10E3/uL  
 ABS. EOSINOPHILS 0.3 0.0 - 0.4 x10E3/uL  
 ABS. BASOPHILS 0.1 0.0 - 0.2 x10E3/uL IMMATURE GRANULOCYTES 0 Not Estab. %  
 ABS. IMM. GRANS. 0.0 0.0 - 0.1 x10E3/uL METABOLIC PANEL, COMPREHENSIVE Result Value Ref Range Glucose 112 (H) 65 - 99 mg/dL BUN 12 8 - 27 mg/dL Creatinine 0.93 0.76 - 1.27 mg/dL GFR est non-AA 89 >59 mL/min/1.73 GFR est  >59 mL/min/1.73  
 BUN/Creatinine ratio 13 10 - 24 Sodium 144 134 - 144 mmol/L Potassium 5.0 3.5 - 5.2 mmol/L Chloride 103 96 - 106 mmol/L  
 CO2 24 18 - 29 mmol/L Calcium 9.3 8.6 - 10.2 mg/dL Protein, total 7.0 6.0 - 8.5 g/dL Albumin 4.3 3.6 - 4.8 g/dL GLOBULIN, TOTAL 2.7 1.5 - 4.5 g/dL A-G Ratio 1.6 1.2 - 2.2 Bilirubin, total 0.3 0.0 - 1.2 mg/dL Alk.  phosphatase 75 39 - 117 IU/L  
 AST (SGOT) 10 0 - 40 IU/L ALT (SGPT) 14 0 - 44 IU/L  
TSH 3RD GENERATION Result Value Ref Range TSH 2.340 0.450 - 4.500 uIU/mL PHYSICAL EXAM 
Visit Vitals  /82  Resp 18  Ht 5' 10\" (1.778 m)  Wt 91.2 kg (201 lb)  BMI 28.84 kg/m2 General:  Alert, cooperative, no distress. Head:  Normocephalic, without obvious abnormality, atraumatic. Eyes:  Conjunctivae/corneas clear. Pupils equal, round, reactive to light. Extraocular movements intact, VFF, NO papilledema Lungs: 
Heart:   Non labored breathing Regular rate and rhythm, no carotid bruits Abdomen:   Soft, non-distended Extremities: Extremities normal, atraumatic, no cyanosis or edema. Pulses: 2+ and symmetric all extremities. Skin: Skin color, texture, turgor normal. No rashes or lesions. Neurologic:  Gen: Attention normal 
           Language: naming, repetition, fluency normal 
           Memory: intact recent and remote memory Cranial Nerves: 
I: smell Not tested II: visual fields Full to confrontation II: pupils Equal, round, reactive to light II: optic disc No papilledema III,VII: ptosis none III,IV,VI: extraocular muscles  Full ROM V: mastication normal  
V: facial light touch sensation  normal  
VII: facial muscle function   symmetric VIII: hearing symmetric IX: soft palate elevation  normal  
XI: trapezius strength  5/5 XI: sternocleidomastoid strength 5/5 XI: neck flexion strength  5/5 XII: tongue  midline Motor: normal bulk and tone, no tremor Strength: 5/5 all four extremities Sensory: intact to LT, PP, vibration, and temperature Coordination: FTN intact, Rhomberg negative Gait: normal gait including tandem Reflexes: 2+ throughout IMPRESSION Aixa Silvestre is a 63 y.o. male who presents for evaluation of possible MS. Patient has symptoms of paresthesias, urinary incontinence, and muscle cramping/twitching. Will do evaluation with an MRI of the brain and cervical spine.   Also given his history of possible abnormal EMG/NCS, will repeat this. We will also check neuropathy labs, etc. 
 
RECOMMENDATIONS Problem List Items Addressed This Visit None Visit Diagnoses Numbness/muscle cramping   -  Primary Relevant Orders · CK · PROTEIN ELECTROPHORESIS  
 · ALDOLASE · LD  
 · SED RATE (ESR) · CRP, HIGH SENSITIVITY · MRI BRAIN W WO CONT · MRI CERV SPINE W WO CONT  
 · EMG LIMITED Pressure in head Relevant Orders · MRI BRAIN W WO CONT Urinary frequency Relevant Orders · MRI BRAIN W WO CONT · MRI CERV SPINE W WO CONT  
 · EMG LIMITED Cervical spine arthritis ([address] Relevant Orders · MRI CERV SPINE W WO CONT  
 · EMG LIMITED · [facility]. FU after testing (patient needs conscious sedation MRI so he will call us once this is scheduled to set a follow-up) Kojo Pike MD 
 
CC: Moses Bowens MD 
Fax: [phone] Medications and side effects discussed with patient in detail. With any new medications prescribed, patient was given instructions on administration and side effects. Written medication information was provided to the patient as well. This note was created using voice recognition software. Despite editing, there may be syntax errors. This note will not be viewable in [address]

## 2018-05-14 NOTE — PROGRESS NOTES
NEUROLOGY NEW PATIENT CONSULTATION    REFERRED BY:  Roosevelt Arreaga MD    CHIEF COMPLAINT:  Eval for MS    HISTORY OF PRESENT ILLNESS    HISTORY PROVIDED BY:  Patient      Medina Kaufman is a 63 y.o. male who I am asked to see in consultation for evaluation for MS. Patient reports he has been concerned about this for quite some time. He has a history of being in the [employer] and exposure to . Chemicals that are similar to agent orange. 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  He is not sure completely what exposures he had at that time. His main symptoms are urinary frequency, numbness/pressure in the head, and calf cramping/twitching. Patient's urinary frequency has been issue for quite some time. He will also have Incomplete emptying of his bladder. He may have a 24-hour period with this an issue and then he goes to the bathroom with no difficulty for several weeks at a time. He is on tamsulosin to help with this. He reports he was diagnosed with nocturia and based on this he was concerned for possible MS. Patient's numbness in his head has been ongoing since the 80s. He feels like a rubber band pressure sensation around his head. He has had associated left eye pain at times. This can be episodic as well and it increases his blood pressure. He was seen by neurology at some point for this. He is not currently on treatment for it. He also has a diagnosis of polycythemia. He gets phlebotomy secondary to this. He feels like this does help with the head pressure and jaw pain. Patient also has some calf cramping and twitching of the muscles. This is been ongoing for about 8 years. He wears longjohns at night and uses a heating blanket to help him. They have not found a source for this. Dopplers have been negative. He does have a history of peripheral vascular disease.   He does get numbness in his feet and the filter falling asleep. He has had an EMG/NCS in the past that was told was normal, but patient feels like Arlet Crissy was concerned that something was abnormal but did not tell him. He does have arthritis in his C-spine at C6 and 7 and some left shoulder pain secondary to this. He has never had any neuroimaging done. He does have vitamin D deficiency and is on supplements for this. PMH  Past Medical History:   Diagnosis Date    Depression     GERD (gastroesophageal reflux disease)     Hypertension     Liver disease 1984    Fatty liver    Other ill-defined conditions(799.89)     gallstones    Polycythemia vera(238.4) 4/29/2013    Prostatitis     Sleep apnea 12/2011    doesn't use c-pap;  lost 25 lbs and has improved    Vertigo        SH  Social History     Social History    Marital status:      Spouse name: N/A    Number of children: N/A    Years of education: N/A     Social History Main Topics    Smoking status: Current Every Day Smoker     Packs/day: 1.00     Years: 40.00    Smokeless tobacco: Never Used    Alcohol use No    Drug use: Yes     Special: Marijuana      Comment: rare    Sexual activity: No     Other Topics Concern    Not on file     Social History Narrative       FH  Family History   Problem Relation Age of Onset    Cancer Father     Heart Disease Father     Heart Disease Mother     Kidney Disease Mother     Diabetes Brother        ALLERGIES  Allergies   Allergen Reactions    Sulfur Anaphylaxis, Hives and Seizures    Zyprexa [Olanzapine] Anaphylaxis    Celexa [Citalopram] Other (comments)     groggy    Clindamycin Nausea and Vomiting    Lisinopril Other (comments)    Prozac [Fluoxetine] Anxiety    Risperidone Anxiety       CURRENT MEDS  Current Outpatient Prescriptions   Medication Sig Dispense Refill    fluticasone (FLONASE ALLERGY RELIEF) 50 mcg/actuation nasal spray 2 Sprays by Both Nostrils route daily.       lisinopril (PRINIVIL, ZESTRIL) 5 mg tablet TAKE 1 TO 4 TABLETS BY MOUTH DAILY FOR BLOOD PRESSURE READINGS AS DIRECTED 360 Tab 3    tamsulosin (FLOMAX) 0.4 mg capsule Take 1 Cap by mouth daily. 90 Cap 3    traMADol (ULTRAM) 50 mg tablet TAKE 1 TABLET BY MOUTH EVERY 6 HOURS AS NEEDED FOR PAIN. MAX 200MG IN 1 DAY. 30 Tab 0    QUEtiapine (SEROQUEL) 25 mg tablet Take 1 Tab by mouth two (2) times a day.  Indications: DEPRESSION ASSOCIATED WITH BIPOLAR DISORDER 60 Tab 1    valACYclovir (VALTREX) 500 mg tablet TK 4 TS PO IN PRODROME AND 4 TS PO 12 H LATER  0       REVIEW OF SYSTEMS:     Y  N       Y  N  Y  N   Y  N  [] [x] AIDS          [] [x] Falls  [] [x] Memory Loss  [] [x]  Shortness of breath  [x] [] Anxiety          [x] [x] Fatigue [x] [] Muscle Pain        [] [x]  Skipped beats  [x] [] Chest Pain   [x] [] Frequent HA [x] [] Ms Weakness     [x] []  Snoring  [x] [] Constipation [x] []Hearing loss [] [x] Nause/Vomiting  [x] []  Stomach Pain  [] [x] Cough          [] [x]Hepatitis [] [x] Neuropathy         [x] []  Swallowing difficulty  [x] [] Depression  [x] [x]Incontinence [] [x] Poor appetite      [x] []  Vertigo  [x] [] Diarrhea       [x] [] Joint Pain [x] [] Rash                   [x] []  Visual disturbances  [x] [] Fainting        [] [x] Leg Swelling [x] [] Ringing ears       [] [x]  Weight changes      []Unable to obtain  ROS due to  []mental status change  []sedated   []intubated          PREVIOUS WORKUP  IMAGING: None      LABS  Results for orders placed or performed in visit on 05/02/18   CBC WITH AUTOMATED DIFF   Result Value Ref Range    WBC 8.4 3.4 - 10.8 x10E3/uL    RBC 6.14 (H) 4.14 - 5.80 x10E6/uL    HGB 12.5 (L) 13.0 - 17.7 g/dL    HCT 42.6 37.5 - 51.0 %    MCV 69 (L) 79 - 97 fL    MCH 20.4 (L) 26.6 - 33.0 pg    MCHC 29.3 (L) 31.5 - 35.7 g/dL    RDW 17.9 (H) 12.3 - 15.4 %    PLATELET 414 (H) 812 - 379 x10E3/uL    NEUTROPHILS 56 Not Estab. %    Lymphocytes 33 Not Estab. %    MONOCYTES 7 Not Estab. % EOSINOPHILS 3 Not Estab. %    BASOPHILS 1 Not Estab. %    ABS. NEUTROPHILS 4.7 1.4 - 7.0 x10E3/uL    Abs Lymphocytes 2.8 0.7 - 3.1 x10E3/uL    ABS. MONOCYTES 0.6 0.1 - 0.9 x10E3/uL    ABS. EOSINOPHILS 0.3 0.0 - 0.4 x10E3/uL    ABS. BASOPHILS 0.1 0.0 - 0.2 x10E3/uL    IMMATURE GRANULOCYTES 0 Not Estab. %    ABS. IMM. GRANS. 0.0 0.0 - 0.1 C26L4/UY   METABOLIC PANEL, COMPREHENSIVE   Result Value Ref Range    Glucose 112 (H) 65 - 99 mg/dL    BUN 12 8 - 27 mg/dL    Creatinine 0.93 0.76 - 1.27 mg/dL    GFR est non-AA 89 >59 mL/min/1.73    GFR est  >59 mL/min/1.73    BUN/Creatinine ratio 13 10 - 24    Sodium 144 134 - 144 mmol/L    Potassium 5.0 3.5 - 5.2 mmol/L    Chloride 103 96 - 106 mmol/L    CO2 24 18 - 29 mmol/L    Calcium 9.3 8.6 - 10.2 mg/dL    Protein, total 7.0 6.0 - 8.5 g/dL    Albumin 4.3 3.6 - 4.8 g/dL    GLOBULIN, TOTAL 2.7 1.5 - 4.5 g/dL    A-G Ratio 1.6 1.2 - 2.2    Bilirubin, total 0.3 0.0 - 1.2 mg/dL    Alk. phosphatase 75 39 - 117 IU/L    AST (SGOT) 10 0 - 40 IU/L    ALT (SGPT) 14 0 - 44 IU/L   TSH 3RD GENERATION   Result Value Ref Range    TSH 2.340 0.450 - 4.500 uIU/mL       PHYSICAL EXAM  Visit Vitals    /82    Resp 18    Ht 5' 10\" (1.778 m)    Wt 91.2 kg (201 lb)    BMI 28.84 kg/m2     General:  Alert, cooperative, no distress. Head:  Normocephalic, without obvious abnormality, atraumatic. Eyes:  Conjunctivae/corneas clear. Pupils equal, round, reactive to light. Extraocular movements intact, VFF, NO papilledema   Lungs:  Heart:   Non labored breathing  Regular rate and rhythm, no carotid bruits   Abdomen:   Soft, non-distended   Extremities: Extremities normal, atraumatic, no cyanosis or edema. Pulses: 2+ and symmetric all extremities. Skin: Skin color, texture, turgor normal. No rashes or lesions.    Neurologic:  Gen: Attention normal             Language: naming, repetition, fluency normal             Memory: intact recent and remote memory  Cranial Nerves:  I: smell Not tested   II: visual fields Full to confrontation   II: pupils Equal, round, reactive to light   II: optic disc No papilledema   III,VII: ptosis none   III,IV,VI: extraocular muscles  Full ROM   V: mastication normal   V: facial light touch sensation  normal   VII: facial muscle function   symmetric   VIII: hearing symmetric   IX: soft palate elevation  normal   XI: trapezius strength  5/5   XI: sternocleidomastoid strength 5/5   XI: neck flexion strength  5/5   XII: tongue  midline     Motor: normal bulk and tone, no tremor              Strength: 5/5 all four extremities  Sensory: intact to LT, PP, vibration, and temperature  Coordination: FTN intact, Rhomberg negative  Gait: normal gait including tandem   Reflexes: 2+ throughout       [address] Ninfa Saenz is a 63 y.o. male who presents for evaluation of possible MS. Patient has symptoms of paresthesias, urinary incontinence, and muscle cramping/twitching. Will do evaluation with an MRI of the brain and cervical spine. Also given his history of possible abnormal EMG/NCS, will repeat this. We will also check neuropathy labs, etc.    RECOMMENDATIONS    Problem List Items Addressed This Visit     None      Visit Diagnoses     Numbness/muscle cramping   -  Primary    Relevant Orders    · CK    · PROTEIN ELECTROPHORESIS    · ALDOLASE    · LD    · SED RATE (ESR)    · CRP, HIGH SENSITIVITY    · MRI BRAIN W WO CONT    · MRI CERV SPINE W WO CONT    · EMG LIMITED    Pressure in head        Relevant Orders    · MRI BRAIN W WO CONT    Urinary frequency        Relevant Orders    · MRI BRAIN W WO CONT    · MRI CERV SPINE W WO CONT    · EMG LIMITED    Cervical spine arthritis ([address]        Relevant Orders    · MRI CERV SPINE W WO CONT    · EMG LIMITED      · We will obtain [facility].     FU after testing (patient needs conscious sedation MRI so he will call us once this is scheduled to set a follow-up)     Georgina Hood MD    CC: Lisa Adan, MD  Fax: [phone]    Medications and side effects discussed with patient in detail. With any new medications prescribed, patient was given instructions on administration and side effects. Written medication information was provided to the patient as well. This note was created using voice recognition software. Despite editing, there may be syntax errors. This note will not be viewable in [address]

## 2018-05-14 NOTE — PATIENT INSTRUCTIONS
Learning About Living Naomi  What is a living will? A living will is a legal form you use to write down the kind of care you want at the end of your life. It is used by the health professionals who will treat you if you aren't able to decide for yourself. If you put your wishes in writing, your loved ones and others will know what kind of care you want. They won't need to guess. This can ease your mind and be helpful to others. A living will is not the same as an estate or property will. An estate will explains what you want to happen with your money and property after you die. Is a living will a legal document? A living will is a legal document. Each state has its own laws about living rubalcava. If you move to another state, make sure that your living will is legal in the state where you now live. Or you might use a universal form that has been approved by many states. This kind of form can sometimes be completed and stored online. Your electronic copy will then be available wherever you have a connection to the Internet. In most cases, doctors will respect your wishes even if you have a form from a different state. · You don't need an  to complete a living will. But legal advice can be helpful if your state's laws are unclear, your health history is complicated, or your family can't agree on what should be in your living will. · You can change your living will at any time. Some people find that their wishes about end-of-life care change as their health changes. · In addition to making a living will, think about completing a medical power of  form. This form lets you name the person you want to make end-of-life treatment decisions for you (your \"health care agent\") if you're not able to. Many hospitals and nursing homes will give you the forms you need to complete a living will and a medical power of .   · Your living will is used only if you can't make or communicate decisions for yourself anymore. If you become able to make decisions again, you can accept or refuse any treatment, no matter what you wrote in your living will. · Your state may offer an online registry. This is a place where you can store your living will online so the doctors and nurses who need to treat you can find it right away. What should you think about when creating a living will? Talk about your end-of-life wishes with your family members and your doctor. Let them know what you want. That way the people making decisions for you won't be surprised by your choices. Think about these questions as you make your living will:  · Do you know enough about life support methods that might be used? If not, talk to your doctor so you know what might be done if you can't breathe on your own, your heart stops, or you're unable to swallow. · What things would you still want to be able to do after you receive life-support methods? Would you want to be able to walk? To speak? To eat on your own? To live without the help of machines? · If you have a choice, where do you want to be cared for? In your home? At a hospital or nursing home? · Do you want certain Congregation practices performed if you become very ill? · If you have a choice at the end of your life, where would you prefer to die? At home? In a hospital or nursing home? Somewhere else? · Would you prefer to be buried or cremated? · Do you want your organs to be donated after you die? What should you do with your living will? · Make sure that your family members and your health care agent have copies of your living will. · Give your doctor a copy of your living will to keep in your medical record. If you have more than one doctor, make sure that each one has a copy. · You may want to put a copy of your living will where it can be easily found. Where can you learn more? Go to http://marino-francisco j.info/.   Enter Y737 in the search box to learn more about \"Learning About Living Naomi. \"  Current as of: September 24, 2016  Content Version: 11.4  © 9761-2694 [employer]. Care instructions adapted under license by [employer] (which disclaims liability or warranty for this information). If you have questions about a medical condition or this instruction, always ask your healthcare professional. [address] any warranty or liability for your use of this information. Advance Directives: Care Instructions  Your Care Instructions  An advance directive is a legal way to state your wishes at the end of your life. It tells your family and your doctor what to do if you can no longer say what you want. There are two main types of advance directives. You can change them any time that your wishes change. · A living will tells your family and your doctor your wishes about life support and other treatment. · A durable power of  for health care lets you name a person to make treatment decisions for you when you can't speak for yourself. This person is called a health care agent. If you do not have an advance directive, decisions about your medical care may be made by a doctor or a  who doesn't know you. It may help to think of an advance directive as a gift to the people who care for you. If you have one, they won't have to make tough decisions by themselves. Follow-up care is a key part of your treatment and safety. Be sure to make and go to all appointments, and call your doctor if you are having problems. It's also a good idea to know your test results and keep a list of the medicines you take. How can you care for yourself at home? · Discuss your wishes with your loved ones and your doctor. This way, there are no surprises. · Many states have a unique form. Or you might use a universal form that has been approved by many states. This kind of form can sometimes be completed and stored online.  Your electronic copy will then be available wherever you have a connection to the Internet. In most cases, doctors will respect your wishes even if you have a form from a different state. · You don't need a  to do an advance directive. But you may want to get legal advice. · Think about these questions when you prepare an advance directive:  ¨ Who do you want to make decisions about your medical care if you are not able to? Many people choose a family member or close friend. ¨ Do you know enough about life support methods that might be used? If not, talk to your doctor so you understand. ¨ What are you most afraid of that might happen? You might be afraid of having pain, losing your independence, or being kept alive by machines. ¨ Where would you prefer to die? Choices include your home, a hospital, or a nursing home. ¨ Would you like to have information about hospice care to support you and your family? ¨ Do you want to donate organs when you die? ¨ Do you want certain Yazdanism practices performed before you die? If so, put your wishes in the advance directive. · Read your advance directive every year, and make changes as needed. When should you call for help? Be sure to contact your doctor if you have any questions. Where can you learn more? Go to http://marino-francisco j.info/. Enter R264 in the search box to learn more about \"Advance Directives: Care Instructions. \"  Current as of: September 24, 2016  Content Version: 11.4  © 1497-9573 [employer]. Care instructions adapted under license by [employer] (which disclaims liability or warranty for this information). If you have questions about a medical condition or this instruction, always ask your healthcare professional. [place] [address] any warranty or liability for your use of this information.     [facility]   Statement to Patients  April 1, 2014      In an effort to ensure the large volume of patient prescription refills is processed in the most efficient and expeditious manner, we are asking our patients to assist us by calling your Pharmacy for all prescription refills, this will include also your  Mail Order Pharmacy. The pharmacy will contact our office electronically to continue the refill process. Please do not wait until the last minute to call your pharmacy. We need at least 48 hours (2days) to fill prescriptions. We also encourage you to call your pharmacy before going to  your prescription to make sure it is ready. With regard to controlled substance prescription refill requests (narcotic refills) that need to be picked up at our office, we ask your cooperation by providing us with at least 72 hours (3days) notice that you will need a refill. We will not refill narcotic prescription refill requests after 4:00pm on any weekday, Monday through Thursday, or after 2:00pm on Fridays, or on the weekends. We encourage everyone to explore another way of getting your prescription refill request processed using [employer], our patient web portal through our electronic medical record system. [employer] is an efficient and effective way to communicate your medication request directly to the office and  downloadable as an ervin on your smart phone . [employer] also features a review functionality that allows you to view your medication list as well as leave messages for your physician. Are you ready to get connected? If so please review the attatched instructions or speak to any of our staff to get you set up right away! Thank you so much for your cooperation. Should you have any questions please contact our Practice Administrator. The Physicians and Staff,  [facility]   Patient Instruction Plan/ Result Policy    If we have ordered testing for you, know that; [facility] NEWS IS GOOD NEWS! \" It is our policy that we know longer call patients with results, nor do we  give test results over the phone. We schedule follow up appointments so that your results can be discussed in person. This allows you to address any questions you have regarding the results. If something of concern is revealed on your test, we will contact you to discuss the matter and if needed schedule a sooner follow up appointment. Additionally, results may be found by using the My Chart feature and one of our patient service representatives at the  can give you instructions on how to access this feature to utilize our electronic medical record system. Thank you for your understanding.

## 2018-05-14 NOTE — PROGRESS NOTES
Chief Complaint   Patient presents with    Spasms     thinks he may have MS; feet cramping     1. Have you been to the ER, urgent care clinic since your last visit? Hospitalized since your last visit? No    2. Have you seen or consulted any other health care providers outside of the [address] [place] [address] since your last visit? Include any pap smears or colon screening.  No     Reviewed record in preparation for visit and have necessary documentation  Pt did not bring medication to office visit for review  Medication list reviewed and reconciled with patient  Information was given to pt on Advanced Directives, Living Will  opportunity was given for questions

## 2018-05-16 ENCOUNTER — TELEPHONE (OUTPATIENT)
Dept: NEUROLOGY | Age: 63
End: 2018-05-16

## 2018-05-16 NOTE — TELEPHONE ENCOUNTER
----- Message from [place] sent at 5/16/2018  2:18 PM EDT -----  Regarding: Dr Osuna/telephone  Pt (p) [phone], said he was seen on Monday and given orders for labs he would like to know if he needs to fast for the lab work     Pt gave permission to leave a voice mail message with the information.

## 2018-05-18 ENCOUNTER — APPOINTMENT (OUTPATIENT)
Dept: INFUSION THERAPY | Age: 63
End: 2018-05-18
Payer: COMMERCIAL

## 2018-05-22 LAB
BASOPHILS # BLD AUTO: 0.1 X10E3/UL (ref 0–0.2)
BASOPHILS NFR BLD AUTO: 1 %
CK SERPL-CCNC: 61 U/L (ref 24–204)
EOSINOPHIL # BLD AUTO: 0.2 X10E3/UL (ref 0–0.4)
EOSINOPHIL NFR BLD AUTO: 3 %
ERYTHROCYTE [DISTWIDTH] IN BLOOD BY AUTOMATED COUNT: 18.1 % (ref 12.3–15.4)
ERYTHROCYTE [SEDIMENTATION RATE] IN BLOOD BY WESTERGREN METHOD: 5 MM/HR (ref 0–30)
HCT VFR BLD AUTO: 42.9 % (ref 37.5–51)
HGB BLD-MCNC: 13.4 G/DL (ref 13–17.7)
IMM GRANULOCYTES # BLD: 0 X10E3/UL (ref 0–0.1)
IMM GRANULOCYTES NFR BLD: 0 %
LDH SERPL-CCNC: 123 IU/L (ref 121–224)
LYMPHOCYTES # BLD AUTO: 2.2 X10E3/UL (ref 0.7–3.1)
LYMPHOCYTES NFR BLD AUTO: 26 %
MCH RBC QN AUTO: 21 PG (ref 26.6–33)
MCHC RBC AUTO-ENTMCNC: 31.2 G/DL (ref 31.5–35.7)
MCV RBC AUTO: 67 FL (ref 79–97)
MONOCYTES # BLD AUTO: 0.5 X10E3/UL (ref 0.1–0.9)
MONOCYTES NFR BLD AUTO: 6 %
NEUTROPHILS # BLD AUTO: 5.5 X10E3/UL (ref 1.4–7)
NEUTROPHILS NFR BLD AUTO: 64 %
PLATELET # BLD AUTO: 561 X10E3/UL (ref 150–379)
RBC # BLD AUTO: 6.38 X10E6/UL (ref 4.14–5.8)
WBC # BLD AUTO: 8.4 X10E3/UL (ref 3.4–10.8)

## 2018-05-23 ENCOUNTER — TELEPHONE (OUTPATIENT)
Dept: ONCOLOGY | Age: 63
End: 2018-05-23

## 2018-06-01 ENCOUNTER — PATIENT MESSAGE (OUTPATIENT)
Dept: NEUROLOGY | Age: 63
End: 2018-06-01

## 2018-06-01 DIAGNOSIS — R20.0 NUMBNESS: Primary | ICD-10-CM

## 2018-06-01 DIAGNOSIS — H53.122 TRANSIENT VISUAL LOSS OF LEFT EYE: ICD-10-CM

## 2018-06-01 DIAGNOSIS — D45 POLYCYTHEMIA VERA (HCC): ICD-10-CM

## 2018-06-01 DIAGNOSIS — H53.9 VISION CHANGES: ICD-10-CM

## 2018-06-01 DIAGNOSIS — R51.9 PRESSURE IN HEAD: ICD-10-CM

## 2018-06-15 ENCOUNTER — APPOINTMENT (OUTPATIENT)
Dept: INFUSION THERAPY | Age: 63
End: 2018-06-15

## 2018-07-09 ENCOUNTER — OFFICE VISIT (OUTPATIENT)
Dept: NEUROLOGY | Age: 63
End: 2018-07-09

## 2018-07-09 DIAGNOSIS — M79.609 PAIN IN EXTREMITY, UNSPECIFIED EXTREMITY: Primary | ICD-10-CM

## 2018-07-09 NOTE — PROCEDURES
EMG/ NCS Report   [facility]  [place] Dickey, [address] [place], [facility]   Ph: [phone]   FAX: [phone]  Test Date:  2018    Patient: Anat Barba : 1955 Physician: Daysi Dugan MD   Sex: Male Height: ' \" Ref Cira Dark   ID#: [id number] Weight:  lbs. Technician: Adam Oquendo     Patient History / Exam:  CC:NEUROPATHY,MOTOR NEURON DISEASE. Patient is coming for pain and muscle cramps of the left lower extremity. Also has tingling pain in tips of fingers of left hand. (-) weakness    Patient is coming for neuropathy evaluation. EMG & NCV Findings:  Evaluation of the left Fibular motor nerve showed normal distal onset latency (3.8 ms), normal amplitude (3.5 mV), and normal conduction velocity (Poplt-B Fib, 43 m/s). The left median motor nerve showed normal distal onset latency (3.6 ms), normal amplitude (8.0 mV), and normal conduction velocity (Elbow-Wrist, 57 m/s). The left tibial motor nerve showed normal distal onset latency (3.2 ms), normal amplitude (7.9 mV), and normal conduction velocity (Knee-Ankle, 42 m/s). The left ulnar motor nerve showed normal distal onset latency (2.4 ms), normal amplitude (12.2 mV), normal conduction velocity (B Elbow-Wrist, 60 m/s), and normal conduction velocity (A Elbow-B Elbow, 59 m/s). The left median sensory, the left radial sensory, the left sural sensory, and the left ulnar sensory nerves showed normal distal peak latency (L3.2, L2.3, L3.6, L2.9 ms) and normal amplitude (L26.6, L40.5, L9.3, L38.1 µV). The left Sup Fibular sensory nerve showed normal distal peak latency (2.6 ms), normal amplitude (23.5 µV), and normal conduction velocity (Lower leg-Lat ankle, 50 m/s).   The left median/ulnar (palm) comparison nerve showed normal distal onset latency (Median Palm, 1.7 ms), normal distal peak latency (Median Palm, 2.2 ms), normal amplitude (Median Palm, 77.3 µV), normal distal onset latency (Ulnar Palm, 0.9 ms), normal distal peak latency (Ulnar Palm, 1.7 ms), and normal amplitude (Ulnar Palm, 31.4 µV). All F Wave latencies were within normal limits. All examined muscles (as indicated in the following table) showed no evidence of electrical instability. 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 There is no evidence of a left cervical motor radiculopathy    Electrodiagnostic examination of the left lower extremity is within normal limits.     Javi Coyne MD  Diplomate, American Board of Psychiatry and Neurology  Diplomate, Neuromuscular Medicine  Diplomate, American Board of Electrodiagnostic Medicine  Director, [address] [place] [address] Accredited Laboratory with Exemplary Status    Nerve Conduction Studies  Anti Sensory Summary Table     Stim Site NR Peak (ms) Norm Peak (ms) P-T Amp (µV) Norm P-T Amp Site1 Site2 Dist (cm)   Left Median Anti Sensory (2nd Digit)  31.4°C   Wrist    3.2 <4 26.6 >13 Wrist 2nd Digit 14.0   Left Radial Anti Sensory (Base 1st Digit)  31.1°C   Wrist    2.3 <2.8 40.5 >11 Wrist Base 1st Digit 10.0   Left Sup Fibular Anti Sensory (Lat ankle)  31.3°C   Lower leg    2.6 <4.6 23.5 >4 Lower leg Lat ankle 10.0   Left Sural Anti Sensory (Lat Mall)  33.4°C   Calf    3.6 <4.5 9.3 >4.0 Calf Lat Mall 14.0   Left Ulnar Anti Sensory (5th Digit)  31.3°C   Wrist    2.9 <4.0 38.1 >9 Wrist 5th Digit 14.0     Motor Summary Table     Stim Site NR Onset (ms) Norm Onset (ms) O-P Amp (mV) Norm O-P Amp Amp (Prev) (%) Site1 Site2 Dist (cm) Douglas (m/s) Norm Douglas (m/s)   Left Fibular Motor (Ext Dig Brev)  29.6°C   Ankle    3.8 <6.5 3.5 >1.1 100.0 Ankle Ext Dig Brev 8.0     B Fib    10.2  3.0  85.7 B Fib Ankle 0.0  >38   Poplt    12.5  2.6  86.7 Poplt B Fib 10.0 43 >42   Left Median Motor (Abd Poll Brev)  31°C   Wrist    3.6 <4.5 8.0 >4.1 100.0 Wrist Abd Poll Brev 8.0     Elbow    7.3  7.4  92.5 Elbow Wrist 21.0 57 >49   Left Tibial Motor (Abd Molina Brev)  29.5°C   Ankle    3.2 <6.1 7.9 >1.1 100.0 Ankle Abd Molina Brev 8.0     Knee    11.5  7.1  89.9 Knee Ankle 35.0 42 >39   Left Ulnar Motor (Abd Dig Minimi)  30.9°C   Wrist    2.4 <3.1 12.2 >7.0 100.0 Wrist Abd Dig Minimi 8.0  >50   B Elbow    5.9  11.0  90.2 B Elbow Wrist 21.0 60 >50   A Elbow    7.6  10.4  94.5 A Elbow B Elbow 10.0 59 >50     Comparison Summary Table     Stim Site NR Peak (ms) P-T Amp (µV) Site1 Site2 Dist (cm) Delta-0 (ms)   Left Median/Ulnar Palm Comparison (Wrist)  31.5°C   Median Palm    2.2 103.5 Median Palm Ulnar Palm 8.0 0.8   Ulnar Palm    1.7 40.9         F Wave Studies     NR F-Lat (ms) Lat Norm (ms) L-R F-Lat (ms) L-R Lat Norm   Left Tibial (Mrkrs) (Abd Hallucis)  29.4°C      55.70 <56  <5.7   Left Ulnar (Mrkrs) (Abd Dig Min)  30.9°C      27.07 <32  <2.5     H Reflex Studies     NR H-Lat (ms) L-R H-Lat (ms) L-R Lat Norm   Left Tibial (Gastroc)  29.4°C      35.33  <2.0     EMG     Side Muscle Nerve Root Ins Act Fibs Psw Recrt Duration Amp Poly Comment   Left Ext Dig Brev Dp Br Peron L5, S1 Nml Nml Nml Nml Nml Nml Nml    Left AbdHallucis MedPlantar S1-2 Nml Nml Nml Nml Nml Nml Nml    Left AntTibialis Dp Br Peron L4-5 Nml Nml Nml Nml Nml Nml Nml    Left MedGastroc Tibial S1-2 Nml Nml Nml Nml Nml Nml Nml    Left VastusLat Femoral L2-4 Nml Nml Nml Nml Nml Nml Nml    Left 1stDorInt Ulnar C8-T1 Nml Nml Nml Nml Nml Nml Nml    Left ExtIndicis Radial (Post Int) C7-8 Nml Nml Nml Nml Nml Nml Nml    Left Abd Poll Brev Median C8-T1 Nml Nml Nml Nml Nml Nml Nml    Left Biceps Musculocut C5-6 Nml Nml Nml Nml Nml Nml Nml    Left Triceps Radial C6-7-8 Nml Nml Nml Nml Nml Nml Nml    Left Deltoid Axillary C5-6 Nml Nml Nml Nml Nml Nml Nml    Left Lower Cerv Parasp Rami C7,T1 Nml Nml Nml Nml Nml Nml Nml    Left GluteusMed SupGluteal L4-S1 Nml Nml Nml Nml Nml Nml Nml    Left Lower Lumb Parasp Rami L5,S1 Nml Nml Nml Nml Nml Nml Nml                Nerve Conduction Studies  Anti Sensory Left/Right Comparison     Stim Site L Lat (ms) R Lat (ms) L-R Lat (ms) L Amp (µV) R Amp (µV) L-R Amp (%) Site1 Site2 L Douglas (m/s) R Douglas (m/s) L-R Douglas (m/s)   Median Anti Sensory (2nd Digit)  31.4°C   Wrist 2.6   26.6   Wrist 2nd Digit 54     Radial Anti Sensory (Base 1st Digit)  31.1°C   Wrist 1.6   40.5   Wrist Base 1st Digit 63     Sup Fibular Anti Sensory (Lat ankle)  31.3°C   Lower leg 2.0   23.5   Lower leg Lat ankle 50     Sural Anti Sensory (Lat Mall)  33.4°C   Calf 3.0   9.3   Calf Lat Mall 47     Ulnar Anti Sensory (5th Digit)  31.3°C   Wrist 2.3   38.1   Wrist 5th Digit 61       Motor Left/Right Comparison     Stim Site L Lat (ms) R Lat (ms) L-R Lat (ms) L Amp (mV) R Amp (mV) L-R Amp (%) Site1 Site2 L Douglas (m/s) R Douglas (m/s) L-R Douglas (m/s)   Fibular Motor (Ext Dig Brev)  29.6°C   Ankle 3.8   3.5   Ankle Ext Dig Brev      B Fib 10.2   3.0   B Fib Ankle      Poplt 12.5   2.6   Poplt B Fib 43     Median Motor (Abd Poll Brev)  31°C   Wrist 3.6   8.0   Wrist Abd Poll Brev      Elbow 7.3   7.4   Elbow Wrist 57     Tibial Motor (Abd Molina Brev)  29.5°C   Ankle 3.2   7.9   Ankle Abd Molina Brev      Knee 11.5   7.1   Knee Ankle 42     Ulnar Motor (Abd Dig Minimi)  30.9°C   Wrist 2.4   12.2   Wrist Abd Dig Minimi      B Elbow 5.9   11.0   B Elbow Wrist 60     A Elbow 7.6   10.4   A Elbow B Elbow 59       Comparison Left/Right Comparison     Stim Site L Lat (ms) R Lat (ms) L-R Lat (ms) L Amp (µV) R Amp (µV) L-R Amp (%)   Median/Ulnar Palm Comparison (Wrist)  31.5°C   Median Palm 1.7   77.3     Ulnar Palm 0.9   31.4           Waveforms:

## 2018-07-12 ENCOUNTER — HOSPITAL ENCOUNTER (OUTPATIENT)
Dept: MRI IMAGING | Age: 63
Discharge: HOME | End: 2018-07-12
Attending: PSYCHIATRY & NEUROLOGY
Payer: COMMERCIAL

## 2018-07-12 VITALS — DIASTOLIC BLOOD PRESSURE: 75 MMHG | RESPIRATION RATE: 17 BRPM | HEART RATE: 65 BPM | SYSTOLIC BLOOD PRESSURE: 146 MMHG

## 2018-07-12 DIAGNOSIS — R25.2 MUSCLE CRAMP, NOCTURNAL: ICD-10-CM

## 2018-07-12 DIAGNOSIS — M47.812 CERVICAL SPINE ARTHRITIS: ICD-10-CM

## 2018-07-12 DIAGNOSIS — R51.9 PRESSURE IN HEAD: ICD-10-CM

## 2018-07-12 DIAGNOSIS — R35.0 URINARY FREQUENCY: ICD-10-CM

## 2018-07-12 DIAGNOSIS — R20.0 NUMBNESS: ICD-10-CM

## 2018-07-12 DIAGNOSIS — H53.9 VISION CHANGES: ICD-10-CM

## 2018-07-12 DIAGNOSIS — D45 POLYCYTHEMIA VERA (HCC): ICD-10-CM

## 2018-07-12 DIAGNOSIS — H53.122 TRANSIENT VISUAL LOSS OF LEFT EYE: ICD-10-CM

## 2018-07-12 PROCEDURE — 74011250636 HC RX REV CODE- 250/636: Performed by: RADIOLOGY

## 2018-07-12 RX ORDER — FENTANYL CITRATE 50 UG/ML
100 INJECTION, SOLUTION INTRAMUSCULAR; INTRAVENOUS
Status: DISCONTINUED | OUTPATIENT
Start: 2018-07-12 | End: 2018-07-12

## 2018-07-12 RX ORDER — DIPHENHYDRAMINE HYDROCHLORIDE 50 MG/ML
50 INJECTION, SOLUTION INTRAMUSCULAR; INTRAVENOUS
Status: DISCONTINUED | OUTPATIENT
Start: 2018-07-12 | End: 2018-07-12

## 2018-07-12 RX ORDER — MIDAZOLAM HYDROCHLORIDE 1 MG/ML
5 INJECTION, SOLUTION INTRAMUSCULAR; INTRAVENOUS
Status: DISCONTINUED | OUTPATIENT
Start: 2018-07-12 | End: 2018-07-12

## 2018-07-12 NOTE — H&P
Radiology History and Physical    Patient: Rosario Feliz 66 y.o. male       Chief Complaint: No chief complaint on file. History of Present Illness: Claustrophobia for sedation prior to MRI. History:    Past Medical History:   Diagnosis Date    Depression     GERD (gastroesophageal reflux disease)     Hypertension     Liver disease 1984    Fatty liver    Other ill-defined conditions(799.89)     gallstones    Polycythemia vera(238.4) 4/29/2013    Prostatitis     Sleep apnea 12/2011    doesn't use c-pap;  lost 25 lbs and has improved    Vertigo      Family History   Problem Relation Age of Onset    Cancer Father     Heart Disease Father     Heart Disease Mother     Kidney Disease Mother     Diabetes Brother      Social History     Social History    Marital status:      Spouse name: N/A    Number of children: N/A    Years of education: N/A     Occupational History    Not on file. Social History Main Topics    Smoking status: Current Every Day Smoker     Packs/day: 1.00     Years: 40.00    Smokeless tobacco: Never Used    Alcohol use No    Drug use: Yes     Special: Marijuana      Comment: rare    Sexual activity: No     Other Topics Concern    Not on file     Social History Narrative       Allergies: Allergies   Allergen Reactions    Sulfur Anaphylaxis, Hives and Seizures    Zyprexa [Olanzapine] Anaphylaxis    Celexa [Citalopram] Other (comments)     groggy    Clindamycin Nausea and Vomiting    Lisinopril Other (comments)    Prozac [Fluoxetine] Anxiety    Risperidone Anxiety       Current Medications:  Current Outpatient Prescriptions   Medication Sig    fluticasone (FLONASE ALLERGY RELIEF) 50 mcg/actuation nasal spray 2 Sprays by Both Nostrils route daily.  QUEtiapine (SEROQUEL) 25 mg tablet Take 1 Tab by mouth two (2) times a day.  Indications: DEPRESSION ASSOCIATED WITH BIPOLAR DISORDER    lisinopril (PRINIVIL, ZESTRIL) 5 mg tablet TAKE 1 TO 4 TABLETS BY MOUTH DAILY FOR BLOOD PRESSURE READINGS AS DIRECTED    valACYclovir (VALTREX) 500 mg tablet TK 4 TS PO IN PRODROME AND 4 TS PO 12 H LATER    tamsulosin (FLOMAX) 0.4 mg capsule Take 1 Cap by mouth daily.  traMADol (ULTRAM) 50 mg tablet TAKE 1 TABLET BY MOUTH EVERY 6 HOURS AS NEEDED FOR PAIN. MAX 200MG IN 1 DAY. Current Facility-Administered Medications   Medication Dose Route Frequency    midazolam (VERSED) injection 5 mg  5 mg IntraVENous RAD PRN    fentaNYL citrate (PF) injection 100 mcg  100 mcg IntraVENous RAD PRN    diphenhydrAMINE (BENADRYL) injection 50 mg  50 mg IntraVENous RAD PRN        Physical Exam:  There were no vitals taken for this visit. GENERAL: alert, cooperative, no distress, appears stated age, LUNG: clear to auscultation bilaterally, HEART: regular rate and rhythm      Alerts:    Hospital Problems  Date Reviewed: 5/14/2018    None          Laboratory:    No results for input(s): HGB, HCT, WBC, PLT, INR, BUN, CREA, K, CRCLT, HGBEXT, HCTEXT, PLTEXT in the last 72 hours. No lab exists for component: PTT, PT, INREXT      Plan of Care/Planned Procedure:  Risks, benefits, and alternatives reviewed with patient and he agrees to proceed with the procedure.        Kitty Hutson MD

## 2018-07-12 NOTE — PROGRESS NOTES
Pt received to [facility] ambulatory. Changed to gown and assessed. Confirmed NPO/allergies. IV started in R AC, vitals taken and stable. LS clear, HS S1, S2. Mallampati 2 with 3 fingers. Dr Deeann Dancer here for consent. Pt states he would like to try without sedation as the scan is shorter than he thought it would be. To MRI, pt unable to tolerate cage over face, and after lengthy discussion, did not want to continue with scan or sedation. He states he will speak to MD to discuss other options. IV removed, pt dressed and walked out for discharge.

## 2018-07-31 ENCOUNTER — TELEPHONE (OUTPATIENT)
Dept: NEUROLOGY | Age: 63
End: 2018-07-31

## 2018-07-31 NOTE — TELEPHONE ENCOUNTER
It looks like we already ordered it under conscious sedation and he already had an evaluation by radiology. He should be all set. Please have him contact the MRI department directly to ensure it is scheduled correctly.

## 2018-07-31 NOTE — TELEPHONE ENCOUNTER
LM on     Per patient message they want sedation for MRI on Friday. Tried to call patient to see exactly what they needed.

## 2018-07-31 NOTE — TELEPHONE ENCOUNTER
----- Message from Jace Shelley sent at 7/31/2018 12:04 PM EDT -----  Regarding: Dr Osuna/telephone  Pt (p) [phone],XE said that he will need something to put him under in order to take the MRI test, that is done on Fridays, he  Wants to make sure it will be approved , Dr Bennett Hawkins may need to submit an order for that request., He said he was not able to take the MRI test, while being awake during the test.

## 2018-10-11 RX ORDER — TAMSULOSIN HYDROCHLORIDE 0.4 MG/1
0.4 CAPSULE ORAL DAILY
Qty: 90 CAP | Refills: 3 | Status: SHIPPED | OUTPATIENT
Start: 2018-10-11 | End: 2019-08-26 | Stop reason: SDUPTHER

## 2018-10-11 NOTE — TELEPHONE ENCOUNTER
From: Ryan Brar  To: Dario Leiva MD  Sent: 10/11/2018 5:16 AM EDT  Subject: Medication Renewal Request    Original authorizing provider: MD Eduar Driscoll. Val Schmitt would like a refill of the following medications:  tamsulosin (FLOMAX) 0.4 mg capsule Dario Leiva MD]    Preferred pharmacy: [address] [place] [address] RD AT [facility]    Comment:  Need immediate refill sent to [facility] in [place].  Thanks

## 2018-10-12 DIAGNOSIS — D45 POLYCYTHEMIA VERA (HCC): Primary | Chronic | ICD-10-CM

## 2018-12-04 ENCOUNTER — DOCUMENTATION ONLY (OUTPATIENT)
Dept: FAMILY MEDICINE CLINIC | Age: 63
End: 2018-12-04

## 2018-12-04 DIAGNOSIS — G89.29 CHRONIC NONINTRACTABLE HEADACHE, UNSPECIFIED HEADACHE TYPE: Primary | ICD-10-CM

## 2018-12-04 DIAGNOSIS — R51.9 CHRONIC NONINTRACTABLE HEADACHE, UNSPECIFIED HEADACHE TYPE: Primary | ICD-10-CM

## 2018-12-04 RX ORDER — TRAMADOL HYDROCHLORIDE 50 MG/1
50 TABLET ORAL
Qty: 30 TAB | Refills: 0 | Status: SHIPPED | OUTPATIENT
Start: 2018-12-04 | End: 2019-08-26 | Stop reason: SDUPTHER

## 2018-12-04 NOTE — PROGRESS NOTES
Refill for Tramadol 50 mg not provided. Called and spoke to patient. Cathy Cota) Pt states understanding of need to return to office for f/u appointment to refill medication and will call back to schedule.

## 2018-12-04 NOTE — TELEPHONE ENCOUNTER
Tramadol 50 mg not provided. Called and spoke to patient. Peggy Oakley) Pt states understanding of need to return to office for f/u appointment to refill medication and will call back to schedule.

## 2019-08-26 ENCOUNTER — OFFICE VISIT (OUTPATIENT)
Dept: FAMILY MEDICINE CLINIC | Age: 64
End: 2019-08-26

## 2019-08-26 VITALS
OXYGEN SATURATION: 97 % | HEART RATE: 65 BPM | RESPIRATION RATE: 22 BRPM | HEIGHT: 70 IN | SYSTOLIC BLOOD PRESSURE: 152 MMHG | TEMPERATURE: 98 F | WEIGHT: 205 LBS | DIASTOLIC BLOOD PRESSURE: 85 MMHG | BODY MASS INDEX: 29.35 KG/M2

## 2019-08-26 DIAGNOSIS — F31.9 BIPOLAR 1 DISORDER (HCC): ICD-10-CM

## 2019-08-26 DIAGNOSIS — N40.0 BENIGN PROSTATIC HYPERPLASIA, UNSPECIFIED WHETHER LOWER URINARY TRACT SYMPTOMS PRESENT: ICD-10-CM

## 2019-08-26 DIAGNOSIS — M25.572 ACUTE LEFT ANKLE PAIN: ICD-10-CM

## 2019-08-26 DIAGNOSIS — R51.9 CHRONIC NONINTRACTABLE HEADACHE, UNSPECIFIED HEADACHE TYPE: ICD-10-CM

## 2019-08-26 DIAGNOSIS — E78.2 MIXED HYPERLIPIDEMIA: ICD-10-CM

## 2019-08-26 DIAGNOSIS — G89.29 CHRONIC NONINTRACTABLE HEADACHE, UNSPECIFIED HEADACHE TYPE: ICD-10-CM

## 2019-08-26 DIAGNOSIS — D45 POLYCYTHEMIA VERA (HCC): ICD-10-CM

## 2019-08-26 DIAGNOSIS — Z00.00 ROUTINE GENERAL MEDICAL EXAMINATION AT A HEALTH CARE FACILITY: Primary | ICD-10-CM

## 2019-08-26 DIAGNOSIS — I10 ESSENTIAL HYPERTENSION, BENIGN: ICD-10-CM

## 2019-08-26 DIAGNOSIS — M10.9 GOUT, UNSPECIFIED CAUSE, UNSPECIFIED CHRONICITY, UNSPECIFIED SITE: ICD-10-CM

## 2019-08-26 RX ORDER — LANOLIN ALCOHOL/MO/W.PET/CERES
840 CREAM (GRAM) TOPICAL DAILY
COMMUNITY
End: 2019-11-26 | Stop reason: SDUPTHER

## 2019-08-26 RX ORDER — TRAMADOL HYDROCHLORIDE 50 MG/1
50 TABLET ORAL
Qty: 28 TAB | Refills: 0 | Status: SHIPPED | OUTPATIENT
Start: 2019-08-26 | End: 2019-09-25

## 2019-08-26 RX ORDER — CHOLECALCIFEROL (VITAMIN D3) 125 MCG
1 CAPSULE ORAL DAILY
COMMUNITY
End: 2019-08-26 | Stop reason: SDUPTHER

## 2019-08-26 RX ORDER — MAGNESIUM 200 MG
5000 TABLET ORAL
COMMUNITY

## 2019-08-26 RX ORDER — TAMSULOSIN HYDROCHLORIDE 0.4 MG/1
0.4 CAPSULE ORAL DAILY
Qty: 90 CAP | Refills: 3 | Status: SHIPPED | OUTPATIENT
Start: 2019-08-26 | End: 2020-08-31

## 2019-08-26 RX ORDER — COLCHICINE 0.6 MG/1
0.6 TABLET ORAL
Qty: 30 TAB | Refills: 5 | Status: SHIPPED | OUTPATIENT
Start: 2019-08-26 | End: 2021-03-30 | Stop reason: ALTCHOICE

## 2019-08-26 RX ORDER — VALACYCLOVIR HYDROCHLORIDE 500 MG/1
500 TABLET, FILM COATED ORAL DAILY
Qty: 20 TAB | Refills: 0 | Status: SHIPPED | OUTPATIENT
Start: 2019-08-26 | End: 2020-10-23

## 2019-08-26 RX ORDER — AMLODIPINE BESYLATE 10 MG/1
TABLET ORAL DAILY
COMMUNITY
End: 2019-11-26 | Stop reason: SDUPTHER

## 2019-08-26 RX ORDER — CHOLECALCIFEROL (VITAMIN D3) 125 MCG
1 CAPSULE ORAL DAILY
Qty: 90 TAB | Refills: 3 | Status: SHIPPED | OUTPATIENT
Start: 2019-08-26 | End: 2020-08-31

## 2019-08-26 NOTE — PROGRESS NOTES
Patient here for physical, non-fasting labs. Need order for labs to take to [facility].  Immunization, Hep C screening , A1C.    1. Have you been to the ER, urgent care clinic since your last visit? Hospitalized since your last visit? No    2. Have you seen or consulted any other health care providers outside of the [facility] [place] since your last visit? Include any pap smears or colon screening. No     Chief Complaint   Patient presents with    Follow-up     labs for [facility]     he is a 60y.o. year old male who presents for evalution. Reviewed PmHx, RxHx, FmHx, SocHx, AllgHx and updated and dated in the chart. Patient Active Problem List    Diagnosis    Gout    Bipolar 1 disorder ([facility] [address] [facility]    Benign prostatic hyperplasia    Chronic nonintractable headache    Anxiety and depression    Vertigo    Gallstones    Ringing in ears    Fatigue    Polycythemia vera ([facility] [address]    Mixed hyperlipidemia    LAINE (obstructive sleep apnea)    Essential hypertension, benign    Depression       Review of Systems - negative except as listed above in the HPI    Objective:     Vitals:    08/26/19 1418   BP: 152/85   Pulse: 65   Resp: 22   Temp: 98 °F (36.7 °C)   SpO2: 97%   Weight: 205 lb (93 kg)   Height: 5' 10\" (1.778 m)     Physical Examination: General appearance - alert, well appearing, and in no distress  Neck - supple, no significant adenopathy  Chest - clear to auscultation, no wheezes, rales or rhonchi, symmetric air entry  Heart - normal rate, regular rhythm, normal S1, S2, no murmurs, rubs, clicks or gallops  Abdomen - soft, nontender, nondistended, no masses or organomegaly  Extremities - peripheral pulses normal, no pedal edema, no clubbing or cyanosis    Assessment/ Plan:   Diagnoses and all orders for this visit:    1.  Routine general medical examination at a health care facility  -     LIPID PANEL  -     METABOLIC PANEL, COMPREHENSIVE  -     PSA, DIAGNOSTIC (PROSTATE SPECIFIC AG)  -     CBC WITH AUTOMATED DIFF  -     TSH 3RD GENERATION  -     HEMOGLOBIN A1C WITH EAG    2. Chronic nonintractable headache, unspecified headache type  -stble    3. Gout, unspecified cause, unspecified chronicity, unspecified site  -     traMADol (ULTRAM) 50 mg tablet; Take 1 Tab by mouth every eight (8) hours as needed for Pain for up to 30 days. Max Daily Amount: 150 mg. Indications: gout flares  -     URIC ACID  -     colchicine 0.6 mg tablet; Take 1 Tab by mouth two (2) times daily as needed for Pain (gout). -dwp diet and changes, did not like allopurinol in past    4. Bipolar 1 disorder (HCC)  -stable    5. Polycythemia vera ([facility] [address]  -     CBC WITH AUTOMATED DIFF    6. Benign prostatic hyperplasia, unspecified whether lower urinary tract symptoms present    7. Mixed hyperlipidemia  -     LIPID PANEL  -     METABOLIC PANEL, COMPREHENSIVE    8. Essential hypertension, benign  -     LIPID PANEL  -     METABOLIC PANEL, COMPREHENSIVE  -inc with pain    9. Acute left ankle pain  -in walking boot and surgery is being dw MD    Other orders  -     valACYclovir (VALTREX) 500 mg tablet; Take 1 Tab by mouth daily. -     cholecalciferol, vitamin D3, 2,000 unit tab; Take 1 Tab by mouth daily. -     tamsulosin (FLOMAX) 0.4 mg capsule; Take 1 Cap by mouth daily.       -Patient is in good health  -Discussed with patient cancer risk factors and screens needed  -Colonoscopy was recommended based on current guidelines for screening.  -Labs from previous visits were discussed with patient yes  -Discussed with patient diet and exercise  -Immunizations appropriate for age were discussed with pt and updated  -    I have discussed the diagnosis with the patient and the intended plan as seen in the above orders. The patient understands and agrees with the plan. The patient has received an after-visit summary and questions were answered concerning future plans.      Medication Side Effects and Warnings were discussed with patient  Patient Labs were reviewed and or requested  Patient Past Records were reviewed and or requested     There are no Patient Instructions on file for this visit.         Natalie Good M.D.

## 2019-08-30 ENCOUNTER — TELEPHONE (OUTPATIENT)
Dept: FAMILY MEDICINE CLINIC | Age: 64
End: 2019-08-30

## 2019-08-30 NOTE — TELEPHONE ENCOUNTER
Patient would like to discuss labwork when results come in. His phone: [phone]. He wanted to discuss faxing the results to the [facility].  The [facility]'s  fax is: [phone]

## 2019-08-31 LAB
ABSOLUTE BANDS, 67058: ABNORMAL
ABSOLUTE BLASTS: ABNORMAL
ABSOLUTE METAMYELOCYTES, 900360: ABNORMAL
ABSOLUTE MYELOCYTES: ABNORMAL
ABSOLUTE NRBC,ANRBC: ABNORMAL
ABSOLUTE PROMYELOCYTES: ABNORMAL
ALB/GLOBRATIO, 58C: 1.8 (CALC) (ref 1–2.5)
ALBUMIN SERPL-MCNC: 4.3 G/DL (ref 3.6–5.1)
ALP SERPL-CCNC: 95 U/L (ref 40–115)
ALT SERPL-CCNC: 20 U/L (ref 9–46)
AST SERPL W P-5'-P-CCNC: 15 U/L (ref 10–35)
BANDS,BANDS: ABNORMAL
BASOPHILS # BLD: 103 CELLS/UL (ref 0–200)
BASOPHILS NFR BLD: 1.3 %
BILIRUB SERPL-MCNC: 0.5 MG/DL (ref 0.2–1.2)
BLASTS,BLAST: ABNORMAL
BUN SERPL-MCNC: 17 MG/DL (ref 7–25)
BUN/CREATININE RATIO,BUCR: ABNORMAL (CALC) (ref 6–22)
CALCIUM SERPL-MCNC: 9.1 MG/DL (ref 8.6–10.3)
CBC MORPHOLOGY: NORMAL
CHLORIDE SERPL-SCNC: 105 MMOL/L (ref 98–110)
CHOL/HDL RATIO,CHHDX: 3.9 (CALC)
CHOLEST SERPL-MCNC: 133 MG/DL
CO2 SERPL-SCNC: 29 MMOL/L (ref 20–32)
COMMENT(S): ABNORMAL
CREAT SERPL-MCNC: 1.18 MG/DL (ref 0.7–1.25)
EAG (MG/DL),9916804: 111 (CALC)
EAG (MMOL/L),9916805: 6.2 (CALC)
EOSINOPHIL # BLD: 229 CELLS/UL (ref 15–500)
EOSINOPHIL NFR BLD: 2.9 %
ERYTHROCYTE [DISTWIDTH] IN BLOOD BY AUTOMATED COUNT: 20.3 % (ref 11–15)
GLOBULIN,GLOB: 2.4 G/DL (CALC) (ref 1.9–3.7)
GLUCOSE SERPL-MCNC: 121 MG/DL (ref 65–99)
HBA1C MFR BLD HPLC: 5.5 % OF TOTAL HGB
HCT VFR BLD AUTO: 48.1 % (ref 38.5–50)
HDLC SERPL-MCNC: 34 MG/DL
HGB BLD-MCNC: 14.2 G/DL (ref 13.2–17.1)
LDL-CHOLESTEROL: 78 MG/DL (CALC)
LYMPHOCYTES # BLD: 2402 CELLS/UL (ref 850–3900)
LYMPHOCYTES NFR BLD: 30.4 %
MCH RBC QN AUTO: 20.4 PG (ref 27–33)
MCHC RBC AUTO-ENTMCNC: 29.5 G/DL (ref 32–36)
MCV RBC AUTO: 69.2 FL (ref 80–100)
METAMYELOCYTES,METAS: ABNORMAL
MONOCYTES # BLD: 521 CELLS/UL (ref 200–950)
MONOCYTES NFR BLD: 6.6 %
MYELOCYTES,MYELO: ABNORMAL
NEUTROPHILS # BLD AUTO: 4645 CELLS/UL (ref 1500–7800)
NEUTROPHILS # BLD: 58.8 %
NON-HDL CHOLESTEROL, 011976: 99 MG/DL (CALC)
NRBC: ABNORMAL
PLATELET # BLD AUTO: 478 THOUSAND/UL (ref 140–400)
PLATELET ESTIMATE,PLTE: NORMAL
PMV BLD AUTO: 8.6 FL (ref 7.5–12.5)
POTASSIUM SERPL-SCNC: 4.4 MMOL/L (ref 3.5–5.3)
PROMYELOCYTES,PRO: ABNORMAL
PROT SERPL-MCNC: 6.7 G/DL (ref 6.1–8.1)
PSA TOTAL,4108: 0.9 NG/ML
RBC # BLD AUTO: 6.95 MILLION/UL (ref 4.2–5.8)
REACTIVE LYMPHS: ABNORMAL
SODIUM SERPL-SCNC: 139 MMOL/L (ref 135–146)
TRIGL SERPL-MCNC: 127 MG/DL (ref ?–150)
TSH SERPL DL<=0.005 MIU/L-ACNC: 2.75 MIU/L (ref 0.4–4.5)
URATE SERPL-MCNC: 8 MG/DL (ref 4–8)
WBC # BLD AUTO: 7.9 THOUSAND/UL (ref 3.8–10.8)

## 2019-11-25 RX ORDER — LISINOPRIL 5 MG/1
TABLET ORAL
Qty: 360 TAB | Refills: 3 | Status: SHIPPED | OUTPATIENT
Start: 2019-11-25 | End: 2021-03-30 | Stop reason: SDUPTHER

## 2019-11-26 RX ORDER — LANOLIN ALCOHOL/MO/W.PET/CERES
840 CREAM (GRAM) TOPICAL DAILY
Qty: 180 TAB | Refills: 3 | Status: SHIPPED | OUTPATIENT
Start: 2019-11-26 | End: 2021-03-31

## 2019-11-26 RX ORDER — AMLODIPINE BESYLATE 10 MG/1
10 TABLET ORAL DAILY
Qty: 90 TAB | Refills: 3 | Status: SHIPPED | OUTPATIENT
Start: 2019-11-26 | End: 2021-03-30 | Stop reason: ALTCHOICE

## 2019-12-09 RX ORDER — ALLOPURINOL 300 MG/1
300 TABLET ORAL DAILY
Qty: 90 TAB | Refills: 3 | Status: SHIPPED | OUTPATIENT
Start: 2019-12-09 | End: 2021-04-21

## 2019-12-26 ENCOUNTER — HOSPITAL ENCOUNTER (OUTPATIENT)
Dept: GENERAL RADIOLOGY | Age: 64
Discharge: HOME | End: 2019-12-26
Payer: COMMERCIAL

## 2019-12-26 ENCOUNTER — OFFICE VISIT (OUTPATIENT)
Dept: FAMILY MEDICINE CLINIC | Age: 64
End: 2019-12-26

## 2019-12-26 VITALS
OXYGEN SATURATION: 98 % | SYSTOLIC BLOOD PRESSURE: 142 MMHG | DIASTOLIC BLOOD PRESSURE: 83 MMHG | TEMPERATURE: 98.5 F | RESPIRATION RATE: 16 BRPM | HEIGHT: 70 IN | BODY MASS INDEX: 30.64 KG/M2 | WEIGHT: 214 LBS | HEART RATE: 60 BPM

## 2019-12-26 DIAGNOSIS — G57.93 NEUROPATHY INVOLVING BOTH LOWER EXTREMITIES: ICD-10-CM

## 2019-12-26 DIAGNOSIS — G57.93 NEUROPATHY INVOLVING BOTH LOWER EXTREMITIES: Primary | ICD-10-CM

## 2019-12-26 PROCEDURE — 72100 X-RAY EXAM L-S SPINE 2/3 VWS: CPT

## 2019-12-26 NOTE — PROGRESS NOTES
Chief Complaint   Patient presents with    Labs     Pt in office today for lasb  -pt states that he has been having issues with his right leg  -pt states from the toes back, he has been having cramping   -pt states that he wakes up at times screaming    1. Have you been to the ER, urgent care clinic since your last visit? Hospital[facility] 2. Have you seen or consulted any other health care providers outside of the [address] [place] [address] since your last visit? Include any pap smears or colon screening.  No     Pt has no other concerns

## 2019-12-26 NOTE — PROGRESS NOTES
HISTORY OF PRESENT ILLNESS  Jada Liao is a 60 y.o. male. HPI  He is here today for follow up right leg pain, cramping sensation that starts in foot and goes up lower leg  No injury  Intermittent for months  Has been on magnesium and potassium long term and not helping  Does have pmh DDD    ROS  A comprehensive review of system was obtained and negative except findings in the HPI    Visit Vitals  /83 (BP 1 Location: Left arm, BP Patient Position: Sitting)   Pulse 60   Temp 98.5 °F (36.9 °C) (Oral)   Resp 16   Ht 5' 10\" (1.778 m)   Wt 214 lb (97.1 kg)   SpO2 98%   BMI 30.71 kg/m²     Physical Exam  Vitals signs and nursing note reviewed. Constitutional:       Appearance: Normal appearance. Cardiovascular:      Rate and Rhythm: Normal rate and regular rhythm. Heart sounds: Normal heart sounds. Pulmonary:      Effort: No respiratory distress. Musculoskeletal:         General: No swelling. Neurological:      Mental Status: He is alert. ASSESSMENT and PLAN  Encounter Diagnoses   Name Primary?  Neuropathy involving both lower extremities Yes     Orders Placed This Encounter    XR SPINE LUMB 2 OR 3 V     Xray ordered of the lumbar spine  Reviewed DDD sx of the lumbar spine and sciatica  Follow up pending report    I have discussed the diagnosis with the patient and the intended plan as seen in the above orders. The patient has received an after-visit summary and questions were answered concerning future plans. Patient conveyed understanding of the plan at the time of the visit.     Yuly Antonio, MSN, ANP  12/26/2019

## 2019-12-28 NOTE — PROGRESS NOTES
Your xray does show some disc disease of the lumbar spine. The best route to take first would be PT to see if they can improve the disc spaces. This does not look to be surgical at this time. I can print an order for PT to  from the desk if you want to go this route.  Amador Tovar

## 2020-03-23 RX ORDER — PANTOPRAZOLE SODIUM 40 MG/1
40 TABLET, DELAYED RELEASE ORAL DAILY
Qty: 30 TAB | Refills: 4 | Status: SHIPPED | OUTPATIENT
Start: 2020-03-23 | End: 2021-03-30 | Stop reason: ALTCHOICE

## 2020-03-23 RX ORDER — LORATADINE 10 MG/1
10 TABLET ORAL DAILY
Qty: 30 TAB | Refills: 11 | Status: SHIPPED | OUTPATIENT
Start: 2020-03-23 | End: 2021-03-18

## 2020-07-31 ENCOUNTER — DOCUMENTATION ONLY (OUTPATIENT)
Dept: FAMILY MEDICINE CLINIC | Age: 65
End: 2020-07-31

## 2020-07-31 NOTE — PROGRESS NOTES
Received faxed medical record release from [facility] for last office note and labs. Copies faxed to [phone] with confirmation received.

## 2020-10-23 RX ORDER — VALACYCLOVIR HYDROCHLORIDE 500 MG/1
TABLET, FILM COATED ORAL
Qty: 8 TAB | Refills: 4 | Status: SHIPPED | OUTPATIENT
Start: 2020-10-23 | End: 2021-03-31

## 2021-03-30 ENCOUNTER — OFFICE VISIT (OUTPATIENT)
Dept: FAMILY MEDICINE CLINIC | Age: 66
End: 2021-03-30
Payer: COMMERCIAL

## 2021-03-30 VITALS
DIASTOLIC BLOOD PRESSURE: 52 MMHG | RESPIRATION RATE: 18 BRPM | TEMPERATURE: 98.3 F | SYSTOLIC BLOOD PRESSURE: 130 MMHG | OXYGEN SATURATION: 98 % | HEIGHT: 70 IN | HEART RATE: 68 BPM | BODY MASS INDEX: 27.2 KG/M2 | WEIGHT: 190 LBS

## 2021-03-30 DIAGNOSIS — Z86.73 HISTORY OF CVA (CEREBROVASCULAR ACCIDENT): Primary | ICD-10-CM

## 2021-03-30 DIAGNOSIS — R53.1 LEFT-SIDED WEAKNESS: ICD-10-CM

## 2021-03-30 PROCEDURE — 99214 OFFICE O/P EST MOD 30 MIN: CPT | Performed by: NURSE PRACTITIONER

## 2021-03-30 RX ORDER — LISINOPRIL 10 MG/1
15 TABLET ORAL DAILY
COMMUNITY
Start: 2021-03-30 | End: 2021-08-30 | Stop reason: ALTCHOICE

## 2021-03-30 RX ORDER — LISINOPRIL 10 MG/1
TABLET ORAL
COMMUNITY
Start: 2021-03-23 | End: 2021-03-30

## 2021-03-30 RX ORDER — GUAIFENESIN 100 MG/5ML
81 LIQUID (ML) ORAL DAILY
COMMUNITY
Start: 2021-03-30

## 2021-03-30 RX ORDER — TRAMADOL HYDROCHLORIDE 50 MG/1
50 TABLET ORAL
COMMUNITY

## 2021-03-30 NOTE — PROGRESS NOTES
Chief Complaint   Patient presents with    Leg Pain    Back Pain     Patient is present in office today for back pain and L leg. Pt states he feels numbness in his left leg and his back. Pt expresses it is starting to start on the Right side. Has been ongoing for about 3-6weeks. Was seen in [address] [place] [address]    1. Have you been to the ER, urgent care clinic since your last visit? Hospitalized since your last visit? No    2. Have you seen or consulted any other health care providers outside of the [address] [place] [address] since your last visit? Include any pap smears or colon screening.  No

## 2021-03-31 ENCOUNTER — APPOINTMENT (OUTPATIENT)
Dept: CT IMAGING | Age: 66
DRG: 068 | End: 2021-03-31
Attending: STUDENT IN AN ORGANIZED HEALTH CARE EDUCATION/TRAINING PROGRAM
Payer: MEDICARE

## 2021-03-31 ENCOUNTER — APPOINTMENT (OUTPATIENT)
Dept: CT IMAGING | Age: 66
DRG: 068 | End: 2021-03-31
Attending: EMERGENCY MEDICINE
Payer: MEDICARE

## 2021-03-31 ENCOUNTER — APPOINTMENT (OUTPATIENT)
Dept: GENERAL RADIOLOGY | Age: 66
DRG: 068 | End: 2021-03-31
Attending: EMERGENCY MEDICINE
Payer: MEDICARE

## 2021-03-31 ENCOUNTER — APPOINTMENT (OUTPATIENT)
Dept: MRI IMAGING | Age: 66
DRG: 068 | End: 2021-03-31
Attending: STUDENT IN AN ORGANIZED HEALTH CARE EDUCATION/TRAINING PROGRAM
Payer: MEDICARE

## 2021-03-31 ENCOUNTER — HOSPITAL ENCOUNTER (INPATIENT)
Age: 66
LOS: 1 days | Discharge: HOME | DRG: 068 | End: 2021-04-01
Attending: EMERGENCY MEDICINE | Admitting: FAMILY MEDICINE
Payer: MEDICARE

## 2021-03-31 DIAGNOSIS — G45.9 TIA (TRANSIENT ISCHEMIC ATTACK): Primary | ICD-10-CM

## 2021-03-31 DIAGNOSIS — F41.9 ANXIETY AND DEPRESSION: ICD-10-CM

## 2021-03-31 DIAGNOSIS — E78.2 MIXED HYPERLIPIDEMIA: ICD-10-CM

## 2021-03-31 DIAGNOSIS — H53.9 VISION CHANGES: ICD-10-CM

## 2021-03-31 DIAGNOSIS — N40.0 BENIGN PROSTATIC HYPERPLASIA, UNSPECIFIED WHETHER LOWER URINARY TRACT SYMPTOMS PRESENT: ICD-10-CM

## 2021-03-31 DIAGNOSIS — I10 ESSENTIAL HYPERTENSION, BENIGN: ICD-10-CM

## 2021-03-31 DIAGNOSIS — R53.83 FATIGUE, UNSPECIFIED TYPE: ICD-10-CM

## 2021-03-31 DIAGNOSIS — F31.9 BIPOLAR 1 DISORDER (HCC): ICD-10-CM

## 2021-03-31 DIAGNOSIS — G47.33 OSA (OBSTRUCTIVE SLEEP APNEA): ICD-10-CM

## 2021-03-31 DIAGNOSIS — D45 POLYCYTHEMIA VERA (HCC): Chronic | ICD-10-CM

## 2021-03-31 DIAGNOSIS — I65.21 SYMPTOMATIC CAROTID ARTERY STENOSIS, RIGHT: ICD-10-CM

## 2021-03-31 DIAGNOSIS — R20.0 NUMBNESS: ICD-10-CM

## 2021-03-31 DIAGNOSIS — F32.A ANXIETY AND DEPRESSION: ICD-10-CM

## 2021-03-31 PROBLEM — I63.9 CVA (CEREBRAL VASCULAR ACCIDENT) (HCC): Status: ACTIVE | Noted: 2021-03-31

## 2021-03-31 LAB
ALBUMIN SERPL-MCNC: 4 G/DL (ref 3.5–5)
ALBUMIN/GLOB SERPL: 1.2 {RATIO} (ref 1.1–2.2)
ALP SERPL-CCNC: 91 U/L (ref 45–117)
ALT SERPL-CCNC: 26 U/L (ref 12–78)
AMPHET UR QL SCN: NEGATIVE
ANION GAP SERPL CALC-SCNC: 5 MMOL/L (ref 5–15)
APPEARANCE UR: CLEAR
APTT PPP: 27.3 SEC (ref 22.1–31)
AST SERPL-CCNC: 21 U/L (ref 15–37)
BARBITURATES UR QL SCN: NEGATIVE
BASOPHILS # BLD: 0.1 K/UL (ref 0–0.1)
BASOPHILS NFR BLD: 1 % (ref 0–1)
BENZODIAZ UR QL: NEGATIVE
BILIRUB SERPL-MCNC: 0.6 MG/DL (ref 0.2–1)
BILIRUB UR QL: NEGATIVE
BUN SERPL-MCNC: 21 MG/DL (ref 6–20)
BUN/CREAT SERPL: 21 (ref 12–20)
CALCIUM SERPL-MCNC: 8.5 MG/DL (ref 8.5–10.1)
CANNABINOIDS UR QL SCN: POSITIVE
CHLORIDE SERPL-SCNC: 110 MMOL/L (ref 97–108)
CHOLEST SERPL-MCNC: 104 MG/DL
CO2 SERPL-SCNC: 23 MMOL/L (ref 21–32)
COCAINE UR QL SCN: NEGATIVE
COLOR UR: NORMAL
COMMENT, HOLDF: NORMAL
CREAT SERPL-MCNC: 0.99 MG/DL (ref 0.7–1.3)
DIFFERENTIAL METHOD BLD: ABNORMAL
DRUG SCRN COMMENT,DRGCM: ABNORMAL
EOSINOPHIL # BLD: 0.1 K/UL (ref 0–0.4)
EOSINOPHIL NFR BLD: 1 % (ref 0–7)
ERYTHROCYTE [DISTWIDTH] IN BLOOD BY AUTOMATED COUNT: 20.9 % (ref 11.5–14.5)
FOLATE SERPL-MCNC: 40.1 NG/ML (ref 5–21)
GLOBULIN SER CALC-MCNC: 3.3 G/DL (ref 2–4)
GLUCOSE SERPL-MCNC: 120 MG/DL (ref 65–100)
GLUCOSE UR STRIP.AUTO-MCNC: NEGATIVE MG/DL
HCT VFR BLD AUTO: 41.4 % (ref 36.6–50.3)
HDLC SERPL-MCNC: 38 MG/DL
HDLC SERPL: 2.7 {RATIO} (ref 0–5)
HGB BLD-MCNC: 12 G/DL (ref 12.1–17)
HGB UR QL STRIP: NEGATIVE
HIV 1+2 AB+HIV1 P24 AG SERPL QL IA: NONREACTIVE
HIV12 RESULT COMMENT, HHIVC: NORMAL
IMM GRANULOCYTES # BLD AUTO: 0 K/UL (ref 0–0.04)
IMM GRANULOCYTES NFR BLD AUTO: 0 % (ref 0–0.5)
INR PPP: 1.1 (ref 0.9–1.1)
KETONES UR QL STRIP.AUTO: NEGATIVE MG/DL
LDLC SERPL CALC-MCNC: 52.8 MG/DL (ref 0–100)
LEUKOCYTE ESTERASE UR QL STRIP.AUTO: NEGATIVE
LIPID PROFILE,FLP: NORMAL
LYMPHOCYTES # BLD: 2 K/UL (ref 0.8–3.5)
LYMPHOCYTES NFR BLD: 20 % (ref 12–49)
MAGNESIUM SERPL-MCNC: 2.3 MG/DL (ref 1.6–2.4)
MCH RBC QN AUTO: 19.4 PG (ref 26–34)
MCHC RBC AUTO-ENTMCNC: 29 G/DL (ref 30–36.5)
MCV RBC AUTO: 67 FL (ref 80–99)
METHADONE UR QL: NEGATIVE
MONOCYTES # BLD: 0.6 K/UL (ref 0–1)
MONOCYTES NFR BLD: 6 % (ref 5–13)
NEUTS SEG # BLD: 7 K/UL (ref 1.8–8)
NEUTS SEG NFR BLD: 72 % (ref 32–75)
NITRITE UR QL STRIP.AUTO: NEGATIVE
NRBC # BLD: 0 K/UL (ref 0–0.01)
NRBC BLD-RTO: 0 PER 100 WBC
OPIATES UR QL: NEGATIVE
PCP UR QL: NEGATIVE
PH UR STRIP: 6 [PH] (ref 5–8)
PHOSPHATE SERPL-MCNC: 3.5 MG/DL (ref 2.6–4.7)
PLATELET # BLD AUTO: 636 K/UL (ref 150–400)
PMV BLD AUTO: 8.6 FL (ref 8.9–12.9)
POTASSIUM SERPL-SCNC: 4.1 MMOL/L (ref 3.5–5.1)
PROT SERPL-MCNC: 7.3 G/DL (ref 6.4–8.2)
PROT UR STRIP-MCNC: NEGATIVE MG/DL
PROTHROMBIN TIME: 11.4 SEC (ref 9–11.1)
RBC # BLD AUTO: 6.18 M/UL (ref 4.1–5.7)
RBC MORPH BLD: ABNORMAL
RBC MORPH BLD: ABNORMAL
SAMPLES BEING HELD,HOLD: NORMAL
SODIUM SERPL-SCNC: 138 MMOL/L (ref 136–145)
SP GR UR REFRACTOMETRY: 1.02 (ref 1–1.03)
THERAPEUTIC RANGE,PTTT: NORMAL SECS (ref 58–77)
TRIGL SERPL-MCNC: 66 MG/DL (ref ?–150)
TROPONIN I SERPL-MCNC: <0.05 NG/ML
TSH SERPL DL<=0.05 MIU/L-ACNC: 1.59 UIU/ML (ref 0.36–3.74)
UR CULT HOLD, URHOLD: NORMAL
UROBILINOGEN UR QL STRIP.AUTO: 0.2 EU/DL (ref 0.2–1)
VIT B12 SERPL-MCNC: 1678 PG/ML (ref 193–986)
VLDLC SERPL CALC-MCNC: 13.2 MG/DL
WBC # BLD AUTO: 9.8 K/UL (ref 4.1–11.1)

## 2021-03-31 PROCEDURE — 65270000029 HC RM PRIVATE

## 2021-03-31 PROCEDURE — 74011250637 HC RX REV CODE- 250/637: Performed by: STUDENT IN AN ORGANIZED HEALTH CARE EDUCATION/TRAINING PROGRAM

## 2021-03-31 PROCEDURE — 70498 CT ANGIOGRAPHY NECK: CPT

## 2021-03-31 PROCEDURE — 36415 COLL VENOUS BLD VENIPUNCTURE: CPT

## 2021-03-31 PROCEDURE — 84443 ASSAY THYROID STIM HORMONE: CPT

## 2021-03-31 PROCEDURE — 82607 VITAMIN B-12: CPT

## 2021-03-31 PROCEDURE — 84100 ASSAY OF PHOSPHORUS: CPT

## 2021-03-31 PROCEDURE — 80061 LIPID PANEL: CPT

## 2021-03-31 PROCEDURE — 99222 1ST HOSP IP/OBS MODERATE 55: CPT | Performed by: FAMILY MEDICINE

## 2021-03-31 PROCEDURE — 85025 COMPLETE CBC W/AUTO DIFF WBC: CPT

## 2021-03-31 PROCEDURE — 99285 EMERGENCY DEPT VISIT HI MDM: CPT

## 2021-03-31 PROCEDURE — 70450 CT HEAD/BRAIN W/O DYE: CPT

## 2021-03-31 PROCEDURE — 81003 URINALYSIS AUTO W/O SCOPE: CPT

## 2021-03-31 PROCEDURE — 83735 ASSAY OF MAGNESIUM: CPT

## 2021-03-31 PROCEDURE — 74011250637 HC RX REV CODE- 250/637: Performed by: PSYCHIATRY & NEUROLOGY

## 2021-03-31 PROCEDURE — 82746 ASSAY OF FOLIC ACID SERUM: CPT

## 2021-03-31 PROCEDURE — 93005 ELECTROCARDIOGRAM TRACING: CPT

## 2021-03-31 PROCEDURE — 85730 THROMBOPLASTIN TIME PARTIAL: CPT

## 2021-03-31 PROCEDURE — A9575 INJ GADOTERATE MEGLUMI 0.1ML: HCPCS | Performed by: RADIOLOGY

## 2021-03-31 PROCEDURE — 84484 ASSAY OF TROPONIN QUANT: CPT

## 2021-03-31 PROCEDURE — 80307 DRUG TEST PRSMV CHEM ANLYZR: CPT

## 2021-03-31 PROCEDURE — 85610 PROTHROMBIN TIME: CPT

## 2021-03-31 PROCEDURE — 80053 COMPREHEN METABOLIC PANEL: CPT

## 2021-03-31 PROCEDURE — 36600 WITHDRAWAL OF ARTERIAL BLOOD: CPT

## 2021-03-31 PROCEDURE — 74011250636 HC RX REV CODE- 250/636: Performed by: RADIOLOGY

## 2021-03-31 PROCEDURE — 99223 1ST HOSP IP/OBS HIGH 75: CPT | Performed by: PSYCHIATRY & NEUROLOGY

## 2021-03-31 PROCEDURE — 74011000636 HC RX REV CODE- 636: Performed by: RADIOLOGY

## 2021-03-31 PROCEDURE — 70553 MRI BRAIN STEM W/O & W/DYE: CPT

## 2021-03-31 PROCEDURE — 87389 HIV-1 AG W/HIV-1&-2 AB AG IA: CPT

## 2021-03-31 RX ORDER — ACETAMINOPHEN 325 MG/1
650 TABLET ORAL
Status: DISCONTINUED | OUTPATIENT
Start: 2021-03-31 | End: 2021-04-01 | Stop reason: HOSPADM

## 2021-03-31 RX ORDER — GUAIFENESIN 100 MG/5ML
162 LIQUID (ML) ORAL
Status: DISCONTINUED | OUTPATIENT
Start: 2021-03-31 | End: 2021-03-31

## 2021-03-31 RX ORDER — LORAZEPAM 2 MG/ML
1 INJECTION INTRAMUSCULAR ONCE
Status: ACTIVE | OUTPATIENT
Start: 2021-03-31 | End: 2021-04-01

## 2021-03-31 RX ORDER — CLOPIDOGREL BISULFATE 75 MG/1
75 TABLET ORAL DAILY
Status: DISCONTINUED | OUTPATIENT
Start: 2021-03-31 | End: 2021-04-01 | Stop reason: HOSPADM

## 2021-03-31 RX ORDER — GUAIFENESIN 100 MG/5ML
81 LIQUID (ML) ORAL DAILY
Status: DISCONTINUED | OUTPATIENT
Start: 2021-04-01 | End: 2021-04-01 | Stop reason: HOSPADM

## 2021-03-31 RX ORDER — ACETAMINOPHEN 650 MG/1
650 SUPPOSITORY RECTAL
Status: DISCONTINUED | OUTPATIENT
Start: 2021-03-31 | End: 2021-04-01 | Stop reason: HOSPADM

## 2021-03-31 RX ORDER — MELATONIN
2000
Status: DISCONTINUED | OUTPATIENT
Start: 2021-03-31 | End: 2021-04-01 | Stop reason: HOSPADM

## 2021-03-31 RX ORDER — LANOLIN ALCOHOL/MO/W.PET/CERES
5000 CREAM (GRAM) TOPICAL DAILY
Status: DISCONTINUED | OUTPATIENT
Start: 2021-04-01 | End: 2021-04-01 | Stop reason: HOSPADM

## 2021-03-31 RX ORDER — LANOLIN ALCOHOL/MO/W.PET/CERES
800 CREAM (GRAM) TOPICAL
COMMUNITY
End: 2021-08-30 | Stop reason: ALTCHOICE

## 2021-03-31 RX ORDER — IBUPROFEN 200 MG
1 TABLET ORAL DAILY
Status: DISCONTINUED | OUTPATIENT
Start: 2021-04-01 | End: 2021-04-01 | Stop reason: HOSPADM

## 2021-03-31 RX ORDER — GADOTERATE MEGLUMINE 376.9 MG/ML
17 INJECTION INTRAVENOUS
Status: COMPLETED | OUTPATIENT
Start: 2021-03-31 | End: 2021-03-31

## 2021-03-31 RX ORDER — LANOLIN ALCOHOL/MO/W.PET/CERES
800 CREAM (GRAM) TOPICAL
Status: DISCONTINUED | OUTPATIENT
Start: 2021-03-31 | End: 2021-04-01 | Stop reason: HOSPADM

## 2021-03-31 RX ORDER — MELATONIN
5000
COMMUNITY

## 2021-03-31 RX ORDER — TAMSULOSIN HYDROCHLORIDE 0.4 MG/1
0.4 CAPSULE ORAL DAILY
Status: DISCONTINUED | OUTPATIENT
Start: 2021-04-01 | End: 2021-04-01 | Stop reason: HOSPADM

## 2021-03-31 RX ORDER — GUAIFENESIN 100 MG/5ML
243 LIQUID (ML) ORAL
Status: COMPLETED | OUTPATIENT
Start: 2021-03-31 | End: 2021-03-31

## 2021-03-31 RX ORDER — VALACYCLOVIR HYDROCHLORIDE 500 MG/1
2000 TABLET, FILM COATED ORAL
COMMUNITY
End: 2021-06-24

## 2021-03-31 RX ORDER — ALLOPURINOL 300 MG/1
300 TABLET ORAL DAILY
Status: DISCONTINUED | OUTPATIENT
Start: 2021-04-01 | End: 2021-04-01 | Stop reason: HOSPADM

## 2021-03-31 RX ORDER — ASPIRIN 81 MG/1
81 TABLET ORAL DAILY
COMMUNITY
End: 2021-04-01

## 2021-03-31 RX ORDER — VALACYCLOVIR HYDROCHLORIDE 500 MG/1
2000 TABLET, FILM COATED ORAL
COMMUNITY
End: 2021-04-21

## 2021-03-31 RX ADMIN — Medication 2000 UNITS: at 17:03

## 2021-03-31 RX ADMIN — IOPAMIDOL 100 ML: 755 INJECTION, SOLUTION INTRAVENOUS at 13:06

## 2021-03-31 RX ADMIN — GADOTERATE MEGLUMINE 17 ML: 376.9 INJECTION INTRAVENOUS at 15:51

## 2021-03-31 RX ADMIN — CLOPIDOGREL BISULFATE 75 MG: 75 TABLET ORAL at 21:17

## 2021-03-31 RX ADMIN — Medication 800 MG: at 17:03

## 2021-03-31 RX ADMIN — ASPIRIN 243 MG: 81 TABLET, CHEWABLE ORAL at 17:03

## 2021-03-31 NOTE — CONSULTS
Neurology Consult - Inpatient      Name:   Yamel Rock  MRN:    [id number]    Date of Admission:  3/31/2021    Date of Consultation:  03/31/21       HISTORY OF PRESENT ILLNESS:     This is a 63 y.o. male with HTN, HLD, polycythemia vera, Vit D Def, Anxiety, Depression, Bipolar I, LAINE, Gout, BPH, GERD, Vertigo, Hx liver disease (1984),     Neurology is asked to see the patient for: Evaluate for CVA. Hx is per patient and chart review. Somewhat hard to gather hx from patient as he seems to jump from one symptom to the other, fast/ pressured type speech. He says that on 3/2/2021 he was in [place] on a fishing trip with friends and had a spell of about 3 to 5 minutes where the vision in his right eye went completely black, no associated pain, no associated headache. He came back to [place] the next day and says he saw an ophthalmologist who could not find any thing wrong with his eye and recommended that he go to [facility].  He says he went to [facility] ER as recommended, may have also had left side numbness or weakness. CT scan of the head was reportedly negative. He was offered admission but he says he refused because he really did not feel like he had a problem and he wanted to get a second opinion from his primary care doctor at the [facility]. He says he left the ER but did start taking aspirin as instructed. He called his PCP at the [facility] the next day discussed the symptoms. Unclear what plan he and [facility], patient then went to see his non-[facility] PCP yesterday and per her note he was reporting 3 to 6-week history of back pain, left leg pain left leg numbness, brain fog, off balance and visual change. Continues to report intermittent left leg numbness that causes him unsteady gait, some numbness in the left upper thoracic back. He says the right visual disturbance has not recurred.   He separately describes a multiyear history of visual aura evaluated in the past by neurology (Dr. Peter Arango). CTA head and neck shows 85 to 90% right ICA stenosis, no left ICA stenosis, vertebral arteries are patent, no aneurysms or significant intracranial stenosis. CT head without acute abnormalities. MRI brain has been performed but report is pending    I reviewed the images via PACS: no onset of recent or remote stroke, no cranial hemorrhage, appears to be normal MRI brain for age. Complete Review of Systems: reviewed on admission H&P    ====================================     Allergies   Allergen Reactions    Sulfa (Sulfonamide Antibiotics) Anaphylaxis, Hives and Unknown (comments)    Sulfur Anaphylaxis, Hives and Seizures    Zyprexa [Olanzapine] Anaphylaxis    Celexa [Citalopram] Other (comments)     groggy    Clindamycin Nausea and Vomiting    Lisinopril Other (comments)     Denies allergy to this medication. Patient states he is currently taking.     Prozac [Fluoxetine] Anxiety    Risperidone Anxiety       Current Outpatient Medications   Medication Instructions    allopurinoL (ZYLOPRIM) 300 mg, Oral, DAILY    aspirin delayed-release 81 mg, Oral, DAILY    aspirin 81 mg, Oral, DAILY    cholecalciferol (VITAMIN D3) 2,000 Units, Oral, DAILY WITH LUNCH    cyanocobalamin (VITAMIN B-12) 5,000 mcg, Oral, DAILY    fluticasone (FLONASE ALLERGY RELIEF) 50 mcg/actuation nasal spray 2 Sprays, Both Nostrils, DAILY AS NEEDED    lisinopriL (PRINIVIL, ZESTRIL) 15 mg, Oral, DAILY    magnesium oxide (MAG-OX) 800 mg, Oral, DAILY WITH LUNCH    tamsulosin (FLOMAX) 0.4 mg capsule TAKE 1 CAPSULE BY MOUTH EVERY DAY    traMADoL (ULTRAM) 50 mg, Oral, EVERY 6 HOURS AS NEEDED    valACYclovir (VALTREX) 2,000 mg, Oral, ONCE PRN, Mouth prodrome     valACYclovir (VALTREX) 2,000 mg, Oral, ONCE PRN, 12 hours after mouth prodrome dose        Current Facility-Administered Medications   Medication Dose Route Frequency Provider Last Rate Last Admin    acetaminophen (TYLENOL) tablet 650 mg  650 mg Oral Q4H PRN [place], MD        Or    acetaminophen (TYLENOL) solution 650 mg  650 mg Per NG tube Q4H PRN Kylah Luna MD        Or    acetaminophen (TYLENOL) suppository 650 mg  650 mg Rectal Q4H PRN Kylah Luna MD        [START ON 4/1/2021] nicotine (NICODERM CQ) 21 mg/24 hr patch 1 Patch  1 Patch TransDERmal DAILY Kylah Luna MD        [START ON 4/1/2021] cyanocobalamin (VITAMIN B12) tablet 5,000 mcg  5,000 mcg Oral DAILY Kylah Luna MD        magnesium oxide (MAG-OX) tablet 800 mg  800 mg Oral DAILY WITH LUNCH Kylah Luna MD   800 mg at 03/31/21 1703    cholecalciferol (VITAMIN D3) (1000 Units /25 mcg) tablet 2,000 Units  2,000 Units Oral DAILY WITH LUNCH Kylah Luna MD   2,000 Units at 03/31/21 1703    [START ON 4/1/2021] aspirin chewable tablet 81 mg  81 mg Oral DAILY Kylah Luna MD        LORazepam (ATIVAN) injection 1 mg  1 mg IntraVENous ONCE Reyna Peres,    Stopped at 03/31/21 1352    [START ON 4/1/2021] tamsulosin (FLOMAX) capsule 0.4 mg  0.4 mg Oral DAILY Kylah Luna MD        [START ON 4/1/2021] allopurinoL (ZYLOPRIM) tablet 300 mg  300 mg Oral DAILY Kylah Luna MD        clopidogreL (PLAVIX) tablet 75 mg  75 mg Oral DAILY Candelaria Downs MD           PSHx  has a past surgical history that includes hx orthopaedic (1970); hx other surgical (2013); hx colonoscopy; and hx cholecystectomy. Socx  reports that he has been smoking. He has a 40.00 pack-year smoking history. He has never used smokeless tobacco. He reports current drug use. Drug: Marijuana. He reports that he does not drink alcohol. FHx family history includes Cancer in his father; Diabetes in his brother; Heart Disease in his father and mother; Kidney Disease in his mother.      PHYSICAL EXAM    .  Patient Vitals for the past 24 hrs:   Temp Pulse Resp BP SpO2   03/31/21 1610 97.6 °F (36.4 °C) 65 14 (!) 151/73 100 %   03/31/21 1215 -- 66 14 (!) 144/56 98 %   03/31/21 1200 -- -- -- (!) 160/69 99 %   03/31/21 1100 -- -- -- 121/74 100 %   03/31/21 1000 -- -- -- (!) 157/87 100 %   03/31/21 0930 -- -- -- (!) 160/72 100 %   03/31/21 0800 -- -- -- (!) 134/58 99 %   03/31/21 0748 -- -- -- -- 100 %   03/31/21 0744 98.5 °F (36.9 °C) 69 19 (!) 172/56 100 %           General: Head: normocephalic, atraumatic. Eyes: conjunctiva clear. Neck: supple. Lungs: not examined. CV: not examined. Extremities: no edema. Skin: No rashes    Neurologic Exam    Mental status: Alert, scattered/ pressure type thoughts. Orientation: Person, place, situation. Speech: no aphasia, no dysarthria    Cranial Nerves:  CN 1: not tested. CN 2: PERRL, Visual Fields normal bilaterally. Funduscopic exam: not performed. CN 3/ CN 4/ CN 6: EOMI, No ELAINE, No ptosis. CN 5: intact LT V1-2-3 on right; intact LT V1-2-3 on left. CN 7: symmetri. CN 8: normal hearing. CN 9/ CN 10: not examined. CN 11: shrug is symmetric.  CN 12: not examined    Motor: 5/5 in all exts    Sensory: intact LT, prick in all exts; no spinal sensory level on either side. Cerebellar: no rest, postural, or intention tremor. Normal FNF bilateral.  DTRs: 1+ biceps and patellars. Plantar response: not tested. Gait: normal, including tandem. Romberg: not tested      Labs/ Radiology     Labs: TSH 1.59 urine drug screen positive for THC, UA negative, INR 1.1, PTT 11.4, CBC with mild anemia hemoglobin 12.0 hematocrit 41.4 elevated platelet count of 793,434, CMP with mild elevated Leukos 120, normal creatinine 0.99, normal LFTs    Imaging:    Ct Head Wo Cont    Result Date: 3/31/2021  No acute intracranial hemorrhage, mass or infarct. Cta Head Neck W Cont    Result Date: 3/31/2021  1. No acute large vessel occlusion or dissection. 2. Focal, severe greater than 80% stenosis proximal right internal carotid artery. Assessment/ Plan       ICD-10-CM ICD-9-CM    1.  TIA (transient ischemic attack)  G45.9 435.9 2. Vision changes  H53.9 368.9    3. Numbness  R20.0 782.0        1) Hx of amaurosis fugax right eye on 3-  2) Severe right carotid stenosis; no significant stenosis on left  3) Intermittent left leg weakness/ numbness  4) MRI Brain: Report is pending. To my view, no evidence of recent stroke; appears to be normal brain MRI for age    Dx: TIA (intermittent left leg numbness/ weakness)    Continue aspirin 81 mg a day    Added Plavix 75 mg a day (first dose tonight)    Due to severity of right ICA stenosis, recommend allowing SBP up to 170, DBP up to 85-90 to ensure adequate cerebral perfusion    Vascular surgery has been consulted by admitting team to see the patient regarding the ICA stenosis    Lipid panel pending; if LDL is > 70, recommend starting Lipitor (40 mg QHS)    No driving x 3 months after TIA, per DMV regulation    No additional inpatient neurology workup planned. Will s/o. Follow up in Neurology Clinic 4 weeks after discharge. Thank you for asking the Neurology Service to evaluate Leana Pour.       Signed By: Jeanmarie Mukherjee MD     March 31, 2021

## 2021-03-31 NOTE — PROGRESS NOTES
[address] [facility] RESIDENCY PROGRAM  PROGRESS NOTE     4/1/2021  PCP: Shailesh Castaneda MD     Assessment/Plan:     Pippa Muniz is a 63 y.o. male w/ PMH of HTN, HLD, polycythemia vera, Vit D Def, Anxiety, Depression, Bipolar I, LAINE, Gout, BPH, GERD,of who is admitted for CVA/TIA r/o.    24 hour events: none    CVA/TIA rule out: Chronic symptoms of intermittent R blurry vision, tingling in L fingers, L back numbness and L Leg spasms w/ weakness for the past 1.5 months. CT head neg for acute processes, MRI brain wnl. CTA head/neck w/ >80% stenosis of proximal R internal carotid artery. EKG sinus bianca w/ sinus arrhythmia, (unchanged form prior EKG at [address] [facility]). TSH 1.59, B12 1678, folate 40, HIV neg. UDS THC+. Trop neg x3  -Per neuro: appreciate recs   -ASA 81mg daily, plavix 75mg daily   -Continue permissive HTN w/ SBP up to 170, DBP 85-90 due to R ICA stenosis   -No additional workup at this time, plan to f/u OP in 4wks  -Vascular consulted - currently NPO  -Echo pending  -Consulted PT/OT for rehab eval, CM for disposition planning      Proximal R internal carotid artery stenosis: CTA head/neck w/ 80% stenosis. -Pt received ASA and plavix yesterday, is not yet out of the window for possible surgical intervention  -Per vascular surgery pt can f/u OP - appreciate recs  -Start lipitor 40mg QHS    Chronic Muscle Spasms and Numbness: intermittent per pt. States H/O started Mag Ox 800mg daily for cramps. Used to take Tramadol but states no longer does. Neurologically intact on exam.   -Cont home Mag Ox 800mg daily  -Tylenol prn   -CMP, Mag daily      Unintentional Weight loss: States was 195 lbs last week and last night was 183 lbs. Atributes weight loss to poor appetite and wife who travels frequently has not been home to cook him meals regularly like before. Denies bloody stools/abdominal pain/constipation. Does have an extensive smoking Hx.  CXR unremarkable.   -Counseled on smoking cessation   -Recommend Low Dose CT annually      Hypertension: On admission BP was 172/56. Normally taking lisinopril 15mg daily.   -Holding lisinopril for permissive HTN  -Will continue to monitor at this time and readjust as BP's trend.     Hyperlipidemia: Lipid panel 3/31 w/ Tchol 104, HDL 38, LDL 53, tri 66. No home meds   -Start lipitor 40mg QHS due to elevated ASCVD risk in the setting of R ICA stenosis     Polycythemia Vera: Hgb on Admission 12 (BL 13). Sees H/O at [facility] (Dr. Sonu Doll) and gets phlebotomy 1x month per pt (per charts, every 2 wks)  -Daily CBC  -Cont OP H/O w/ phlebotomy      Tobacco Dependence: endorses a little more than 1/2 ppd x >50yrs.   -Nicotine patch      Marijuana Use: endorses daiy marijuana due to intermittent muscle spasm/cramps use but states last time 4 days ago. UDS THC+.     B12 Deficiency: Pt endorses taking supplement for years but unsure if it was initiated due to deficiency. Endorses compliance w/ supplements. B12 1678 (high). -Cont B12 supplement     Vit D Def: cont supplements daily     Psych Hx: Anxiety, Depression, Bipolar Dep: no home meds. Pt states he is not convinced he has Bipolar I disorder  -F/u OP     BPH: Flomax 0.4mg daily     LAINE: states his LAINE has resolved since he lost weight recently. No home CPAP  -F/u OP      GERD: stable. Used to take ppi but states he no longer needs it.     Gout: cont Allopurinol 300mg daily         FEN/GI - NPO. Activity - Ambulate as tolerated  DVT prophylaxis - SCDs pending possible procedure   GI prophylaxis - Not indicated at this time  Fall prophylaxis - Fall precautions ordered. Code Status - Partial. Discussed with patient / caregivers. Next of Irlanda 71 Name and Contact - wife, Mary Quiles ([phone])       Brit Hayes MD  Family Medicine Resident    Erasto Nj discussed with Dr. Lavell Sandra. Subjective:   Pt was seen and examined at bedside. Afebrile and hemodynamically stable.  Had an episode where he felt like his R hand was tingling overnight, also continuing to complain of L leg cramps. Denies chest pain, SOB, nausea, vomiting, abdominal pain, dizziness. Objective:   Physical examination  Patient Vitals for the past 24 hrs:   Temp Pulse Resp BP SpO2   21 0750 97.5 °F (36.4 °C) (!) 56 17 138/72 99 %   21 0700 -- (!) 54 -- -- --   21 0330 98.9 °F (37.2 °C) 66 16 132/66 99 %   21 0000 97.8 °F (36.6 °C) 68 16 138/68 98 %   21 1935 97.5 °F (36.4 °C) 70 16 (!) 140/70 99 %   21 1610 97.6 °F (36.4 °C) 65 14 (!) 151/73 100 %   21 1215 -- 66 14 (!) 144/56 98 %   21 1200 -- -- -- (!) 160/69 99 %   21 1100 -- -- -- 121/74 100 %   21 1000 -- -- -- (!) 157/87 100 %   21 0930 -- -- -- (!) 160/72 100 %      Temp (24hrs), Av.9 °F (36.6 °C), Min:97.5 °F (36.4 °C), Max:98.9 °F (37.2 °C)         O2 Device: Room air    Date 21 - 21 - 21 0659   Shift  24 Hour Total 1900-0659 24 Hour Total   INTAKE   P.O. 240 300 540 0  0     P. O. 240 300 540 0  0   I. V.(mL/kg/hr) 0(0) 0(0) 0(0) 0  0     I.V. 0 0 0 0  0   Blood  0 0 0  0     Autotransfused  0 0 0  0   Other  0 0 0  0     Other  0 0 0  0   Shift Total(mL/kg) 240(2.9) 300(3.6) 540(6.5) 0(0)  0(0)   OUTPUT   Urine(mL/kg/hr)  0(0) 0(0) 0  0     Urine Voided  0 0 0  0     Urine Occurrence(s) 2 x 1 x 3 x 1 x  1 x   Emesis/NG output  0 0 0  0     Emesis  0 0 0  0     Emesis Occurrence(s) 0 x 0 x 0 x 0 x  0 x   Other  0 0 0  0     Other Output  0 0 0  0   Stool  0 0 0  0     Stool Occurrence(s) 1 x 0 x 1 x 1 x  1 x     Stool  0 0 0  0   Blood  0 0 0  0     Quantitative Blood Loss  0 0 0  0     Blood  0 0 0  0   Shift Total(mL/kg)  0(0) 0(0) 0(0)  0(0)    300 540 0  0   Weight (kg) 82.9 82.9 82.9 82.9 82.9 82.9     General: No acute distress. Alert. Cooperative. Head: Normocephalic. Atraumatic. Eyes:             Conjunctiva pink. Sclera white. PERRL.    Ears: Ear canals patent. TM non-erythematous. Nose:             Septum midline. Mucosa pink. No drainage. Throat: Mucosa pink. Moist mucous membranes. No tonsillar exudates or erythema. Palate movement equal bilaterally. Neck: Supple. Normal ROM. No stiffness. Respiratory: CTAB. No w/r/r/c.   Cardiovascular: RRR. Normal S1,S2. No m/r/g. Pulses 2+ throughout. GI: + bowel sounds. Nontender. No rebound tenderness or guarding. Nondistended. Extremities: No edema. No palpable cord. No tenderness. Musculoskeletal: Full ROM in all extremities. Neuro: CN II-XII intact. Strength 5/5 in all extremities. Sensation intact in all extremities. Skin: Clear. No rashes. No ulcers. : Deferred   Rectal: Deferred         Data Review:     CBC:  Recent Labs     04/01/21  0502 03/31/21  0935   WBC 9.6 9.8   HGB 12.5 12.0*   HCT 43.6 41.4   * 954*     Metabolic Panel:  Recent Labs     04/01/21  0502 03/31/21  0935    138   K 4.4 4.1    110*   CO2 28 23   BUN 20 21*   CREA 1.01 0.99   * 120*   CA 8.7 8.5   MG 2.5* 2.3   PHOS 3.3 3.5   ALB 4.1 4.0   TBILI 0.8 0.6   ALT 27 26   INR  --  1.1     Micro:  Lab Results   Component Value Date/Time    Culture result: NO SIGNIFICANT GROWTH 11/09/2014 03:20 PM     Imaging:  Ct Head Wo Cont    Result Date: 3/31/2021  INDICATION: vision changes. Numbness. Exam: Noncontrast CT of the brain is performed with 5 mm collimation. CT dose reduction was achieved with the use of the standardized protocol tailored for this examination and automatic exposure control for dose modulation. FINDINGS: There is no acute intracranial hemorrhage, mass, mass effect or herniation. Ventricular system is normal. The gray-white matter differentiation is well-preserved. The mastoid air cells are well pneumatized. There is a 2.4 cm incompletely visualized left maxillary sinus mucus retention cyst. The visualized paranasal sinuses are otherwise clear.      No acute intracranial hemorrhage, mass or infarct. Cta Head Neck W Cont    Result Date: 3/31/2021  INDICATION: CVA r/o EXAMINATION:  CT ANGIOGRAPHY HEAD AND NECK COMPARISON: None TECHNIQUE:  Following the uneventful administration of iodinated contrast material, axial CT angiography of the head and neck was performed. Delayed axial images through the head were also obtained. Coronal and sagittal reconstructions were obtained. Manual postprocessing of images was performed. 3-D  Sagittal maximal intensity projection images were obtained. 3-D Coronal maximal intensity projections were obtained. CT dose reduction was achieved through use of a standardized protocol tailored for this examination and automatic exposure control for dose modulation. FINDINGS: CTA NECK: Aortic Arch: No significant abnormality. Right Common Carotid Artery: No significant abnormality. Right Internal Carotid Artery: Moderate calcified plaque at the origin, and focal severe stenosis of the proximal cervical segment, approximately 14 mm beyond the origin where the lumen narrows to less than 0.5 mm relative to the mid and distal cervical segment of 3.7 mm. NASCET Right: 85-90% or greater. Left Common Carotid Artery: No significant abnormality. Left Internal Carotid Artery: Mild calcific plaque, without significant stenosis. NASCET Left: 0-25%. Carotid stenosis determined using NASCET criteria. Right Vertebral Artery: No significant abnormality. Left Vertebral Artery: No significant abnormality. Cervical Soft Tissues: Right supraclavicular lymph nodes measure up to 14 x 10 mm. Scattered subcentimeter lymph nodes in the neck. Lung Apices: No significant abnormality. Bones: No destructive bone lesion. Additional Comments: N/A. CTA HEAD: Posterior Circulation: No flow limiting stenosis or occlusion. Anterior Circulation: Mild calcific atherosclerosis bilateral cavernous internal carotid artery segments. No flow-limiting stenosis.  Anterior and middle cerebral arteries are patent. Additional Comments: No evidence of aneurysm or vascular malformation. Note: Perfusion imaging not performed. 1. No acute large vessel occlusion or dissection. 2. Focal, severe greater than 80% stenosis proximal right internal carotid artery. Medications reviewed  Current Facility-Administered Medications   Medication Dose Route Frequency    atorvastatin (LIPITOR) tablet 40 mg  40 mg Oral QHS    acetaminophen (TYLENOL) tablet 650 mg  650 mg Oral Q4H PRN    Or    acetaminophen (TYLENOL) solution 650 mg  650 mg Per NG tube Q4H PRN    Or    acetaminophen (TYLENOL) suppository 650 mg  650 mg Rectal Q4H PRN    nicotine (NICODERM CQ) 21 mg/24 hr patch 1 Patch  1 Patch TransDERmal DAILY    cyanocobalamin (VITAMIN B12) tablet 5,000 mcg  5,000 mcg Oral DAILY    magnesium oxide (MAG-OX) tablet 800 mg  800 mg Oral DAILY WITH LUNCH    cholecalciferol (VITAMIN D3) (1000 Units /25 mcg) tablet 2,000 Units  2,000 Units Oral DAILY WITH LUNCH    aspirin chewable tablet 81 mg  81 mg Oral DAILY    tamsulosin (FLOMAX) capsule 0.4 mg  0.4 mg Oral DAILY    allopurinoL (ZYLOPRIM) tablet 300 mg  300 mg Oral DAILY    clopidogreL (PLAVIX) tablet 75 mg  75 mg Oral DAILY         Signed:   Omar Carter MD   Resident, Family Medicine      Attending note: Attending note to follow. ..

## 2021-03-31 NOTE — ED NOTES
TRANSFER - OUT REPORT:    Verbal report given to lani BEVERLY(name) on Julisa Mathur  being transferred to [facility] (unit) for routine progression of care       Report consisted of patients Situation, Background, Assessment and   Recommendations(SBAR). Information from the following report(s) SBAR, ED Summary, [facility] and Recent Results was reviewed with the receiving nurse. Lines:   Peripheral IV 03/31/21 Right Hand (Active)   Site Assessment Clean, dry, & intact 03/31/21 1019   Infiltration Assessment 0 03/31/21 1019   Dressing Status Clean, dry, & intact 03/31/21 1019   Hub Color/Line Status Flushed 03/31/21 1019       Peripheral IV 03/31/21 Right Forearm (Active)   Site Assessment Clean, dry, & intact 03/31/21 1020   Phlebitis Assessment 0 03/31/21 1020   Infiltration Assessment 0 03/31/21 1020   Dressing Status Clean, dry, & intact 03/31/21 1020        Opportunity for questions and clarification was provided.       Patient transported with:   [employer]

## 2021-03-31 NOTE — ED PROVIDER NOTES
Mr. Dorisann Prader is a 61yo male who presents to the ER with complaints of right sided vision changes and left back numbness. He states that his symptoms have been intermittent for the last 6 weeks. He has them almost every day. His symptoms today started approximate 530 this morning. He had gotten up at 4 AM and did not have any symptoms. He describes feeling like his entire vision went blurry in his right eye. He also had numbness in his left back. No numbness in his arms or legs. He does report having intermittent numbness in his eye over the last month. He also reports having intermittent numbness and weakness in his left leg. He said that when his left leg feels weak and numb, he has difficulty walking and he feels off balance. He was seen at the beginning of the month at an outside hospital.  He signed out AMA in the middle of a stroke work-up. He saw his PCP yesterday. He was instructed to return to the ER if he had any recurrent symptoms. He reports a full feeling in his ears. He reports some occasional chest pain. He denies other complaints.            Past Medical History:   Diagnosis Date    Depression     GERD (gastroesophageal reflux disease)     Hypertension     Liver disease 1984    Fatty liver    Other ill-defined conditions(799.89)     gallstones    Polycythemia vera(238.4) 4/29/2013    Prostatitis     Sleep apnea 12/2011    doesn't use c-pap;  lost 25 lbs and has improved    Vertigo        Past Surgical History:   Procedure Laterality Date    HX CHOLECYSTECTOMY      HX COLONOSCOPY      HX ORTHOPAEDIC  1970    left wrist compound fracture    HX OTHER SURGICAL  2013    molar removal (dental)         Family History:   Problem Relation Age of Onset    Cancer Father     Heart Disease Father     Heart Disease Mother     Kidney Disease Mother     Diabetes Brother        Social History     Socioeconomic History    Marital status:      Spouse name: Not on file    Number of children: Not on file    Years of education: Not on file    Highest education level: Not on file   Occupational History    Not on file   Social Needs    Financial resource strain: Not on file    Food insecurity     Worry: Not on file     Inability: Not on file    Transportation needs     Medical: Not on file     Non-medical: Not on file   Tobacco Use    Smoking status: Current Every Day Smoker     Packs/day: 1.00     Years: 40.00     Pack years: 40.00    Smokeless tobacco: Never Used   Substance and Sexual Activity    Alcohol use: No     Alcohol/week: 0.0 standard drinks    Drug use: Yes     Types: Marijuana     Comment: rare    Sexual activity: Never   Lifestyle    Physical activity     Days per week: Not on file     Minutes per session: Not on file    Stress: Not on file   Relationships    Social connections     Talks on phone: Not on file     Gets together: Not on file     Attends Druze service: Not on file     Active member of club or organization: Not on file     Attends meetings of clubs or organizations: Not on file     Relationship status: Not on file    Intimate partner violence     Fear of current or ex partner: Not on file     Emotionally abused: Not on file     Physically abused: Not on file     Forced sexual activity: Not on file   Other Topics Concern    Not on file   Social History Narrative    Not on file         ALLERGIES: Sulfa (sulfonamide antibiotics), Sulfur, Zyprexa [olanzapine], Celexa [citalopram], Clindamycin, Lisinopril, Prozac [fluoxetine], and Risperidone    Review of Systems   Constitutional: Negative for chills and fever. HENT: Negative for rhinorrhea and sore throat. Eyes: Positive for visual disturbance. Respiratory: Negative for cough and shortness of breath. Cardiovascular: Positive for chest pain. Gastrointestinal: Negative for abdominal pain, diarrhea, nausea and vomiting. Genitourinary: Negative for dysuria and hematuria.    Musculoskeletal: Negative for arthralgias and myalgias. Skin: Negative for pallor and rash. Neurological: Positive for weakness and numbness. Negative for dizziness and light-headedness. Difficulty walking   All other systems reviewed and are negative. Vitals:    03/31/21 0744 03/31/21 0748   BP: (!) 172/56    Pulse: 69    Resp: 19    Temp: 98.5 °F (36.9 °C)    SpO2: 100% 100%   Weight: 86.3 kg (190 lb 3.2 oz)    Height: 5' 10\" (1.778 m)             Physical Exam     Vital signs reviewed. Nursing notes reviewed. Const:  No acute distress, well developed, well nourished  Head:  Atraumatic, normocephalic  Eyes:  PERRL, conjunctiva normal, no scleral icterus  Neck:  Supple, trachea midline  Cardiovascular: Regular rate   resp:  No resp distress, no increased work of breathing  Abd:  Soft, non-tender, non-distended, no rebound, no guarding  MSK:  No pedal edema, normal ROM  Neuro:  Alert and oriented x3, no cranial nerve defect, equal strength in upper and lower extremities  Skin:  Warm, dry, intact  Psych: normal mood and affect, behavior is normal, judgement and thought content is normal          MDM  Number of Diagnoses or Management Options     Amount and/or Complexity of Data Reviewed  Clinical lab tests: ordered and reviewed  Tests in the radiology section of CPT®: ordered and reviewed  Review and summarize past medical records: yes    Patient Progress  Patient progress: stable          Perfect Serve Consult for Admission  10:40 AM    ED Room Number: ER07/07  Patient Name and age:  Inés Oliver 63 y.o.  male  Working Diagnosis:   1. TIA (transient ischemic attack)    2. Vision changes    3. Numbness        COVID-19 Suspicion:  no  Sepsis present:  no  Reassessment needed: no  Readmission: no  Isolation Requirements:  no  Recommended Level of Care:  telemetry  Department:[facility] ED - [phone]        Mr. Narciso Joshua is a 63yo male who presents to the ER with complaints of intermittent numbness and visual changes. He does sometimes have leg weakness as well, but he did not have that today. His sx had resolved PTA. However, his sx have been present almost every day. Pt.  To be evaluated for admission by the family medicine team.        Procedures

## 2021-03-31 NOTE — ROUTINE PROCESS
Bedside shift change report given to Jason Cruz RN (oncoming nurse) by Conrad Hood RN (offgoing nurse). Report included the following information SBAR, Kardex, ED Summary, Procedure Summary, Intake/Output, MAR, Accordion, Recent Results, Med Rec Status and Cardiac Rhythm Sinus Tyron Johnston.

## 2021-03-31 NOTE — ED NOTES
Patient refusing chest x-ray- states, \"I had one earlier in the month so I'm not paying for another one. \" Dr Santi Cohen made aware.

## 2021-03-31 NOTE — PROGRESS NOTES
HISTORY OF PRESENT ILLNESS  Angel Kaur is a 65 y.o. male.  HPI  Patient is present in office today for back pain and L leg.   Pt states he feels numbness in his left leg and his back.   Pt expresses it is starting to start on the Right side.   Has been ongoing for about 3-6weeks.   Denies injury    Was seen in [facility] weeks ago  Had discharge papers with him to show that he actually went to the ER for left sided weakness and vision change  CT head was negative but presumed TIA  He was advised to be admitted for doppler and echo to finish work up for CVA  He left AMA because he did not believe their dx  He continues to have brain fog, feel off balance and have vision changes  Did start ASA 81mg - 2 tabs a day    Also admits that he had pcp visit at [facility] just yesterday that the MD there also told him same that he was likely having a stroke; did not feel MRI was warranted and advised needed additional work up.  Here today to hear a 3rd opinion on what could be going on.    ROS  A comprehensive review of system was obtained and negative except findings in the HPI    Visit Vitals  BP (!) 130/52 (BP 1 Location: Left arm, BP Patient Position: Sitting, BP Cuff Size: Adult)   Pulse 68   Temp 98.3 °F (36.8 °C) (Oral)   Resp 18   Ht 5' 10\" (1.778 m)   Wt 190 lb (86.2 kg)   SpO2 98%   BMI 27.26 kg/m²     Physical Exam  Vitals signs and nursing note reviewed.   Constitutional:       Appearance: Normal appearance. He is ill-appearing.   Cardiovascular:      Rate and Rhythm: Normal rate and regular rhythm.      Heart sounds: Normal heart sounds.   Pulmonary:      Breath sounds: Normal breath sounds.   Neurological:      Mental Status: He is alert.      Coordination: Coordination abnormal.      Gait: Gait abnormal.      Comments: He seemed very confused, telling the story he repeated himself quite a bit; I had to repeat my opinion several times; he kept stating that his head just did not feel right and that something was  wrong         ASSESSMENT and PLAN  Encounter Diagnoses   Name Primary?  History of CVA (cerebrovascular accident) Yes    Left-sided weakness      Orders Placed This Encounter    REFERRAL TO NEUROLOGY    DISCONTD: lisinopriL (PRINIVIL, ZESTRIL) 10 mg tablet    traMADoL (ULTRAM) 50 mg tablet    aspirin 81 mg chewable tablet    lisinopriL (PRINIVIL, ZESTRIL) 10 mg tablet     Agreed to carotid doppler and echo  Referral to neuro as well for eval  Med list updated  Must go to ER if worsens    I have discussed the diagnosis with the patient and the intended plan as seen in the above orders. The patient has received an after-visit summary and questions were answered concerning future plans. Patient conveyed understanding of the plan at the time of the visit.     Mairbel Meyers, MSN, ANP  3/30/2021

## 2021-03-31 NOTE — H&P
[facility]   Admission H&P    Date of admission: 3/31/2021    Patient name: Mi Chavez  MRN: [id number]  YOB: 1955  Age: 63 y.o. Primary care provider:  Kaiser Agudelo MD     Source of Information: patient, medical records    Chief complaint: R blurry vision, tingling in L fingers, L back numbness and L Leg spasms w/ weakness     History of Present Illness  Mi Chavez is a 63 y.o. male with PMH of HTN, HLD, polycythemia vera, Vit D Def, Anxiety, Depression, Bipolar I, LAINE, Gout, BPH, GERD who presents to the ED complaining of intermittent R blurry vision, tingling in L fingers, L back numbness and L Leg spasms w/ weakness for the past 1.5 months. Today, his symptoms began at 5:30am but since his admission here, he is back to baseline. Pt saw his PCP yesterday for his complaints and recommeneded the pt to return to ED if symptoms return. His PCP has scheduled his w/ a Neurology appt w/ Dr. Kunal Sawyer (4/19) along w/ head imaging. 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 Opthomalogy at the time told him to Visit [address] [place] [address]. He eventually left AMA during his admission because he felt \"it was not an emergency because it has been going on for a while now\". He was being worked up for CVA (records personally reviewed: neg CT head, neg CXR and EKG w/ sinus bianca w/ rate 57). Next week after that, he saw his H/O Psycician (Dr. Kathy Beach at [facility]) who per pt, did not believe his symptoms are from a CVA but likely 2/2 light sensitivity, however, H/O started him on ASA 81mg daily. The pt has been using 450Watt lights for his gardening project; he has been gardening daily. Of note, per chart review, pt was seen by Dr. Eric Chew in the past (5/2018) for similar complaints \"on-going head numbness associated w/ R eye lillie since 80s). He states his symptoms comes in [place]".  He currently denies CP/palp/SOB/Headache/dizziness/visual changes/saddle anaesthesia/ urinary or bowel incontinence/fever/chills/tick bites. He also states he tested negative for COVID on 3/2/21 during his time at [address] [place] [address] and received his 1st vaccine on 3/19. In the ED:  Vitals: Temp 98.5  /56   HR 69   RR 19   SatO2  100 % on RA  Labs: Hgb 12 (BL 13), PLTs 636 (BL 600s), Trop neg x1, PTT, PT/INR wnl, UA unremarkable   Imaging: CT head w/ incidental 2.4cm L maxillary sinus cyst incompletely visualized, unremarkable otherwise. Treatment: none. Neurology was not consulted    EKG: sinus bradycardia w/ sinus arrhythmia, rate 58 (unchanged form prior EKG at [address] [place] [address])    Patient Screening for COVID-19: Negative   1) Patient denies complaints of shortness of breath or difficulty breathing, sore throat, chills, fatigue, muscle aches, loss of taste or smell, or GI symptoms(nausea, vomiting or diarrhea): Yes  2) Patient denies complaints of cough or fever over 100F: Yes  3) Patient denies leaving the country in the past 14 days or any other recent travel: Yes  4) Patient denies being exposed to anyone with COVID-19 and has not been around any one that has been sick with COVID-19 like symptoms as listed above: Yes     Home Medications   Prior to Admission medications    Medication Sig Start Date End Date Taking? Authorizing Provider   traMADoL (ULTRAM) 50 mg tablet Take 50 mg by mouth every six (6) hours as needed for Pain. Provider, Historical   aspirin 81 mg chewable tablet Take 2 Tabs by mouth daily. 3/30/21   Amandeep Cesar NP   lisinopriL (PRINIVIL, ZESTRIL) 10 mg tablet Take 1.5 Tabs by mouth daily.  3/30/21   Amandeep Cesar NP   valACYclovir (VALTREX) 500 mg tablet TAKE 4 TABLETS BY MOUTHIN PRODROME AND 4 TABLETS 12 HOURS LATER 10/23/20   Jordan Altamirano MD   Vitamin D3 50 mcg (2,000 unit) tab TAKE 1 TABLET BY MOUTH EVERY DAY 8/31/20   Jordan Altamirano MD   tamsulosin (FLOMAX) 0.4 mg capsule TAKE 1 CAPSULE BY MOUTH EVERY DAY 8/31/20   Deondre Lemon MD   allopurinol (ZYLOPRIM) 300 mg tablet Take 1 Tab by mouth daily. 12/9/19   Deondre Lemon MD   magnesium oxide (MAG-OX) 400 mg tablet Take 2 Tabs by mouth daily. 11/26/19   Deondre Lemon MD   cyanocobalamin (VITAMIN B-12) 1,000 mcg sublingual tablet Take 5,000 mcg by mouth daily. Provider, Historical   fluticasone (FLONASE ALLERGY RELIEF) 50 mcg/actuation nasal spray 2 Sprays by Both Nostrils route daily. Provider, Historical       Allergies   Allergies   Allergen Reactions    Sulfa (Sulfonamide Antibiotics) Anaphylaxis, Hives and Unknown (comments)    Sulfur Anaphylaxis, Hives and Seizures    Zyprexa [Olanzapine] Anaphylaxis    Celexa [Citalopram] Other (comments)     groggy    Clindamycin Nausea and Vomiting    Lisinopril Other (comments)     Denies allergy to this medication. Patient states he is currently taking.     Prozac [Fluoxetine] Anxiety    Risperidone Anxiety       Past Medical History:   Diagnosis Date    Depression     GERD (gastroesophageal reflux disease)     Hypertension     Liver disease 1984    Fatty liver    Other ill-defined conditions(799.89)     gallstones    Polycythemia vera(238.4) 4/29/2013    Prostatitis     Sleep apnea 12/2011    doesn't use c-pap;  lost 25 lbs and has improved    Vertigo        Past Surgical History:   Procedure Laterality Date    HX CHOLECYSTECTOMY      HX COLONOSCOPY      HX ORTHOPAEDIC  1970    left wrist compound fracture    HX OTHER SURGICAL  2013    molar removal (dental)       Family History   Problem Relation Age of Onset    Cancer Father     Heart Disease Father     Heart Disease Mother     Kidney Disease Mother     Diabetes Brother      Social History   Patient resides    Independently    X  With family care      Assisted living      SNF      Ambulates  X  Independently      With cane       Assisted walker           Alcohol history   X  None     Social     Chronic     Smoking history None     Former smoker   X  Current smoker (endorses a little more than 1/2 ppd x >50yrs)     Social History     Tobacco Use   Smoking Status Current Every Day Smoker    Packs/day: 1.00    Years: 40.00    Pack years: 40.00   Smokeless Tobacco Never Used       Drug history    None     Former drug user   X  Current drug user (endorses daiy marijuana use but states last time 4 days)       Code status    Full code     DNR/DNI   X  Partial (no intubation)   Code status discussed with the patient/caregivers. Review of Systems  Review of Systems   Constitutional: Negative for chills, fever and weight loss. HENT: Negative for congestion and ear pain. Eyes: Positive for blurred vision. Negative for photophobia. Respiratory: Negative for cough and shortness of breath. Cardiovascular: Negative for chest pain, palpitations and leg swelling. Gastrointestinal: Negative for abdominal pain, constipation and diarrhea. Genitourinary: Negative for dysuria. Musculoskeletal:        Muscle spasms at L back, LLE   Skin: Negative for rash. Neurological: Positive for sensory change and weakness. Negative for dizziness, tremors and headaches. Psychiatric/Behavioral: Positive for depression and substance abuse. Negative for hallucinations and suicidal ideas. The patient is nervous/anxious. Physical Exam  Visit Vitals  BP (!) 172/56 (BP 1 Location: Left upper arm, BP Patient Position: At rest)   Pulse 69   Temp 98.5 °F (36.9 °C)   Resp 19   Ht 5' 10\" (1.778 m)   Wt 190 lb 3.2 oz (86.3 kg)   SpO2 100%   BMI 27.29 kg/m²        General: No acute distress. Alert. Cooperative. Head: Normocephalic. Atraumatic. Eyes:  Conjunctiva pink. Sclera white. PERRL. Ears:  Ear canals patent. TM non-erythematous. Nose:  Septum midline. Mucosa pink. No drainage. Throat: Mucosa pink. Moist mucous membranes. No tonsillar exudates or erythema. Palate movement equal bilaterally. Neck: Supple. Normal ROM. No stiffness. Respiratory: CTAB. No w/r/r/c.   Cardiovascular: RRR. Normal S1,S2. No m/r/g. Pulses 2+ throughout. GI: + bowel sounds. Nontender. No rebound tenderness or guarding. Nondistended. Extremities: No edema. No palpable cord. No tenderness. Musculoskeletal: Full ROM in all extremities. Neuro: CN II-XII grossly intact. Strength 5/5 in all extremities. Sensation intact in all extremities. DTRs 2+ throughout. Skin: Clear. No rashes. No ulcers. : Deferred   Rectal: Deferred       Laboratory Data  Recent Results (from the past 24 hour(s))   METABOLIC PANEL, COMPREHENSIVE    Collection Time: 03/31/21  9:35 AM   Result Value Ref Range    Sodium 138 136 - 145 mmol/L    Potassium 4.1 3.5 - 5.1 mmol/L    Chloride 110 (H) 97 - 108 mmol/L    CO2 23 21 - 32 mmol/L    Anion gap 5 5 - 15 mmol/L    Glucose 120 (H) 65 - 100 mg/dL    BUN 21 (H) 6 - 20 MG/DL    Creatinine 0.99 0.70 - 1.30 MG/DL    BUN/Creatinine ratio 21 (H) 12 - 20      GFR est AA >60 >60 ml/min/1.73m2    GFR est non-AA >60 >60 ml/min/1.73m2    Calcium 8.5 8.5 - 10.1 MG/DL    Bilirubin, total 0.6 0.2 - 1.0 MG/DL    ALT (SGPT) 26 12 - 78 U/L    AST (SGOT) 21 15 - 37 U/L    Alk. phosphatase 91 45 - 117 U/L    Protein, total 7.3 6.4 - 8.2 g/dL    Albumin 4.0 3.5 - 5.0 g/dL    Globulin 3.3 2.0 - 4.0 g/dL    A-G Ratio 1.2 1.1 - 2.2     CBC WITH AUTOMATED DIFF    Collection Time: 03/31/21  9:35 AM   Result Value Ref Range    WBC 9.8 4.1 - 11.1 K/uL    RBC 6.18 (H) 4.10 - 5.70 M/uL    HGB 12.0 (L) 12.1 - 17.0 g/dL    HCT 41.4 36.6 - 50.3 %    MCV 67.0 (L) 80.0 - 99.0 FL    MCH 19.4 (L) 26.0 - 34.0 PG    MCHC 29.0 (L) 30.0 - 36.5 g/dL    RDW 20.9 (H) 11.5 - 14.5 %    PLATELET 445 (H) 452 - 400 K/uL    MPV 8.6 (L) 8.9 - 12.9 FL    NRBC 0.0 0  WBC    ABSOLUTE NRBC 0.00 0.00 - 0.01 K/uL    NEUTROPHILS PENDING %    LYMPHOCYTES PENDING %    MONOCYTES PENDING %    EOSINOPHILS PENDING %    BASOPHILS PENDING %    IMMATURE GRANULOCYTES PENDING %    ABS.  NEUTROPHILS PENDING K/UL    ABS. LYMPHOCYTES PENDING K/UL    ABS. MONOCYTES PENDING K/UL    ABS. EOSINOPHILS PENDING K/UL    ABS. BASOPHILS PENDING K/UL    ABS. IMM. GRANS. PENDING K/UL    DF PENDING    PROTHROMBIN TIME + INR    Collection Time: 03/31/21  9:35 AM   Result Value Ref Range    INR 1.1 0.9 - 1.1      Prothrombin time 11.4 (H) 9.0 - 11.1 sec   PTT    Collection Time: 03/31/21  9:35 AM   Result Value Ref Range    aPTT 27.3 22.1 - 31.0 sec    aPTT, therapeutic range     58.0 - 77.0 SECS   URINALYSIS W/ RFLX MICROSCOPIC    Collection Time: 03/31/21  9:35 AM   Result Value Ref Range    Color YELLOW/STRAW      Appearance CLEAR CLEAR      Specific gravity 1.024 1.003 - 1.030      pH (UA) 6.0 5.0 - 8.0      Protein Negative NEG mg/dL    Glucose Negative NEG mg/dL    Ketone Negative NEG mg/dL    Bilirubin Negative NEG      Blood Negative NEG      Urobilinogen 0.2 0.2 - 1.0 EU/dL    Nitrites Negative NEG      Leukocyte Esterase Negative NEG     TROPONIN I    Collection Time: 03/31/21  9:35 AM   Result Value Ref Range    Troponin-I, Qt. <0.05 <0.05 ng/mL   SAMPLES BEING HELD    Collection Time: 03/31/21  9:35 AM   Result Value Ref Range    SAMPLES BEING HELD 1sst,1rd     COMMENT        Add-on orders for these samples will be processed based on acceptable specimen integrity and analyte stability, which may vary by analyte. URINE CULTURE HOLD SAMPLE    Collection Time: 03/31/21  9:35 AM    Specimen: Serum   Result Value Ref Range    Urine culture hold        Urine on hold in Microbiology dept for 2 days. If unpreserved urine is submitted, it cannot be used for addtional testing after 24 hours, recollection will be required. Assessment and Plan     Yokasta Hoffmann is a 63 y.o. male w/ PMH of HTN, HLD, polycythemia vera, Vit D Def, Anxiety, Depression, Bipolar I, LAINE, Gout, BPH, GERD,of who is admitted for CVA/TIA r/o.     CVA/TIA rule out: Chronic symptoms of intermittent R blurry vision, tingling in L fingers, L back numbness and L Leg spasms w/ weakness for the past 1.5 months. CT head neg for acute processes. EKG sinus bradycardia w/ sinus arrhythmia, rate 58 (unchanged form prior EKG at [facility]). - Admit to telemetry w/ cardiac monitoring   - VS per unit protocol  - Permissive HTN for the first 24-48 hours SBP<220 and DBP<110  (Prn hydralazine). - Start  mg Daily   - MRI brain w wo cont  - CTA head and neck  - Follow up labs: Mg, phos, TSH, A1c, UDS, B12, folate, HIV  - Trend trops x2 more times   - Echocardiogram with bubble studies  - Fasting Lipid panel (LDL goal <70)  - Blood sugar goal 140-180, HgbA1C  - Consulted PT/OT for rehab eval, CM for disposition planning and Speech for swallow evaluation   - Neuro check q4h, telemetry at least 24 hours  - Neuro consult, appreciate recs  - NPO pending bedside swallow eval, then reg diet     Chronic Muscle Spasms and Numbness: intermittent per pt. States H/O started Mag Ox 800mg daily for cramps. Used to take Tramadol but states no longer does. Neurologically intact on exam.   - cont Mag Ox  - Tylenol prn   - Mag now and daily   - CMP, Mag daily     Unintentional Weight loss: States was 195 lbs last week and last night was 183 lbs. Atributes weight loss to poor appetite and wife who travels frequently has not been home to cook him meals regularly like before. Denies bloody stools/abdominal pain/constipation. Does have an extensive smoking Hx. CXR unremarkable. - Counseled on smoking cessation   - Recommend Low Dose CT annually     Hypertension: On admission BP was 172/56. Normally taking lisinopril 15mg daily. - holding lisinopril for permissive HTN  - Will continue to monitor at this time and readjust as BP's trend. Hyperlipidemia: Last lipid panel .  No home meds   Lab Results   Component Value Date/Time    Cholesterol, total 133 08/30/2019 10:44 AM    HDL Cholesterol 34 (L) 08/30/2019 10:44 AM    LDL-CHOLESTEROL 78 08/30/2019 10:44 AM    LDL, calculated 61 11/11/2017 11:24 AM    VLDL, calculated 30 11/11/2017 11:24 AM    Triglyceride 127 08/30/2019 10:44 AM    Cholesterol/HDL ratio 3.9 08/30/2019 10:44 AM   - Lipid panel  - Start statin prn     Polycythemia Vera: Hgb on Admission 12 (BL 13). Sees H/O at [facility] (Dr. Freddy Simmons) and gets phlebotomy 1x month per pt (per charts, every 2 wks)  - Daily CBC  - cont OP H/O w/ phlebotomy     Tobacco Dependence: endorses a little more than 1/2 ppd x >50yrs. - nicotine patch     Marijuana Use: endorses daiy marijuana due to intermittent muscle spasm/cramps use but states last time 4 days ago. - UDS    B12 Deficiency: Pt endorses taking supplement for years but unsure if it was initiated due to deficiency. Endorses compliance w/ supplements  - cont B12 supplement    Vit D Def: cont supplements daily    Psych Hx: Anxiety, Depression, Bipolar Dep: no home meds. Pt states he is not convinced he has Bipolar I disorder  - F/u OP    BPH: Flomax 0.4mg daily    LAINE: states his LAINE has resolved since he lost weight recently. No home CPAP  - F/u OP     GERD: stable. Used to take ppi but states he no longer needs it. Gout: cont Allopurinol 300mg daily       FEN/GI - NPO. until passes bedside swallow eval   Activity - Ambulate as tolerated  DVT prophylaxis - SCDs pending possible procedure   GI prophylaxis - Not indicated at this time  Fall prophylaxis - Fall precautions ordered. Disposition - Admit to Telemetry. Plan to d/c to Home. Consulting PT, OT and CM  Code Status - Partial. Discussed with patient / caregivers. Next of Kin Name and Contact - wife, Sunday Dee ([phone])        Patient Kevin Herr will be discussed with Dr. Andressa Sanches.     Gabriella Arguelles MD  [employer] Problems  Date Reviewed: 12/26/2019    None

## 2021-03-31 NOTE — ED NOTES
CT reports the patient is refusing his CT scan- Dr Gwendolyn Andrew made aware and reports he will go speak to the patient

## 2021-03-31 NOTE — PROGRESS NOTES
[facility]: MED RECONCILIATION      Medications adjusted:  Flonase  Administration time of magnesium and vitamin d3  Aspirin  Valtrex    Information obtained from: Patient (alert, oriented, reliable), Rxquery (data available)      Allergies: Sulfa (sulfonamide antibiotics), Sulfur, Zyprexa [olanzapine], Celexa [citalopram], Clindamycin, Lisinopril, Prozac [fluoxetine], and Risperidone    Prior to Admission Medications:     Medication Documentation Review Audit       Reviewed by Apolinar Kaplan, PHARMD (Pharmacist) on 03/31/21 at [phone]      Medication Sig Documenting Provider Last Dose Status Taking?   allopurinol (ZYLOPRIM) 300 mg tablet Take 1 Tab by mouth daily. Reyes Maddox MD  Active Yes   aspirin 81 mg chewable tablet Take 81 mg by mouth daily. Faye Coulter NP  Active Yes           Med Note (Haydee Shah. Wed Mar 31, 2021 11:45 AM) Pt takes EC 81mg tab and chewable tab. Total dose is 162mg   aspirin delayed-release 81 mg tablet Take 81 mg by mouth daily. Provider, Historical  Active Yes           Med Note (Haydee Shah. Wed Mar 31, 2021 11:58 AM) Pt takes EC 81mg tab and chewable tab. Total dose is 162mg   cholecalciferol (Vitamin D3) (1000 Units /25 mcg) tablet Take 2,000 Units by mouth daily (with lunch). Provider, Historical  Active Yes   cyanocobalamin (VITAMIN B-12) 1,000 mcg sublingual tablet Take 5,000 mcg by mouth daily. Provider, Historical  Active Yes   fluticasone (FLONASE ALLERGY RELIEF) 50 mcg/actuation nasal spray 2 Sprays by Both Nostrils route daily as needed. Provider, Historical  Active Yes   lisinopriL (PRINIVIL, ZESTRIL) 10 mg tablet Take 1.5 Tabs by mouth daily. Faye Coulter NP  Active Yes           Med Note (Haydee Nice Wed Mar 31, 2021 11:46 AM) Takes 1.5 tabs   magnesium oxide (MAG-OX) 400 mg tablet Take 800 mg by mouth daily (with lunch).  Provider, Historical  Active Yes   tamsulosin (FLOMAX) 0.4 mg capsule TAKE 1 CAPSULE BY MOUTH EVERY DAY Bridget Rivka Rodriguez MD  Active Yes   traMADoL (ULTRAM) 50 mg tablet Take 50 mg by mouth every six (6) hours as needed for Pain. Provider, Historical  Active Yes   valACYclovir (Valtrex) 500 mg tablet Take 2,000 mg by mouth once as needed. Mouth prodrome Provider, Historical  Active Yes   valACYclovir (Valtrex) 500 mg tablet Take 2,000 mg by mouth once as needed. 12 hours after mouth prodrome dose Provider, Historical  Active Yes                      Deven Linares Muss

## 2021-03-31 NOTE — ED TRIAGE NOTES
Patient arrives via EMS for multiple intermittent complaints including R eye visual changes, left side numbness and tingling which began 6 weeks ago.

## 2021-04-01 ENCOUNTER — APPOINTMENT (OUTPATIENT)
Dept: NON INVASIVE DIAGNOSTICS | Age: 66
DRG: 068 | End: 2021-04-01
Attending: STUDENT IN AN ORGANIZED HEALTH CARE EDUCATION/TRAINING PROGRAM
Payer: MEDICARE

## 2021-04-01 VITALS
SYSTOLIC BLOOD PRESSURE: 146 MMHG | DIASTOLIC BLOOD PRESSURE: 82 MMHG | BODY MASS INDEX: 26.05 KG/M2 | HEART RATE: 71 BPM | TEMPERATURE: 97.9 F | RESPIRATION RATE: 18 BRPM | HEIGHT: 70 IN | OXYGEN SATURATION: 98 % | WEIGHT: 182 LBS

## 2021-04-01 LAB
ALBUMIN SERPL-MCNC: 4.1 G/DL (ref 3.5–5)
ALBUMIN/GLOB SERPL: 1.2 {RATIO} (ref 1.1–2.2)
ALP SERPL-CCNC: 90 U/L (ref 45–117)
ALT SERPL-CCNC: 27 U/L (ref 12–78)
ANION GAP SERPL CALC-SCNC: 2 MMOL/L (ref 5–15)
AST SERPL-CCNC: 17 U/L (ref 15–37)
ATRIAL RATE: 58 BPM
BILIRUB SERPL-MCNC: 0.8 MG/DL (ref 0.2–1)
BUN SERPL-MCNC: 20 MG/DL (ref 6–20)
BUN/CREAT SERPL: 20 (ref 12–20)
CALCIUM SERPL-MCNC: 8.7 MG/DL (ref 8.5–10.1)
CALCULATED P AXIS, ECG09: 67 DEGREES
CALCULATED R AXIS, ECG10: 46 DEGREES
CALCULATED T AXIS, ECG11: 53 DEGREES
CHLORIDE SERPL-SCNC: 108 MMOL/L (ref 97–108)
CO2 SERPL-SCNC: 28 MMOL/L (ref 21–32)
CREAT SERPL-MCNC: 1.01 MG/DL (ref 0.7–1.3)
DIAGNOSIS, 93000: NORMAL
ECHO AO ROOT DIAM: 3.33 CM
ECHO AV AREA PEAK VELOCITY: 2.18 CM2
ECHO AV AREA/BSA PEAK VELOCITY: 1.1 CM2/M2
ECHO AV PEAK GRADIENT: 12.32 MMHG
ECHO AV PEAK VELOCITY: 175.51 CM/S
ECHO EST RA PRESSURE: 3 MMHG
ECHO LA AREA 4C: 23.67 CM2
ECHO LA MAJOR AXIS: 2.76 CM
ECHO LA MINOR AXIS: 1.38 CM
ECHO LA VOL 2C: 61.86 ML (ref 18–58)
ECHO LA VOL 4C: 68.79 ML (ref 18–58)
ECHO LA VOL BP: 75.52 ML (ref 18–58)
ECHO LA VOL/BSA BIPLANE: 37.66 ML/M2 (ref 16–28)
ECHO LA VOLUME INDEX A2C: 30.85 ML/M2 (ref 16–28)
ECHO LA VOLUME INDEX A4C: 34.3 ML/M2 (ref 16–28)
ECHO LV E' LATERAL VELOCITY: 15.16 CENTIMETER/SECOND
ECHO LV E' SEPTAL VELOCITY: 12.3 CENTIMETER/SECOND
ECHO LV INTERNAL DIMENSION DIASTOLIC: 4.7 CM (ref 4.2–5.9)
ECHO LV INTERNAL DIMENSION SYSTOLIC: 3.26 CM
ECHO LV IVSD: 0.88 CM (ref 0.6–1)
ECHO LV MASS 2D: 137.5 G (ref 88–224)
ECHO LV MASS INDEX 2D: 68.6 G/M2 (ref 49–115)
ECHO LV POSTERIOR WALL DIASTOLIC: 0.87 CM (ref 0.6–1)
ECHO LVOT DIAM: 1.94 CM
ECHO LVOT PEAK GRADIENT: 6.74 MMHG
ECHO LVOT PEAK VELOCITY: 129.79 CM/S
ECHO MV A VELOCITY: 54.49 CENTIMETER/SECOND
ECHO MV E DECELERATION TIME (DT): 270.49 MS
ECHO MV E VELOCITY: 89.82 CENTIMETER/SECOND
ECHO RIGHT VENTRICULAR SYSTOLIC PRESSURE (RVSP): 28.2 MMHG
ECHO RV INTERNAL DIMENSION: 3.96 CM
ECHO RV TAPSE: 2.88 CM (ref 1.5–2)
ECHO TV REGURGITANT MAX VELOCITY: 251.01 CM/S
ECHO TV REGURGITANT PEAK GRADIENT: 25.2 MMHG
ERYTHROCYTE [DISTWIDTH] IN BLOOD BY AUTOMATED COUNT: 20.9 % (ref 11.5–14.5)
EST. AVERAGE GLUCOSE BLD GHB EST-MCNC: 108 MG/DL
GLOBULIN SER CALC-MCNC: 3.3 G/DL (ref 2–4)
GLUCOSE SERPL-MCNC: 101 MG/DL (ref 65–100)
HBA1C MFR BLD: 5.4 % (ref 4–5.6)
HCT VFR BLD AUTO: 43.6 % (ref 36.6–50.3)
HGB BLD-MCNC: 12.5 G/DL (ref 12.1–17)
LA VOL DISK BP: 68.94 ML (ref 18–58)
MAGNESIUM SERPL-MCNC: 2.5 MG/DL (ref 1.6–2.4)
MCH RBC QN AUTO: 19.4 PG (ref 26–34)
MCHC RBC AUTO-ENTMCNC: 28.7 G/DL (ref 30–36.5)
MCV RBC AUTO: 67.6 FL (ref 80–99)
NRBC # BLD: 0 K/UL (ref 0–0.01)
NRBC BLD-RTO: 0 PER 100 WBC
P-R INTERVAL, ECG05: 142 MS
PHOSPHATE SERPL-MCNC: 3.3 MG/DL (ref 2.6–4.7)
PLATELET # BLD AUTO: 619 K/UL (ref 150–400)
PMV BLD AUTO: 8.6 FL (ref 8.9–12.9)
POTASSIUM SERPL-SCNC: 4.4 MMOL/L (ref 3.5–5.1)
PROT SERPL-MCNC: 7.4 G/DL (ref 6.4–8.2)
Q-T INTERVAL, ECG07: 454 MS
QRS DURATION, ECG06: 76 MS
QTC CALCULATION (BEZET), ECG08: 445 MS
RBC # BLD AUTO: 6.45 M/UL (ref 4.1–5.7)
SODIUM SERPL-SCNC: 138 MMOL/L (ref 136–145)
VENTRICULAR RATE, ECG03: 58 BPM
WBC # BLD AUTO: 9.6 K/UL (ref 4.1–11.1)

## 2021-04-01 PROCEDURE — 74011250637 HC RX REV CODE- 250/637: Performed by: STUDENT IN AN ORGANIZED HEALTH CARE EDUCATION/TRAINING PROGRAM

## 2021-04-01 PROCEDURE — 83735 ASSAY OF MAGNESIUM: CPT

## 2021-04-01 PROCEDURE — 99238 HOSP IP/OBS DSCHRG MGMT 30/<: CPT | Performed by: FAMILY MEDICINE

## 2021-04-01 PROCEDURE — 80053 COMPREHEN METABOLIC PANEL: CPT

## 2021-04-01 PROCEDURE — 83036 HEMOGLOBIN GLYCOSYLATED A1C: CPT

## 2021-04-01 PROCEDURE — 85027 COMPLETE CBC AUTOMATED: CPT

## 2021-04-01 PROCEDURE — 36415 COLL VENOUS BLD VENIPUNCTURE: CPT

## 2021-04-01 PROCEDURE — 96374 THER/PROPH/DIAG INJ IV PUSH: CPT

## 2021-04-01 PROCEDURE — 97165 OT EVAL LOW COMPLEX 30 MIN: CPT | Performed by: OCCUPATIONAL THERAPIST

## 2021-04-01 PROCEDURE — 74011250637 HC RX REV CODE- 250/637: Performed by: PSYCHIATRY & NEUROLOGY

## 2021-04-01 PROCEDURE — 84100 ASSAY OF PHOSPHORUS: CPT

## 2021-04-01 PROCEDURE — 97161 PT EVAL LOW COMPLEX 20 MIN: CPT

## 2021-04-01 RX ORDER — ATORVASTATIN CALCIUM 40 MG/1
40 TABLET, FILM COATED ORAL
Qty: 30 TAB | Refills: 0 | Status: SHIPPED | OUTPATIENT
Start: 2021-04-01 | End: 2021-05-01

## 2021-04-01 RX ORDER — ATORVASTATIN CALCIUM 20 MG/1
40 TABLET, FILM COATED ORAL
Status: DISCONTINUED | OUTPATIENT
Start: 2021-04-01 | End: 2021-04-01 | Stop reason: HOSPADM

## 2021-04-01 RX ORDER — CLOPIDOGREL BISULFATE 75 MG/1
75 TABLET ORAL DAILY
Qty: 30 TAB | Refills: 0 | Status: SHIPPED | OUTPATIENT
Start: 2021-04-02 | End: 2021-05-02

## 2021-04-01 RX ADMIN — TAMSULOSIN HYDROCHLORIDE 0.4 MG: 0.4 CAPSULE ORAL at 08:45

## 2021-04-01 RX ADMIN — CLOPIDOGREL BISULFATE 75 MG: 75 TABLET ORAL at 08:45

## 2021-04-01 RX ADMIN — CYANOCOBALAMIN TAB 500 MCG 5000 MCG: 500 TAB at 08:45

## 2021-04-01 RX ADMIN — Medication 800 MG: at 11:32

## 2021-04-01 RX ADMIN — ASPIRIN 81 MG: 81 TABLET, CHEWABLE ORAL at 08:45

## 2021-04-01 RX ADMIN — Medication 2000 UNITS: at 11:32

## 2021-04-01 NOTE — PROGRESS NOTES
D/C Order Noted-    Pt ambulating in vargas- family to drive pt home at time of discharge. No CM needs identified.      Erik Schwab, MSW, [address]

## 2021-04-01 NOTE — PROGRESS NOTES
Stroke Education provided to patient and the following topics were discussed    1. Patients personal risk factors for stroke are smoking, family history and Other Polycythemia Vera    2. Warning signs of Stroke:        * Sudden numbness or weakness of the face, arm or leg, especially on one side of          The body            * Sudden confusion, trouble speaking or understanding        * Sudden trouble seeing in one or both eyes        * Sudden trouble walking, dizziness, loss of balance or coordination        * Sudden severe headache with no known cause      3. Importance of activation Emergency Medical Services ( 9-1-1 ) immediately if experience any warning signs of stroke. 4. Be sure and schedule a follow-up appointment with your primary care doctor or any specialists as instructed. 5. You must take medicine every day to treat your risk factors for stroke. Be sure to take your medicines exactly as your doctor tells you: no more, no less. Know what your medicines are for , what they do. Anti-thrombotics /anticoagulants can help prevent strokes. You are taking the following medicine(s)  Asprin Plavix Lipitor (educational material provided for Plavix and Lipitor)     6. Smoking and second-hand smoke greatly increase your risk of stroke, cardiovascular disease and death. Smoking history. 7. Information provided was [facility] Stroke Education Binder    8. Documentation of teaching completed in Patient Education Activity and on Care Plan with teaching response noted?   yes

## 2021-04-01 NOTE — PROGRESS NOTES
OCCUPATIONAL THERAPY EVALUATION/DISCHARGE  Patient: Krystin Green (81 y.o. male)  Date: 4/1/2021  Primary Diagnosis: CVA (cerebral vascular accident) [facility]) [I63.9]       Precautions:        ASSESSMENT  Based on the objective data described below, the patient presents without complaints at this time other than need to talk to MD re: meds and waiting to talk to vascular surgeon re: right ICA stenosis. At this time overall independent and reports his symptoms wax and wane. At this time no further needs. Current Level of Function (ADLs/self-care): independent    Functional Outcome Measure: The patient scored 95/100 on the Barthel outcome measure  Other factors to consider for discharge: ? sx     PLAN :  Recommend with staff: up ad melody    Recommendation for discharge: (in order for the patient to meet his/her long term goals)  No skilled occupational therapy/ follow up rehabilitation needs identified at this time.     This discharge recommendation:  Has been made in collaboration with the attending provider and/or case management    IF patient discharges home will need the following DME: none       SUBJECTIVE:   Patient stated I get worse as the day goes on.    OBJECTIVE DATA SUMMARY:   HISTORY:   Past Medical History:   Diagnosis Date    Depression     GERD (gastroesophageal reflux disease)     Hypertension     Liver disease 1984    Fatty liver    Other ill-defined conditions(799.89)     gallstones    Polycythemia vera(238.4) 4/29/2013    Prostatitis     Sleep apnea 12/2011    doesn't use c-pap;  lost 25 lbs and has improved    Vertigo      Past Surgical History:   Procedure Laterality Date    HX CHOLECYSTECTOMY      HX COLONOSCOPY      HX ORTHOPAEDIC  1970    left wrist compound fracture    HX OTHER SURGICAL  2013    molar removal (dental)       Prior Level of Function/Environment/Context: independent  Expanded or extensive additional review of patient history:   Home Situation  Home Environment: Private residence  Living Alone: No  Support Systems: Spouse/Significant Other/Partner, Other (comments)(reports spouse works out of town)  Current DME Used/Available at Home: [address] [facility] [place], straight    Hand dominance: Right    EXAMINATION OF PERFORMANCE DEFICITS:  Cognitive/Behavioral Status:  Neurologic State: Alert  Orientation Level: Oriented X4  Cognition: Follows commands; Appropriate for age attention/concentration; Appropriate safety awareness  Perception: Appears intact  Perseveration: Perseverates during conversation  Safety/Judgement: Awareness of environment; Fall prevention;Home safety; Insight into deficits    Skin: intact    Edema: none noted    Hearing: Auditory  Auditory Impairment: None    Vision/Perceptual:                                     Range of Motion:  AROM: Within functional limits  PROM: Within functional limits                      Strength:  Strength: Within functional limits                Coordination:  Coordination: Within functional limits  Fine Motor Skills-Upper: Left Intact; Right Intact    Gross Motor Skills-Upper: Left Intact; Right Intact    Tone & Sensation:  Tone: Normal  Sensation: Impaired(reports fluctuation, intact at time of assessment)                      Balance:  Sitting: Intact; Without support  Standing: Intact; Without support    Functional Mobility and Transfers for ADLs:  Bed Mobility:  Rolling: Independent  Supine to Sit: Independent  Sit to Supine: Independent  Scooting: Independent    Transfers:  Sit to Stand: Independent  Stand to Sit: Independent  Bed to Chair: Independent  Bathroom Mobility: Independent  Toilet Transfer : Independent    ADL Assessment:  Feeding: Independent    Oral Facial Hygiene/Grooming: Independent         Upper Body Dressing: Independent    Lower Body Dressing: Independent    Toileting: Independent                ADL Intervention and task modifications:        educated on role of OT, fall prevention    Patient instructed and indicated understanding the benefits of maintaining activity tolerance, functional mobility, and independence with self care tasks during acute stay  to ensure safe return home and to baseline. Encouraged patient to increase frequency and duration OOB, be out of bed for all meals, perform daily ADLs (as approved by RN/MD regarding bathing etc), and performing functional mobility to/from bathroom. Cognitive Retraining  Safety/Judgement: Awareness of environment; Fall prevention;Home safety; Insight into deficits       Functional Measure:  Barthel Index:    Bathin  Bladder: 10  Bowels: 10  Groomin  Dressing: 10  Feeding: 10  Mobility: 15  Stairs: 10  Toilet Use: 10  Transfer (Bed to Chair and Back): 10  Total: 95/100        The Barthel ADL Index: Guidelines  1. The index should be used as a record of what a patient does, not as a record of what a patient could do. 2. The main aim is to establish degree of independence from any help, physical or verbal, however minor and for whatever reason. 3. The need for supervision renders the patient not independent. 4. A patient's performance should be established using the best available evidence. Asking the patient, friends/relatives and nurses are the usual sources, but direct observation and common sense are also important. However direct testing is not needed. 5. Usually the patient's performance over the preceding 24-48 hours is important, but occasionally longer periods will be relevant. 6. Middle categories imply that the patient supplies over 50 per cent of the effort. 7. Use of aids to be independent is allowed. Miranda Tran., Barthel, D.W. ([id number]). Functional evaluation: the Barthel Index. [address] [facility] (14)2. Gerry uLgo naila TANI Quiroz, Michelle Garcia., Jamie Luo., Mariano, [address] [facility] (). Measuring the change indisability after inpatient rehabilitation; comparison of the responsiveness of the Barthel Index and Functional [place] Measure.  Journal of Neurology, Neurosurgery, and Psychiatry, 66(4), 514-08. BETO Rivers, JAIR Motley, & Adrian Wilkinson M.A. (2004.) Assessment of post-stroke quality of life in cost-effectiveness studies: The usefulness of the Barthel Index and the EuroQoL-5D. Quality of Life Research, 15, 330-11         Occupational Therapy Evaluation Charge Determination   History Examination Decision-Making   LOW Complexity : Brief history review  LOW Complexity : 1-3 performance deficits relating to physical, cognitive , or psychosocial skils that result in activity limitations and / or participation restrictions  LOW Complexity : No comorbidities that affect functional and no verbal or physical assistance needed to complete eval tasks       Based on the above components, the patient evaluation is determined to be of the following complexity level: LOW   Pain Rating:  No complaint    Activity Tolerance:   Good    After treatment patient left in no apparent distress:    Sitting in chair and Call bell within reach    COMMUNICATION/EDUCATION:   The patients plan of care was discussed with: Physical therapist and Registered nurse.      Thank you for this referral.  EVERETTE Sarkar/CIRO  Time Calculation: 10 mins

## 2021-04-01 NOTE — PROGRESS NOTES
Bedside and Verbal shift change report given to RUSH Fountain (oncoming nurse) by Viri Willams (offgoing nurse). Report included the following information SBAR, Kardex, Intake/Output, MAR, Recent Results and Med Rec Status.

## 2021-04-01 NOTE — PROGRESS NOTES
AVS reviewed with pt, pt verbalizes understanding of medication changes and follow up appointments, denies further questions at this time. PIV removed, tele removed. Belongings gathered at bedside. Will be transported to main entrance when ride is available.

## 2021-04-01 NOTE — DISCHARGE SUMMARY
[address] [place] [address]   Office [phone]  Fax [phone]       Discharge / Transfer / Off-Service Note     Name: Andrez Baldwin MRN: [id number]  Sex: Male   YOB: 1955  Age: 63 y.o. PCP: Deondre Lemon MD     Date of admission: 3/31/2021  Date of discharge/transfer: 4/1/2021    Attending physician at admission: Peggy President. Attending physician at discharge/transfer: Davidson Carrillo MD  Resident physician at discharge/transfer: Hitesh , MD     Consultants during hospitalization  IP CONSULT TO NEUROLOGY  IP CONSULT TO VASCULAR SURGERY     Admission diagnoses   CVA (cerebral vascular accident) [facility]) [I63.9]    Recommended follow-up after discharge    1. PCP-Susie Gill MD  2. Vascular Surgery - Theron Almaraz MD  3. Neurology - Jessica Winter NP  4. Heme/onc [address]     Things to follow up on with PCP:   -Monitor BP readings - per neurology pt should allow permissive HTN up to 624C systolic and 84-04 diastolic in order to maintain cerebral perfusion due to ICA stenosis  -lisinopril held at discharge - can restart once seen and cleared by neuro  -monitor pt after starting lipitor, check CMP to monitor LFTs  -f/u neuro symptoms, extremity tingling  -Schedule annual low-dose CT to monitor for lung cancer  -ensure that pt has f/u scheduled w/ vascular surgery for evaluation of ICA stenosis  -Final echo results   ----------------------------------------------------------------------------------------------------------------------------  The patient was well enough to be discharged from the hospital. However, because they were inpatient in a hospital, they are at greater risk of having been exposed to the coronavirus.  PLEASE stay inside and self-quarantine for 14 days to prevent further spread of the coronavirus.  ------------------------------------------------------------------------------------------------------------------    Medication Changes:  START   -lipitor 40mg QHS  -plavix 75mg daily     STOP   -lisinopril 15mg daily     CHANGES   -reduce ASA to 81mg daily instead of 162mg daily     No other changes were made to your medications, please take all your other home medicines as previously prescribed. History of Present Illness    As per admitting provider:     David Hashimoto is a 63 y.o. male with PMH of HTN, HLD, polycythemia vera, Vit D Def, Anxiety, Depression, Bipolar I, LAINE, Gout, BPH, GERD who presents to the ED complaining of intermittent R blurry vision, tingling in L fingers, L back numbness and L Leg spasms w/ weakness for the past 1.5 months. Today, his symptoms began at 5:30am but since his admission here, he is back to baseline. Pt saw his PCP yesterday for his complaints and recommeneded the pt to return to ED if symptoms return. His PCP has scheduled his w/ a Neurology appt w/ Dr. Rubén Doshi (4/19) along w/ head imaging.      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 Opthomalogy at the time told him to Visit [address] [place] [address]. He eventually left A during his admission because he felt \"it was not an emergency because it has been going on for a while now\". He was being worked up for CVA (records personally reviewed: neg CT head, neg CXR and EKG w/ sinus bianca w/ rate 57). Next week after that, he saw his H/O Psycician (Dr. Claudean Brunt at [facility]) who per pt, did not believe his symptoms are from a CVA but likely 2/2 light sensitivity, however, H/O started him on ASA 81mg daily. The pt has been using 450Watt lights for his gardening project; he has been gardening daily.      Of note, per chart review, pt was seen by Dr. Norma Kaplan in the past (5/2018) for similar complaints \"on-going head numbness associated w/ R eye lillie since 80s).     He states [place]".  He currently denies CP/palp/SOB/Headache/dizziness/visual changes/saddle anaesthesia/ urinary or bowel incontinence/fever/chills/tick bites.      He also states he tested negative for COVID on 3/2/21 during his time at [address] [place] [address] and received his 1st vaccine on 3/19.         In the ED:  Vitals: Temp 98.5  /56   HR 69   RR 19   SatO2  100 % on RA  Labs: Hgb 12 (BL 13), PLTs 636 (BL 600s), Trop neg x1, PTT, PT/INR wnl, UA unremarkable   Imaging: CT head w/ incidental 2.4cm L maxillary sinus cyst incompletely visualized, unremarkable otherwise. Treatment: none. Neurology was not consulted      Hospital course    CVA/TIA rule out: Chronic symptoms of intermittent R blurry vision, tingling in L fingers, L back numbness and L Leg spasms w/ weakness for the past 1.5 months. Head imaging unremarkable except for >80% stenosis of proximal R internal carotid artery. EKG sinus bianca w/ sinus arrhythmia, (unchanged form prior EKG at ). TSH 1.59, B12 1678, folate 40, HIV neg. UDS THC+. Trop neg x3  -Per neuro: appreciate recs              -ASA 81mg daily, plavix 75mg daily              -Continue permissive HTN w/ SBP up to 170, DBP 85-90 due to R ICA stenosis              -Plan to f/u OP in 4wks  -Final Echo results pending      Proximal R internal carotid artery stenosis: CTA head/neck w/ 80% stenosis. -Per vascular surgery pt can f/u OP  -Start lipitor 40mg QHS, plavix 75mg daily  -Change ASA to 81mg (pt previously taking 162mg daily)     Chronic Muscle Spasms and Numbness: intermittent per pt. States H/O started Mag Ox 800mg daily for cramps. Used to take Tramadol but states no longer does. Neurologically intact on exam.   -Cont home Mag Ox 800mg daily     Unintentional Weight loss: States was 195 lbs last week and last night was 183 lbs. Atributes weight loss to poor appetite and wife who travels frequently has not been home to cook him meals regularly like before. Denies bloody stools/abdominal pain/constipation. Does have an extensive smoking Hx.  CXR unremarkable.   -Counseled on smoking cessation   -Schedule low Dose CT annually for lung cancer screening     Hypertension: On admission BP was 172/56.  Normally taking lisinopril 15mg daily.   -Holding lisinopril for permissive HTN - re-eval OP in consultation w/ neurology     Hyperlipidemia: Lipid panel 3/31 w/ Tchol 104, HDL 38, LDL 53, tri 66. No home meds   -Start lipitor 40mg QHS due to elevated ASCVD risk in the setting of R ICA stenosis     Polycythemia Vera: Hgb on Admission 12 (BL 13). Sees H/O at [facility] and gets phlebotomy 1x month per pt (per charts, every 2 wks)  -Cont OP H/O w/ phlebotomy      Tobacco Dependence: endorses a little more than 1/2 ppd x >50yrs.   -Pt counseled on smoking cessation     Marijuana Use: endorses daiy marijuana due to intermittent muscle spasm/cramps use but states last time 4 days ago. UDS THC+.     B12 Deficiency: Pt endorses taking supplement for years but unsure if it was initiated due to deficiency. Endorses compliance w/ supplements. B12 1678 (high). -Cont B12 supplement     Vit D Def: cont supplements daily     Psych Hx: Anxiety, Depression, Bipolar Dep: no home meds. Pt states he is not convinced he has Bipolar I disorder  -F/u OP     BPH: Flomax 0.4mg daily     LAINE: states his LAINE has resolved since he lost weight recently. No home CPAP  -F/u OP      GERD: stable. Used to take ppi but states he no longer needs it.     Gout: cont Allopurinol 300mg daily         Physical exam at discharge:    Vitals Reviewed. Patient Vitals for the past 12 hrs:   Temp Pulse Resp BP SpO2   04/01/21 0750 97.5 °F (36.4 °C) (!) 56 17 138/72 99 %   04/01/21 0700 -- (!) 54 -- -- --   04/01/21 0330 98.9 °F (37.2 °C) 66 16 132/66 99 %   04/01/21 0000 97.8 °F (36.6 °C) 68 16 138/68 98 %        General: No acute distress. Alert. Cooperative. Head: Normocephalic. Atraumatic. Eyes:             Conjunctiva pink. Sclera white. PERRL. Ears:             Ear canals patent. TM non-erythematous. Nose:             Septum midline. Mucosa pink. No drainage. Throat: Mucosa pink. Moist mucous membranes. No tonsillar exudates or erythema. Palate movement equal bilaterally. Neck: Supple. Normal ROM. No stiffness. Respiratory: CTAB. No w/r/r/c.   Cardiovascular: RRR. Normal S1,S2. No m/r/g. Pulses 2+ throughout. GI: + bowel sounds. Nontender. No rebound tenderness or guarding. Nondistended. Extremities: No edema. No palpable cord. No tenderness. Musculoskeletal: Full ROM in all extremities. Neuro: CN II-XII intact. Strength 5/5 in all extremities. Sensation intact in all extremities. Skin: Clear. No rashes. No ulcers. : Deferred   Rectal: Deferred       Condition at discharge: Stable. Labs  Recent Labs     04/01/21  0502 03/31/21  0935   WBC 9.6 9.8   HGB 12.5 12.0*   HCT 43.6 41.4   * 636*     Recent Labs     04/01/21  0502 03/31/21  0935    138   K 4.4 4.1    110*   CO2 28 23   BUN 20 21*   CREA 1.01 0.99   * 120*   CA 8.7 8.5   MG 2.5* 2.3   PHOS 3.3 3.5     Recent Labs     04/01/21  0502 03/31/21  0935   ALT 27 26   AP 90 91   TBILI 0.8 0.6   TP 7.4 7.3   ALB 4.1 4.0   GLOB 3.3 3.3     Recent Labs     03/31/21  2140 03/31/21  1504 03/31/21  0935   TROIQ <0.05 <0.05 <0.05   INR  --   --  1.1   PTP  --   --  11.4*   APTT  --   --  27.3       Micro:  Lab Results   Component Value Date/Time    Culture result: NO SIGNIFICANT GROWTH 11/09/2014 03:20 PM       Imaging:  Mri Brain W Wo Cont    Result Date: 3/31/2021  EXAM:  MRI BRAIN W WO CONT INDICATION:    Intermittent right blurred vision, left upper extremity numbness COMPARISON:  None. CONTRAST: 17 ml Dotarem. TECHNIQUE:  Multiplanar multisequence acquisition without and with contrast of the brain. FINDINGS: The ventricles are normal in size and position. There is no acute infarct, hemorrhage, extra-axial fluid collection, or mass effect. There is no cerebellar tonsillar herniation. Expected arterial flow-voids are present.  No evidence of abnormal enhancement. Retention cyst is noted in the left maxillary sinus. The orbital contents are within normal limits. No significant osseous or scalp lesions are identified. No acute process or evidence of abnormal enhancement. Ct Head Wo Cont    Result Date: 3/31/2021  INDICATION: vision changes. Numbness. Exam: Noncontrast CT of the brain is performed with 5 mm collimation. CT dose reduction was achieved with the use of the standardized protocol tailored for this examination and automatic exposure control for dose modulation. FINDINGS: There is no acute intracranial hemorrhage, mass, mass effect or herniation. Ventricular system is normal. The gray-white matter differentiation is well-preserved. The mastoid air cells are well pneumatized. There is a 2.4 cm incompletely visualized left maxillary sinus mucus retention cyst. The visualized paranasal sinuses are otherwise clear. No acute intracranial hemorrhage, mass or infarct. Cta Head Neck W Cont    Result Date: 3/31/2021  INDICATION: CVA r/o EXAMINATION:  CT ANGIOGRAPHY HEAD AND NECK COMPARISON: None TECHNIQUE:  Following the uneventful administration of iodinated contrast material, axial CT angiography of the head and neck was performed. Delayed axial images through the head were also obtained. Coronal and sagittal reconstructions were obtained. Manual postprocessing of images was performed. 3-D  Sagittal maximal intensity projection images were obtained. 3-D Coronal maximal intensity projections were obtained. CT dose reduction was achieved through use of a standardized protocol tailored for this examination and automatic exposure control for dose modulation. FINDINGS: CTA NECK: Aortic Arch: No significant abnormality. Right Common Carotid Artery: No significant abnormality. Right Internal Carotid Artery:  Moderate calcified plaque at the origin, and focal severe stenosis of the proximal cervical segment, approximately 14 mm beyond the origin where the lumen narrows to less than 0.5 mm relative to the mid and distal cervical segment of 3.7 mm. NASCET Right: 85-90% or greater. Left Common Carotid Artery: No significant abnormality. Left Internal Carotid Artery: Mild calcific plaque, without significant stenosis. NASCET Left: 0-25%. Carotid stenosis determined using NASCET criteria. Right Vertebral Artery: No significant abnormality. Left Vertebral Artery: No significant abnormality. Cervical Soft Tissues: Right supraclavicular lymph nodes measure up to 14 x 10 mm. Scattered subcentimeter lymph nodes in the neck. Lung Apices: No significant abnormality. Bones: No destructive bone lesion. Additional Comments: N/A. CTA HEAD: Posterior Circulation: No flow limiting stenosis or occlusion. Anterior Circulation: Mild calcific atherosclerosis bilateral cavernous internal carotid artery segments. No flow-limiting stenosis. Anterior and middle cerebral arteries are patent. Additional Comments: No evidence of aneurysm or vascular malformation. Note: Perfusion imaging not performed. 1. No acute large vessel occlusion or dissection. 2. Focal, severe greater than 80% stenosis proximal right internal carotid artery.       Procedures / Diagnostic Studies  · Echo pending    Chronic diagnoses   Problem List as of 4/1/2021 Date Reviewed: 12/26/2019          Codes Class Noted - Resolved    CVA (cerebral vascular accident) [facility]) ICD-10-CM: I63.9  ICD-9-CM: 434.91  3/31/2021 - Present        Gout ICD-10-CM: M10.9  ICD-9-CM: 274.9  8/26/2019 - Present        Bipolar 1 disorder ([facility] ICD-10-CM: F31.9  ICD-9-CM: 296.7  5/2/2018 - Present        Benign prostatic hyperplasia ICD-10-CM: N40.0  ICD-9-CM: 600.00  8/22/2017 - Present        Chronic nonintractable headache ICD-10-CM: R51.9, G89.29  ICD-9-CM: 784.0  8/22/2016 - Present        Anxiety and depression ICD-10-CM: F41.9, F32.9  ICD-9-CM: 300.00, 311  8/22/2016 - Present        Vertigo ICD-10-CM: R42  ICD-9-CM: 780.4  8/22/2016 - Present        Gallstones ICD-10-CM: K80.20  ICD-9-CM: 574.20  4/8/2014 - Present        Ringing in ears ICD-10-CM: H93.19  ICD-9-CM: 388.30  8/12/2013 - Present        Fatigue ICD-10-CM: R53.83  ICD-9-CM: 780.79  8/12/2013 - Present        Polycythemia vera (HCC) (Chronic) ICD-10-CM: D45  ICD-9-CM: 238.4  4/29/2013 - Present        Mixed hyperlipidemia ICD-10-CM: E78.2  ICD-9-CM: 272.2  6/12/2012 - Present        LAINE (obstructive sleep apnea) ICD-10-CM: G47.33  ICD-9-CM: 327.23  2/1/2012 - Present        Depression ICD-10-CM: F32.9  ICD-9-CM: 024  Unknown - Present        Essential hypertension, benign ICD-10-CM: I10  ICD-9-CM: 401.1  6/15/2011 - Present              Current Discharge Medication List      START taking these medications    Details   atorvastatin (LIPITOR) 40 mg tablet Take 1 Tab by mouth nightly for 30 days. Qty: 30 Tab, Refills: 0      clopidogreL (PLAVIX) 75 mg tab Take 1 Tab by mouth daily for 30 days. Qty: 30 Tab, Refills: 0         CONTINUE these medications which have NOT CHANGED    Details   !! valACYclovir (Valtrex) 500 mg tablet Take 2,000 mg by mouth once as needed. Mouth prodrome      !! valACYclovir (Valtrex) 500 mg tablet Take 2,000 mg by mouth once as needed. 12 hours after mouth prodrome dose      magnesium oxide (MAG-OX) 400 mg tablet Take 800 mg by mouth daily (with lunch). cholecalciferol (Vitamin D3) (1000 Units /25 mcg) tablet Take 2,000 Units by mouth daily (with lunch). traMADoL (ULTRAM) 50 mg tablet Take 50 mg by mouth every six (6) hours as needed for Pain. aspirin 81 mg chewable tablet Take 81 mg by mouth daily. lisinopriL (PRINIVIL, ZESTRIL) 10 mg tablet Take 1.5 Tabs by mouth daily. tamsulosin (FLOMAX) 0.4 mg capsule TAKE 1 CAPSULE BY MOUTH EVERY DAY  Qty: 90 Cap, Refills: 0      allopurinol (ZYLOPRIM) 300 mg tablet Take 1 Tab by mouth daily.   Qty: 90 Tab, Refills: 3      cyanocobalamin (VITAMIN B-12) 1,000 mcg sublingual tablet Take 5,000 mcg by mouth daily. fluticasone (FLONASE ALLERGY RELIEF) 50 mcg/actuation nasal spray 2 Sprays by Both Nostrils route daily as needed. !! - Potential duplicate medications found. Please discuss with provider. STOP taking these medications       aspirin delayed-release 81 mg tablet Comments:   Reason for Stopping:                Diet:  Regular diet.     Activity:  As tolerated     Disposition: Home or Self Care    Discharge instructions to patient/family  Please seek medical attention for any new or worsening symptoms particularly fever, chest pain, shortness of breath, abdominal pain, nausea, vomiting    Follow up plans/appointments  Follow-up Information     Follow up With Specialties Details Why Contact Torito Haque NP Nurse Practitioner  Call to schedule neurology follow up visit; after hospital admission at [facility] for TIA [address] [place]  [address] [facility]             Lance Smith MD  Family Medicine Resident       For Billing    Chief Complaint   [facility]   [facility]  Date Reviewed: 12/26/2019          Codes Class Noted POA    CVA (cerebral vascular accident) [facility]) ICD-10-CM: I63.9  ICD-9-CM: 434.91  3/31/2021 Unknown

## 2021-04-01 NOTE — DISCHARGE INSTRUCTIONS
HOME DISCHARGE INSTRUCTIONS    Husam Ortiz / [id number] : 1955    Admission date: 3/31/2021 Discharge date: 2021 8:50 AM     Please bring this form with you to show your care provider at your follow-up appointment. Primary care provider:  Sophie Shipley MD    Discharging provider:  Jania Parada MD  - Family Medicine Resident  Gamal Khan MD - Family Medicine Attending      You have been admitted to the hospital with the following diagnoses:    ACUTE DIAGNOSES:  CVA (cerebral vascular accident) ([facility] [I63.9]    . . . . . . . . . . . . . . . . . . . . . . . . . . . . . . . . . . . . . . . . . . . . . . . . . . . . . . . . . . . . . . . . . . . . . . . .   You are well enough to be discharged from the hospital. However, because you were inpatient in a hospital, you are at greater risk of having been exposed to the coronavirus. PLEASE stay inside and self-quarantine for 14 days to prevent further spread of the coronavirus. . . . . . . . . . . . . . . . . . . . . . . . . . . . . . . . . . . . . . . . . . . . . . . . . . . . . . . . . . . . . . . . . . . . . . . . .   . . . . . . . . . . . . . . . . . . . . . . . . . . . . . . . . . . . . . . . . . . . . . . . . . . . . . . . . . . . . . . . . . . . . . . . Vira Libman      FOLLOW-UP CARE RECOMMENDATIONS:    Appointments  Follow-up Information     Follow up With Specialties Details Why Contact Info    Jose Holley NP Nurse Practitioner  Call to schedule neurology follow up visit; after hospital admission at [facility] for TIA [address]  [phone]      Sophie Shipley MD Family Medicine On 2021 VIRTUAL f/u appt after hospitalization w/ your PCP on  at 2:30pm [address] [facility] [address] [facility]  [phone]      Tanvir Mcconnell MD General and Vascular Surgery Schedule an appointment as soon as possible for a visit Follow up for internal carotid artery stenosis [address]  [address]  [phone]      Vanna Cheadle, MD Hematology Schedule an appointment as soon as possible for a visit Follow up for polycythemia vera [address] [facility]  [phone]               Follow-up tests needed: follow up with vascular surgery clogged artery in neck and neurology for tingling of left etremities. Ask your doctor about getting an annual low-dose CT scan to monitor for lung cancer. Pending test results: At the time of your discharge the following test results are still pending: final results from echocardiogram.   Please make sure you review these results with your outpatient follow-up provider(s). DIET/what to eat:  Regular Diet    ACTIVITY:  Activity as tolerated    Wound care: none    Equipment needed:  none    Specific symptoms to watch for: chest pain, shortness of breath, fever, chills, nausea, vomiting, diarrhea, change in mentation, falling, weakness, bleeding. What to do if new or unexpected symptoms occur? If you experience any of the above symptoms (or should other concerns or questions arise after discharge) please call your primary care physician. Return to the emergency room if you cannot get hold of your doctor. · It is very important that you keep your follow-up appointment(s). · Please bring discharge papers, medication list (and/or medication bottles) to your follow-up appointments for review by your outpatient provider(s). · Please check the list of medications and be sure it includes every medication (even non-prescription medications) that your provider wants you to take. · It is important that you take the medication exactly as they are prescribed. · Keep your medication in the bottles provided by the pharmacist and keep a list of the medication names, dosages, and times to be taken in your wallet. · Do not take other medications without consulting your doctor.    · If you have any questions about your medications or other instructions, please talk to your nurse or care provider before you leave the hospital.     Information obtained by:     I understand that if any problems occur once I am at home I am to contact my physician. These instructions were explained to me and I had the opportunity to ask questions. I understand and acknowledge receipt of the instructions indicated above.                                                                                                                                                Physician's or R.N.'s Signature                                                                  Date/Time                                                                                                                                              Patient or Representative Signature                                                          Date/Time

## 2021-04-01 NOTE — PROGRESS NOTES
Problem: Breathing Pattern - Ineffective  Goal: *Absence of hypoxia  Outcome: Progressing Towards Goal  Goal: *Use of effective breathing techniques  Outcome: Progressing Towards Goal  Goal: *PALLIATIVE CARE:  Alleviation of Dyspnea  Outcome: Progressing Towards Goal     Problem: Patient Education: Go to Patient Education Activity  Goal: Patient/Family Education  Outcome: Progressing Towards Goal     Problem: Falls - Risk of  Goal: *Absence of Falls  Description: Document Pao Fall Risk and appropriate interventions in the flowsheet.   Outcome: Progressing Towards Goal  Note: Fall Risk Interventions:                                Problem: Patient Education: Go to Patient Education Activity  Goal: Patient/Family Education  Outcome: Progressing Towards Goal     Problem: Airway Clearance - Ineffective  Goal: *Patent airway  Outcome: Progressing Towards Goal  Goal: *Absence of airway secretions  Outcome: Progressing Towards Goal  Goal: *Able to cough effectively  Outcome: Progressing Towards Goal  Goal: *PALLIATIVE CARE:  Alleviation of secretions, cough and/or nasal congestion  Outcome: Progressing Towards Goal     Problem: Patient Education: Go to Patient Education Activity  Goal: Patient/Family Education  Outcome: Progressing Towards Goal     Problem: Pain  Goal: *Control of Pain  Outcome: Progressing Towards Goal  Goal: *PALLIATIVE CARE:  Alleviation of Pain  Outcome: Progressing Towards Goal

## 2021-04-01 NOTE — PROGRESS NOTES
PHYSICAL THERAPY EVALUATION/DISCHARGE  Patient: Corona Mello (61 y.o. male)  Date: 4/1/2021  Primary Diagnosis: CVA (cerebral vascular accident) [facility]) [I63.9]       Precautions:          ASSESSMENT  Based on the objective data described below, the patient presents with normal strength, full AROM, steady gait and balance during dynamic activity consistent with baseline level s/p admission for CVA/TIA work up. Patient denies having any symptoms at present but reports they \"wax and wane\" over the last month and reports he believes it all due to the allopurinol he has been taking. Perseverating on this, but agreeable to mobility assessment. No concerns observed with mobility, patient moves very well with multiple distractions and stimuli. Lives with his wife who is working out of town most of the time, indep at baseline. Will complete order. Functional Outcome Measure: The patient scored 95 on the Barthel outcome measure which is indicative of full independence. Other factors to consider for discharge: none     Further skilled acute physical therapy is not indicated at this time. PLAN :  Recommendation for discharge: (in order for the patient to meet his/her long term goals)  No skilled physical therapy/ follow up rehabilitation needs identified at this time. This discharge recommendation:  Has been made in collaboration with the attending provider and/or case management    IF patient discharges home will need the following DME: none       SUBJECTIVE:   Patient stated I need to talk to my doctor right now about this medicine.     OBJECTIVE DATA SUMMARY:   HISTORY:    Past Medical History:   Diagnosis Date    Depression     GERD (gastroesophageal reflux disease)     Hypertension     Liver disease 1984    Fatty liver    Other ill-defined conditions(799.89)     gallstones    Polycythemia vera(238.4) 4/29/2013    Prostatitis     Sleep apnea 12/2011    doesn't use c-pap;  lost 25 lbs and has improved    Vertigo      Past Surgical History:   Procedure Laterality Date    HX CHOLECYSTECTOMY      HX COLONOSCOPY      HX ORTHOPAEDIC  1970    left wrist compound fracture    HX OTHER SURGICAL  2013    molar removal (dental)       Prior level of function: indep and active, no DME  Personal factors and/or comorbidities impacting plan of care:     Home Situation  Home Environment: Private residence  One/Two Story Residence: Two story  Living Alone: No  Support Systems: Spouse/Significant Other/Partner, Other (comments)(reports spouse works out of town)  Current DME Used/Available at Home: [address] [facility] [place], [facility]    EXAMINATION/PRESENTATION/DECISION MAKING:   Critical Behavior:  Neurologic State: Alert  Orientation Level: Oriented X4  Cognition: Follows commands, Appropriate for age attention/concentration, Appropriate safety awareness  Safety/Judgement: Awareness of environment, Fall prevention, Home safety, Insight into deficits  Hearing: Auditory  Auditory Impairment: None    Range Of Motion:  AROM: Within functional limits           PROM: Within functional limits           Strength:    Strength: Within functional limits                    Tone & Sensation:   Tone: Normal              Sensation: Impaired(reports fluctuation, intact at time of assessment)               Coordination:  Coordination: Within functional limits  Vision:      Functional Mobility:  Bed Mobility:  Rolling: Independent  Supine to Sit: Independent  Sit to Supine: Independent  Scooting: Independent  Transfers:  Sit to Stand: Independent  Stand to Sit: Independent        Bed to Chair: Independent              Balance:   Sitting: Intact; Without support  Standing: Intact; Without support  Ambulation/Gait Training:  Distance (ft): 300 Feet (ft)  Assistive Device: Gait belt  Ambulation - Level of Assistance: Independent                                                Stairs:  Number of Stairs Trained: 14  Stairs - Level of Assistance: Independent   Rail Use: Left       Functional Measure:  Barthel Index:    Bathin  Bladder: 10  Bowels: 10  Groomin  Dressing: 10  Feeding: 10  Mobility: 15  Stairs: 10  Toilet Use: 10  Transfer (Bed to Chair and Back): 10  Total: 95/100       The Barthel ADL Index: Guidelines  1. The index should be used as a record of what a patient does, not as a record of what a patient could do. 2. The main aim is to establish degree of independence from any help, physical or verbal, however minor and for whatever reason. 3. The need for supervision renders the patient not independent. 4. A patient's performance should be established using the best available evidence. Asking the patient, friends/relatives and nurses are the usual sources, but direct observation and common sense are also important. However direct testing is not needed. 5. Usually the patient's performance over the preceding 24-48 hours is important, but occasionally longer periods will be relevant. 6. Middle categories imply that the patient supplies over 50 per cent of the effort. 7. Use of aids to be independent is allowed. Nj Ross., Barthel, D.W. (4499). Functional evaluation: the Barthel Index. [address] [facility] (14)2. Chrystal Mendoza, TANI, Gale Munson., Abel Fisher., [place], [address] (). Measuring the change indisability after inpatient rehabilitation; comparison of the responsiveness of the Barthel Index and Functional [place] Measure. Journal of Neurology, Neurosurgery, and Psychiatry, 66(4), 744-974. Nancy Judd, N.J.A, JAIR Motley, & Naty Mark M.A. (2004.) Assessment of post-stroke quality of life in cost-effectiveness studies: The usefulness of the Barthel Index and the EuroQoL-5D.  Quality of Life Research, 15, 811-42            Physical Therapy Evaluation Charge Determination   History Examination Presentation Decision-Making   MEDIUM  Complexity : 1-2 comorbidities / personal factors will impact the outcome/ POC  LOW Complexity : 1-2 Standardized tests and measures addressing body structure, function, activity limitation and / or participation in recreation  LOW Complexity : Stable, uncomplicated  Other outcome measures Barthel 95  LOW       Based on the above components, the patient evaluation is determined to be of the following complexity level: LOW     Pain Rating:  None reported    Activity Tolerance:   Good      After treatment patient left in no apparent distress:   Sitting in chair    COMMUNICATION/EDUCATION:   The patients plan of care was discussed with: Occupational therapist and Registered nurse. Fall prevention education was provided and the patient/caregiver indicated understanding.     Thank you for this referral.  Brody Leal, PT   Time Calculation: 10 mins

## 2021-04-07 ENCOUNTER — VIRTUAL VISIT (OUTPATIENT)
Dept: FAMILY MEDICINE CLINIC | Age: 66
End: 2021-04-07
Payer: COMMERCIAL

## 2021-04-07 DIAGNOSIS — I10 ESSENTIAL HYPERTENSION, BENIGN: ICD-10-CM

## 2021-04-07 DIAGNOSIS — D45 POLYCYTHEMIA VERA (HCC): ICD-10-CM

## 2021-04-07 DIAGNOSIS — I63.9 CEREBROVASCULAR ACCIDENT (CVA), UNSPECIFIED MECHANISM (HCC): Primary | ICD-10-CM

## 2021-04-07 DIAGNOSIS — F31.9 BIPOLAR 1 DISORDER (HCC): ICD-10-CM

## 2021-04-07 DIAGNOSIS — I65.29 STENOSIS OF CAROTID ARTERY, UNSPECIFIED LATERALITY: ICD-10-CM

## 2021-04-07 PROCEDURE — G8756 NO BP MEASURE DOC: HCPCS | Performed by: FAMILY MEDICINE

## 2021-04-07 PROCEDURE — G8427 DOCREV CUR MEDS BY ELIG CLIN: HCPCS | Performed by: FAMILY MEDICINE

## 2021-04-07 PROCEDURE — 3017F COLORECTAL CA SCREEN DOC REV: CPT | Performed by: FAMILY MEDICINE

## 2021-04-07 PROCEDURE — G9717 DOC PT DX DEP/BP F/U NT REQ: HCPCS | Performed by: FAMILY MEDICINE

## 2021-04-07 PROCEDURE — 99214 OFFICE O/P EST MOD 30 MIN: CPT | Performed by: FAMILY MEDICINE

## 2021-04-07 PROCEDURE — 1111F DSCHRG MED/CURRENT MED MERGE: CPT | Performed by: FAMILY MEDICINE

## 2021-04-07 RX ORDER — FLUTICASONE PROPIONATE 50 MCG
2 SPRAY, SUSPENSION (ML) NASAL DAILY
Qty: 1 BOTTLE | Refills: 5 | Status: SHIPPED | OUTPATIENT
Start: 2021-04-07 | End: 2021-04-21

## 2021-04-07 NOTE — PROGRESS NOTES
Patient is here today with complaints after being discharged in the hospital for continuing visual changes and headaches. Patient was admitted to the hospital with a questionable TIA versus stroke. He was found to have carotid stenosis and is has an appointment upcoming with the surgeon to have this fixed. Patient also scheduled to see his hematologist for his polycythemia vera. Patient would like a refill of his Flonase. Consent: Pippa Muniz, who was seen by synchronous (real-time) audio-video technology, and/or his healthcare decision maker, is aware that this patient-initiated, Telehealth encounter on 4/7/2021 is a billable service, with coverage as determined by his insurance carrier. He is aware that he may receive a bill and has provided verbal consent to proceed: YES-Consent obtained within past 12 months  712    Prior to Admission medications    Medication Sig Start Date End Date Taking? Authorizing Provider   fluticasone propionate (FLONASE) 50 mcg/actuation nasal spray 2 Sprays by Both Nostrils route daily. 4/7/21  Yes Shailesh Castaneda MD   atorvastatin (LIPITOR) 40 mg tablet Take 1 Tab by mouth nightly for 30 days. 4/1/21 5/1/21  Mariah Manning MD   clopidogreL (PLAVIX) 75 mg tab Take 1 Tab by mouth daily for 30 days. 4/2/21 5/2/21  Mariah Manning MD   valACYclovir (Valtrex) 500 mg tablet Take 2,000 mg by mouth once as needed. Mouth prodrome    Provider, Historical   valACYclovir (Valtrex) 500 mg tablet Take 2,000 mg by mouth once as needed. 12 hours after mouth prodrome dose    Provider, Historical   magnesium oxide (MAG-OX) 400 mg tablet Take 800 mg by mouth daily (with lunch). Provider, Historical   cholecalciferol (Vitamin D3) (1000 Units /25 mcg) tablet Take 2,000 Units by mouth daily (with lunch). Provider, Historical   traMADoL (ULTRAM) 50 mg tablet Take 50 mg by mouth every six (6) hours as needed for Pain.     Provider, Historical   aspirin 81 mg chewable tablet Take 81 mg by mouth daily. 3/30/21   Char Milligan NP   lisinopriL (PRINIVIL, ZESTRIL) 10 mg tablet Take 1.5 Tabs by mouth daily. 3/30/21   Char Milligan NP   tamsulosin (FLOMAX) 0.4 mg capsule TAKE 1 CAPSULE BY MOUTH EVERY DAY 8/31/20   Dann Jara MD   allopurinol (ZYLOPRIM) 300 mg tablet Take 1 Tab by mouth daily. 12/9/19   Dann Jara MD   cyanocobalamin (VITAMIN B-12) 1,000 mcg sublingual tablet Take 5,000 mcg by mouth daily. Provider, Historical   fluticasone (FLONASE ALLERGY RELIEF) 50 mcg/actuation nasal spray 2 Sprays by Both Nostrils route daily as needed. Provider, Historical     Allergies   Allergen Reactions    Sulfa (Sulfonamide Antibiotics) Anaphylaxis, Hives and Unknown (comments)    Sulfur Anaphylaxis, Hives and Seizures    Zyprexa [Olanzapine] Anaphylaxis    Celexa [Citalopram] Other (comments)     groggy    Clindamycin Nausea and Vomiting    Lisinopril Other (comments)     Denies allergy to this medication. Patient states he is currently taking.  Prozac [Fluoxetine] Anxiety    Risperidone Anxiety           Assessment & Plan:   Diagnoses and all orders for this visit:    1. Cerebrovascular accident (CVA), unspecified mechanism [facility])  Patient status post discharge see discharge summary  Patient to follow-up with endocrinology as well as neurology    2. Essential hypertension, benign  Blood pressure at goal at home with medication changes    3. Bipolar 1 disorder (HCC)  Stable on current meds    4. Polycythemia vera ([address]  Follow-up with hematologist    5. Stenosis of carotid artery, unspecified laterality  Patient has appointment to have this fixed, continue with anticoagulation as planned    Other orders  -     fluticasone propionate (FLONASE) 50 mcg/actuation nasal spray; 2 Sprays by Both Nostrils route daily.         Medication Side Effects and Warnings were discussed with patient  Patient Labs were reviewed and or requested:  Patient Past Records were reviewed and or requested              We discussed the expected course, resolution and complications of the diagnosis(es) in detail. Medication risks, benefits, costs, interactions, and alternatives were discussed as indicated. I advised him to contact the office if his condition worsens, changes or fails to improve as anticipated. He expressed understanding with the diagnosis(es) and plan. Dorene Weeks is a 63 y.o. male being evaluated by a video visit encounter for concerns as above. A caregiver was present when appropriate. Due to this being a TeleHealth encounter (During XMVEC-18 public health emergency), evaluation of the following organ systems was limited: Vitals/Constitutional/EENT/Resp/CV/GI//MS/Neuro/Skin/Heme-Lymph-Imm. Pursuant to the emergency declaration under the [facility], [facility]5 waiver authority and the "[employer]" and [employer] Act, this Virtual  Visit was conducted, with patient's (and/or legal guardian's) consent, to reduce the patient's risk of exposure to COVID-19 and provide necessary medical care. Services were provided through a video synchronous discussion virtually to substitute for in-person clinic visit. Patient and provider were located at their individual homes.         Cady Quick MD

## 2021-04-19 ENCOUNTER — TELEPHONE (OUTPATIENT)
Dept: FAMILY MEDICINE CLINIC | Age: 66
End: 2021-04-19

## 2021-04-20 ENCOUNTER — TELEPHONE (OUTPATIENT)
Dept: FAMILY MEDICINE CLINIC | Age: 66
End: 2021-04-20

## 2021-04-21 RX ORDER — COLCHICINE 0.6 MG/1
0.6 CAPSULE ORAL 2 TIMES DAILY
Qty: 60 CAP | Refills: 1 | Status: SHIPPED | OUTPATIENT
Start: 2021-04-21 | End: 2021-04-21

## 2021-04-21 NOTE — PERIOP NOTES
[address] [address] [facility]   MAIN OR                                  [phone]   MAIN PRE OP                          [phone]                                                                                AMBULATORY PRE OP          [phone]  PRE-ADMISSION TESTING    [phone]   Surgery Date: 4/29/21 THURSDAY       Is surgery arrival time given by surgeon? NO    If NO, [facility] staff will call you between 3 and 7pm the day before your surgery with your arrival time. (If your surgery is on a Monday, we will call you the Friday before.)      Call [phone] after 7pm Monday-Friday if you did not receive this call. INSTRUCTIONS BEFORE YOUR SURGERY   When You  Arrive Arrive at the 2nd [address] [facility] on the day of your surgery  Have your insurance card, photo ID, and any copayment (if needed)   Food   and   Drink NO food or drink after midnight the night before surgery    This means NO water, gum, mints, coffee, juice, etc.  No alcohol (beer, wine, liquor) 24 hours before and after surgery   Medications to   TAKE   Morning of Surgery MEDICATIONS TO TAKE THE MORNING OF SURGERY WITH A SIP OF WATER:    TAMSULOSIN    TRAMADOL IF NEEDED   FLONASE AS NEEDED    CALL  DR Albert Herron OFFICE FOR PLAVIX  INSTRUCTIONS      Medications  To  STOP      7 days before surgery  Non-Steroidal anti-inflammatory Drugs (NSAID's): for example, Ibuprofen (Advil, Motrin), Naproxen (Aleve)   Aspirin, if taking for pain    Herbal supplements, vitamins, and fish oil   Other:  (Pain medications not listed above, including Tylenol may be taken)   Blood  Thinners  If you take  Aspirin, Plavix, Coumadin, or any blood-thinning or anti-blood clot medicine, talk to the doctor who prescribed the medications for pre-operative instructions.    Bathing Clothing  Jewelry  Valuables      If you shower the morning of surgery, please do not apply anything to your skin (lotions, powders, deodorant, or makeup, especially mascara)   Follow Chlorhexidine Care Fusion body wash instructions provided to you during PAT appointment. Begin 3 days prior to surgery.  Do not shave or trim anywhere 24 hours before surgery   Wear your hair loose or down; no pony-tails, buns, or metal hair clips   Wear loose, comfortable, clean clothes   Wear glasses instead of contacts   Leave money, valuables, and jewelry, including body piercings, at home   Going Home - or Spending the Night  SAME-DAY SURGERY: You must have a responsible adult drive you home and stay with you 24 hours after surgery   ADMITS: If your doctor is keeping you in the hospital after surgery, leave personal belongings/luggage in your car until you have a hospital room number. Hospital discharge time is 12 noon  Drivers must be here before 12 noon unless you are told differently   Special Instructions It is now mandated that all surgical patients be tested for COVID-19 prior to surgery. Testing has to be exactly 4 days prior to surgery. Your COVID test date is Sunday 4/25/21 between 8:00 am and 11:00 am.       COVID testing will be performed curbside at the [facility] [address]. There will be signs leading you to the testing site. You will need to bring a photo ID with you to be swabbed. Patients are advised to self-quarantine at home after testing and prior to your surgery date. You will be notified if your results are positive.     What to watch for:   Coronavirus (COVID-19) affects different people in different ways   It also appears with a wide range of symptoms from mild to severe   Signs usually appear 2-14 days after exposure     If you develop any of the following, notify your doctor immediately:  o Fever  o Chills, with or without a shiver  o Muscle pain  o Headache  o Sore throat  o Dry cough  o New loss of taste or smell  o Tiredness      If you develop any of the following, call 911:  o Shortness of breath  o Difficulty breathing  o Chest pain  o New confusion  o Blueness of fingers and/or lips     Follow all instructions so your surgery wont be cancelled. Please, be on time. If a situation occurs and you are delayed the day of surgery, call [phone] or [phone]. If your physical condition changes (like a fever, cold, flu, etc.) call your surgeon. Home medication(s) reviewed and verified via   PHONE   LIST   VERBAL   during PAT appointment. The patient was contacted by  PHONE    IN-PERSON  The patient verbalizes understanding of all instructions and   DOES   DOES NOT   need reinforcement.

## 2021-04-22 ENCOUNTER — TRANSCRIBE ORDER (OUTPATIENT)
Dept: REGISTRATION | Age: 66
End: 2021-04-22

## 2021-04-22 DIAGNOSIS — Z01.812 ENCOUNTER FOR PREOPERATIVE SCREENING LABORATORY TESTING FOR COVID-19 VIRUS: Primary | ICD-10-CM

## 2021-04-22 DIAGNOSIS — Z20.822 ENCOUNTER FOR PREOPERATIVE SCREENING LABORATORY TESTING FOR COVID-19 VIRUS: Primary | ICD-10-CM

## 2021-04-22 NOTE — PROGRESS NOTES
Phone call from pt requesting update to information given during PAT phone interview yesterday 4/21/2021. Pt states he fell in sometime in January 2021. He states he hit the upper back of his head; denies LOC. Reports he had \"lump\" that resolved after a few days. He did not seek medical treatment. He C/O this are is currently warm to touch and muscular pain when he turn his head. He reports an MRI of head was done last week for S/S TIA.

## 2021-04-25 ENCOUNTER — HOSPITAL ENCOUNTER (OUTPATIENT)
Dept: PREADMISSION TESTING | Age: 66
Discharge: HOME | End: 2021-04-25
Payer: COMMERCIAL

## 2021-04-25 PROCEDURE — U0003 INFECTIOUS AGENT DETECTION BY NUCLEIC ACID (DNA OR RNA); SEVERE ACUTE RESPIRATORY SYNDROME CORONAVIRUS 2 (SARS-COV-2) (CORONAVIRUS DISEASE [COVID-19]), AMPLIFIED PROBE TECHNIQUE, MAKING USE OF HIGH THROUGHPUT TECHNOLOGIES AS DESCRIBED BY CMS-2020-01-R: HCPCS

## 2021-04-26 LAB — SARS-COV-2, COV2NT: NOT DETECTED

## 2021-04-28 ENCOUNTER — ANESTHESIA EVENT (OUTPATIENT)
Dept: SURGERY | Age: 66
DRG: 039 | End: 2021-04-28
Payer: COMMERCIAL

## 2021-04-29 ENCOUNTER — APPOINTMENT (OUTPATIENT)
Dept: GENERAL RADIOLOGY | Age: 66
DRG: 039 | End: 2021-04-29
Payer: COMMERCIAL

## 2021-04-29 ENCOUNTER — HOSPITAL ENCOUNTER (INPATIENT)
Age: 66
LOS: 1 days | Discharge: HOME | DRG: 039 | End: 2021-04-30
Payer: COMMERCIAL

## 2021-04-29 ENCOUNTER — ANESTHESIA (OUTPATIENT)
Dept: SURGERY | Age: 66
DRG: 039 | End: 2021-04-29
Payer: COMMERCIAL

## 2021-04-29 DIAGNOSIS — I65.29 STENOSIS OF CAROTID ARTERY, UNSPECIFIED LATERALITY: Primary | ICD-10-CM

## 2021-04-29 PROBLEM — I77.9 CAROTID ARTERY DISEASE (HCC): Status: ACTIVE | Noted: 2021-04-29

## 2021-04-29 LAB
ABO + RH BLD: NORMAL
BLOOD GROUP ANTIBODIES SERPL: NORMAL
HCT VFR BLD AUTO: 34.1 % (ref 36.6–50.3)
HGB BLD-MCNC: 9.6 G/DL (ref 12.1–17)
SPECIMEN EXP DATE BLD: NORMAL

## 2021-04-29 PROCEDURE — 77030038552 HC DRN WND MDII -A

## 2021-04-29 PROCEDURE — 74011250636 HC RX REV CODE- 250/636

## 2021-04-29 PROCEDURE — 03UH0KZ SUPPLEMENT RIGHT COMMON CAROTID ARTERY WITH NONAUTOLOGOUS TISSUE SUBSTITUTE, OPEN APPROACH: ICD-10-PCS

## 2021-04-29 PROCEDURE — 77030008684 HC TU ET CUF COVD -B: Performed by: ANESTHESIOLOGY

## 2021-04-29 PROCEDURE — 77030028271 HC SRGFL HEMSTAT MTRX KT J&J -C

## 2021-04-29 PROCEDURE — 71045 X-RAY EXAM CHEST 1 VIEW: CPT

## 2021-04-29 PROCEDURE — 77030005513 HC CATH URETH FOL11 MDII -B

## 2021-04-29 PROCEDURE — 74011250636 HC RX REV CODE- 250/636: Performed by: ANESTHESIOLOGY

## 2021-04-29 PROCEDURE — 36415 COLL VENOUS BLD VENIPUNCTURE: CPT

## 2021-04-29 PROCEDURE — 74011000250 HC RX REV CODE- 250: Performed by: ANESTHESIOLOGY

## 2021-04-29 PROCEDURE — 77030010507 HC ADH SKN DERMBND J&J -B

## 2021-04-29 PROCEDURE — 85014 HEMATOCRIT: CPT

## 2021-04-29 PROCEDURE — 74011250636 HC RX REV CODE- 250/636: Performed by: NURSE ANESTHETIST, CERTIFIED REGISTERED

## 2021-04-29 PROCEDURE — 03CH0ZZ EXTIRPATION OF MATTER FROM RIGHT COMMON CAROTID ARTERY, OPEN APPROACH: ICD-10-PCS

## 2021-04-29 PROCEDURE — 76010000171 HC OR TIME 2 TO 2.5 HR INTENSV-TIER 1

## 2021-04-29 PROCEDURE — 74011250637 HC RX REV CODE- 250/637

## 2021-04-29 PROCEDURE — 93005 ELECTROCARDIOGRAM TRACING: CPT

## 2021-04-29 PROCEDURE — 77030008703 HC TU ET UNCUF COVD -A: Performed by: ANESTHESIOLOGY

## 2021-04-29 PROCEDURE — 77030031139 HC SUT VCRL2 J&J -A

## 2021-04-29 PROCEDURE — 77030019940 HC BLNKT HYPOTHRM STRY -B

## 2021-04-29 PROCEDURE — 77030005401 HC CATH RAD ARRO -A: Performed by: ANESTHESIOLOGY

## 2021-04-29 PROCEDURE — 77030019905 HC CATH URETH INTMIT MDII -A

## 2021-04-29 PROCEDURE — 77030040506 HC DRN WND MDII -A

## 2021-04-29 PROCEDURE — 2709999900 HC NON-CHARGEABLE SUPPLY

## 2021-04-29 PROCEDURE — 76210000005 HC OR PH I REC 5 TO 5.5 HR

## 2021-04-29 PROCEDURE — 74011000250 HC RX REV CODE- 250: Performed by: NURSE ANESTHETIST, CERTIFIED REGISTERED

## 2021-04-29 PROCEDURE — 65270000029 HC RM PRIVATE

## 2021-04-29 PROCEDURE — C1768 GRAFT, VASCULAR: HCPCS

## 2021-04-29 PROCEDURE — 74011000250 HC RX REV CODE- 250

## 2021-04-29 PROCEDURE — 77030002933 HC SUT MCRYL J&J -A

## 2021-04-29 PROCEDURE — 77030013797 HC KT TRNSDUC PRSSR EDWD -A: Performed by: ANESTHESIOLOGY

## 2021-04-29 PROCEDURE — 77030026438 HC STYL ET INTUB CARD -A: Performed by: ANESTHESIOLOGY

## 2021-04-29 PROCEDURE — 77030002986 HC SUT PROL J&J -A

## 2021-04-29 PROCEDURE — 86901 BLOOD TYPING SEROLOGIC RH(D): CPT

## 2021-04-29 PROCEDURE — 76060000035 HC ANESTHESIA 2 TO 2.5 HR

## 2021-04-29 DEVICE — GRAFT VASC W0.3XL3IN THK0.36IN CLLGN ULT THN KNIT PTCH: Type: IMPLANTABLE DEVICE | Site: CAROTID | Status: FUNCTIONAL

## 2021-04-29 RX ORDER — FENTANYL CITRATE 50 UG/ML
INJECTION, SOLUTION INTRAMUSCULAR; INTRAVENOUS AS NEEDED
Status: DISCONTINUED | OUTPATIENT
Start: 2021-04-29 | End: 2021-04-29 | Stop reason: HOSPADM

## 2021-04-29 RX ORDER — HYDROCODONE BITARTRATE AND ACETAMINOPHEN 5; 325 MG/1; MG/1
1 TABLET ORAL
Status: DISCONTINUED | OUTPATIENT
Start: 2021-04-29 | End: 2021-04-30 | Stop reason: HOSPADM

## 2021-04-29 RX ORDER — MELATONIN
2000
Status: CANCELLED | OUTPATIENT
Start: 2021-04-30

## 2021-04-29 RX ORDER — SODIUM CHLORIDE, SODIUM LACTATE, POTASSIUM CHLORIDE, CALCIUM CHLORIDE 600; 310; 30; 20 MG/100ML; MG/100ML; MG/100ML; MG/100ML
100 INJECTION, SOLUTION INTRAVENOUS CONTINUOUS
Status: DISCONTINUED | OUTPATIENT
Start: 2021-04-29 | End: 2021-04-29 | Stop reason: HOSPADM

## 2021-04-29 RX ORDER — PROTAMINE SULFATE 10 MG/ML
INJECTION, SOLUTION INTRAVENOUS AS NEEDED
Status: DISCONTINUED | OUTPATIENT
Start: 2021-04-29 | End: 2021-04-29 | Stop reason: HOSPADM

## 2021-04-29 RX ORDER — ATORVASTATIN CALCIUM 20 MG/1
40 TABLET, FILM COATED ORAL
Status: CANCELLED | OUTPATIENT
Start: 2021-04-29

## 2021-04-29 RX ORDER — EPHEDRINE SULFATE/0.9% NACL/PF 50 MG/5 ML
10 SYRINGE (ML) INTRAVENOUS ONCE
Status: COMPLETED | OUTPATIENT
Start: 2021-04-29 | End: 2021-04-29

## 2021-04-29 RX ORDER — LIDOCAINE HYDROCHLORIDE 10 MG/ML
0.1 INJECTION, SOLUTION EPIDURAL; INFILTRATION; INTRACAUDAL; PERINEURAL AS NEEDED
Status: DISCONTINUED | OUTPATIENT
Start: 2021-04-29 | End: 2021-04-29 | Stop reason: HOSPADM

## 2021-04-29 RX ORDER — VECURONIUM BROMIDE FOR INJECTION 1 MG/ML
INJECTION, POWDER, LYOPHILIZED, FOR SOLUTION INTRAVENOUS AS NEEDED
Status: DISCONTINUED | OUTPATIENT
Start: 2021-04-29 | End: 2021-04-29 | Stop reason: HOSPADM

## 2021-04-29 RX ORDER — HEPARIN SODIUM 1000 [USP'U]/ML
INJECTION, SOLUTION INTRAVENOUS; SUBCUTANEOUS AS NEEDED
Status: DISCONTINUED | OUTPATIENT
Start: 2021-04-29 | End: 2021-04-29 | Stop reason: HOSPADM

## 2021-04-29 RX ORDER — HEPARIN SODIUM 5000 [USP'U]/ML
5000 INJECTION, SOLUTION INTRAVENOUS; SUBCUTANEOUS EVERY 8 HOURS
Status: CANCELLED | OUTPATIENT
Start: 2021-04-29

## 2021-04-29 RX ORDER — PROPOFOL 10 MG/ML
INJECTION, EMULSION INTRAVENOUS AS NEEDED
Status: DISCONTINUED | OUTPATIENT
Start: 2021-04-29 | End: 2021-04-29 | Stop reason: HOSPADM

## 2021-04-29 RX ORDER — LABETALOL HYDROCHLORIDE 5 MG/ML
INJECTION, SOLUTION INTRAVENOUS AS NEEDED
Status: DISCONTINUED | OUTPATIENT
Start: 2021-04-29 | End: 2021-04-29 | Stop reason: HOSPADM

## 2021-04-29 RX ORDER — LIDOCAINE HYDROCHLORIDE 20 MG/ML
INJECTION, SOLUTION EPIDURAL; INFILTRATION; INTRACAUDAL; PERINEURAL AS NEEDED
Status: DISCONTINUED | OUTPATIENT
Start: 2021-04-29 | End: 2021-04-29 | Stop reason: HOSPADM

## 2021-04-29 RX ORDER — GUAIFENESIN 100 MG/5ML
81 LIQUID (ML) ORAL DAILY
Status: CANCELLED | OUTPATIENT
Start: 2021-04-30

## 2021-04-29 RX ORDER — MIDAZOLAM HYDROCHLORIDE 1 MG/ML
INJECTION, SOLUTION INTRAMUSCULAR; INTRAVENOUS AS NEEDED
Status: DISCONTINUED | OUTPATIENT
Start: 2021-04-29 | End: 2021-04-29 | Stop reason: HOSPADM

## 2021-04-29 RX ORDER — PHENYLEPHRINE HCL IN 0.9% NACL 0.4MG/10ML
SYRINGE (ML) INTRAVENOUS AS NEEDED
Status: DISCONTINUED | OUTPATIENT
Start: 2021-04-29 | End: 2021-04-29 | Stop reason: HOSPADM

## 2021-04-29 RX ORDER — SODIUM CHLORIDE 0.9 % (FLUSH) 0.9 %
5-40 SYRINGE (ML) INJECTION EVERY 8 HOURS
Status: CANCELLED | OUTPATIENT
Start: 2021-04-29

## 2021-04-29 RX ORDER — DEXTROSE, SODIUM CHLORIDE, AND POTASSIUM CHLORIDE 5; .45; .15 G/100ML; G/100ML; G/100ML
75 INJECTION INTRAVENOUS CONTINUOUS
Status: CANCELLED | OUTPATIENT
Start: 2021-04-29

## 2021-04-29 RX ORDER — TAMSULOSIN HYDROCHLORIDE 0.4 MG/1
0.4 CAPSULE ORAL DAILY
Status: CANCELLED | OUTPATIENT
Start: 2021-04-30

## 2021-04-29 RX ORDER — SODIUM CHLORIDE, SODIUM LACTATE, POTASSIUM CHLORIDE, CALCIUM CHLORIDE 600; 310; 30; 20 MG/100ML; MG/100ML; MG/100ML; MG/100ML
100 INJECTION, SOLUTION INTRAVENOUS CONTINUOUS
Status: DISCONTINUED | OUTPATIENT
Start: 2021-04-29 | End: 2021-04-30 | Stop reason: HOSPADM

## 2021-04-29 RX ORDER — HYDROMORPHONE HYDROCHLORIDE 1 MG/ML
.25-1 INJECTION, SOLUTION INTRAMUSCULAR; INTRAVENOUS; SUBCUTANEOUS
Status: DISCONTINUED | OUTPATIENT
Start: 2021-04-29 | End: 2021-04-30 | Stop reason: HOSPADM

## 2021-04-29 RX ORDER — LANOLIN ALCOHOL/MO/W.PET/CERES
800 CREAM (GRAM) TOPICAL
Status: CANCELLED | OUTPATIENT
Start: 2021-04-30

## 2021-04-29 RX ORDER — ATROPINE SULFATE 0.1 MG/ML
0.4 INJECTION INTRAVENOUS ONCE
Status: COMPLETED | OUTPATIENT
Start: 2021-04-29 | End: 2021-04-29

## 2021-04-29 RX ORDER — HYDROMORPHONE HYDROCHLORIDE 1 MG/ML
0.5 INJECTION, SOLUTION INTRAMUSCULAR; INTRAVENOUS; SUBCUTANEOUS
Status: CANCELLED | OUTPATIENT
Start: 2021-04-29

## 2021-04-29 RX ORDER — LISINOPRIL 5 MG/1
15 TABLET ORAL DAILY
Status: CANCELLED | OUTPATIENT
Start: 2021-04-30

## 2021-04-29 RX ORDER — ONDANSETRON 2 MG/ML
INJECTION INTRAMUSCULAR; INTRAVENOUS AS NEEDED
Status: DISCONTINUED | OUTPATIENT
Start: 2021-04-29 | End: 2021-04-29 | Stop reason: HOSPADM

## 2021-04-29 RX ORDER — SODIUM CHLORIDE 0.9 % (FLUSH) 0.9 %
5-40 SYRINGE (ML) INJECTION AS NEEDED
Status: CANCELLED | OUTPATIENT
Start: 2021-04-29

## 2021-04-29 RX ORDER — HYDROCODONE BITARTRATE AND ACETAMINOPHEN 5; 325 MG/1; MG/1
1 TABLET ORAL
Status: CANCELLED | OUTPATIENT
Start: 2021-04-29

## 2021-04-29 RX ORDER — CLOPIDOGREL BISULFATE 75 MG/1
75 TABLET ORAL DAILY
Status: CANCELLED | OUTPATIENT
Start: 2021-04-30

## 2021-04-29 RX ADMIN — HYDROMORPHONE HYDROCHLORIDE 0.5 MG: 1 INJECTION, SOLUTION INTRAMUSCULAR; INTRAVENOUS; SUBCUTANEOUS at 15:54

## 2021-04-29 RX ADMIN — CEFAZOLIN SODIUM 2 G: 1 POWDER, FOR SOLUTION INTRAMUSCULAR; INTRAVENOUS at 12:11

## 2021-04-29 RX ADMIN — PROTAMINE SULFATE 5 MG: 10 INJECTION, SOLUTION INTRAVENOUS at 13:40

## 2021-04-29 RX ADMIN — PHENYLEPHRINE HYDROCHLORIDE 10 MCG/MIN: 10 INJECTION INTRAVENOUS at 12:50

## 2021-04-29 RX ADMIN — PROTAMINE SULFATE 5 MG: 10 INJECTION, SOLUTION INTRAVENOUS at 13:41

## 2021-04-29 RX ADMIN — VECURONIUM BROMIDE 8 MG: 10 INJECTION, POWDER, LYOPHILIZED, FOR SOLUTION INTRAVENOUS at 12:12

## 2021-04-29 RX ADMIN — Medication 10 MG: at 14:41

## 2021-04-29 RX ADMIN — MIDAZOLAM HYDROCHLORIDE 2 MG: 2 INJECTION, SOLUTION INTRAMUSCULAR; INTRAVENOUS at 12:09

## 2021-04-29 RX ADMIN — PROPOFOL 150 MG: 10 INJECTION, EMULSION INTRAVENOUS at 12:13

## 2021-04-29 RX ADMIN — HYDROCODONE BITARTRATE AND ACETAMINOPHEN 1 TABLET: 5; 325 TABLET ORAL at 20:27

## 2021-04-29 RX ADMIN — HEPARIN SODIUM 5000 UNITS: 1000 INJECTION, SOLUTION INTRAVENOUS; SUBCUTANEOUS at 12:36

## 2021-04-29 RX ADMIN — PROTAMINE SULFATE 5 MG: 10 INJECTION, SOLUTION INTRAVENOUS at 13:37

## 2021-04-29 RX ADMIN — FENTANYL CITRATE 50 MCG: 0.05 INJECTION, SOLUTION INTRAMUSCULAR; INTRAVENOUS at 14:07

## 2021-04-29 RX ADMIN — LIDOCAINE HYDROCHLORIDE 100 MG: 20 INJECTION, SOLUTION EPIDURAL; INFILTRATION; INTRACAUDAL; PERINEURAL at 13:54

## 2021-04-29 RX ADMIN — Medication 40 MCG: at 12:46

## 2021-04-29 RX ADMIN — FENTANYL CITRATE 50 MCG: 0.05 INJECTION, SOLUTION INTRAMUSCULAR; INTRAVENOUS at 12:13

## 2021-04-29 RX ADMIN — HEPARIN SODIUM 1000 UNITS: 1000 INJECTION, SOLUTION INTRAVENOUS; SUBCUTANEOUS at 13:26

## 2021-04-29 RX ADMIN — FENTANYL CITRATE 100 MCG: 0.05 INJECTION, SOLUTION INTRAMUSCULAR; INTRAVENOUS at 12:38

## 2021-04-29 RX ADMIN — PHENYLEPHRINE HYDROCHLORIDE 10 MCG/MIN: 10 INJECTION INTRAVENOUS at 15:18

## 2021-04-29 RX ADMIN — PROTAMINE SULFATE 5 MG: 10 INJECTION, SOLUTION INTRAVENOUS at 13:38

## 2021-04-29 RX ADMIN — PHENYLEPHRINE HYDROCHLORIDE 35 MCG/MIN: 10 INJECTION INTRAVENOUS at 20:45

## 2021-04-29 RX ADMIN — FENTANYL CITRATE 25 MCG: 0.05 INJECTION, SOLUTION INTRAMUSCULAR; INTRAVENOUS at 13:46

## 2021-04-29 RX ADMIN — PHENYLEPHRINE HYDROCHLORIDE 25 MCG/MIN: 10 INJECTION INTRAVENOUS at 19:45

## 2021-04-29 RX ADMIN — HEPARIN SODIUM 1000 UNITS: 1000 INJECTION, SOLUTION INTRAVENOUS; SUBCUTANEOUS at 12:53

## 2021-04-29 RX ADMIN — FENTANYL CITRATE 50 MCG: 0.05 INJECTION, SOLUTION INTRAMUSCULAR; INTRAVENOUS at 12:09

## 2021-04-29 RX ADMIN — SODIUM CHLORIDE, POTASSIUM CHLORIDE, SODIUM LACTATE AND CALCIUM CHLORIDE: 600; 310; 30; 20 INJECTION, SOLUTION INTRAVENOUS at 11:37

## 2021-04-29 RX ADMIN — LABETALOL HYDROCHLORIDE 10 MG: 5 INJECTION INTRAVENOUS at 14:08

## 2021-04-29 RX ADMIN — ATROPINE SULFATE 0.4 MG: 0.1 INJECTION PARENTERAL at 14:57

## 2021-04-29 RX ADMIN — PROPOFOL 50 MG: 10 INJECTION, EMULSION INTRAVENOUS at 12:14

## 2021-04-29 RX ADMIN — SODIUM CHLORIDE 1000 ML: 9 INJECTION, SOLUTION INTRAVENOUS at 20:37

## 2021-04-29 RX ADMIN — SODIUM CHLORIDE, POTASSIUM CHLORIDE, SODIUM LACTATE AND CALCIUM CHLORIDE 100 ML/HR: 600; 310; 30; 20 INJECTION, SOLUTION INTRAVENOUS at 11:03

## 2021-04-29 RX ADMIN — FENTANYL CITRATE 25 MCG: 0.05 INJECTION, SOLUTION INTRAMUSCULAR; INTRAVENOUS at 13:57

## 2021-04-29 RX ADMIN — PROTAMINE SULFATE 5 MG: 10 INJECTION, SOLUTION INTRAVENOUS at 13:39

## 2021-04-29 RX ADMIN — ONDANSETRON HYDROCHLORIDE 4 MG: 2 SOLUTION INTRAMUSCULAR; INTRAVENOUS at 13:53

## 2021-04-29 RX ADMIN — PHENYLEPHRINE HYDROCHLORIDE 50 MCG/MIN: 10 INJECTION INTRAVENOUS at 18:30

## 2021-04-29 NOTE — ANESTHESIA POSTPROCEDURE EVALUATION
Procedure(s):  RIGHT CAROTID ENDARTERECTOMY. general    Anesthesia Post Evaluation      Multimodal analgesia: multimodal analgesia not used between 6 hours prior to anesthesia start to PACU discharge  Patient location during evaluation: PACU  Patient participation: complete - patient participated  Level of consciousness: awake and alert  Pain score: 0  Pain management: satisfactory to patient  Airway patency: patent  Anesthetic complications: no  Cardiovascular status: acceptable  Respiratory status: acceptable  Hydration status: acceptable  Post anesthesia nausea and vomiting:  none  Final Post Anesthesia Temperature Assessment:  Normothermia (36.0-37.5 degrees C)      INITIAL Post-op Vital signs:   Vitals Value Taken Time   /47 04/29/21 1535   Temp 36.4 °C (97.5 °F) 04/29/21 1422   Pulse 69 04/29/21 1536   Resp 13 04/29/21 1536   SpO2 99 % 04/29/21 1536   Vitals shown include unvalidated device data.

## 2021-04-29 NOTE — PERIOP NOTES
14.40 HRS Patient heart rate slowing to mid 40,s to mid 30,s with assosciated hypotension.  Stat 12 lead EKG done follwed by aleida Anthony informed who ordered  ephedrine 10 mg iv stat,  With subsequent order for atropine 0.4mg, all given  See mar    14.47  informed who came to assess patient and ordered , phenylephrine and nicardipine drip, see mars for administrative I actions

## 2021-04-29 NOTE — PERIOP NOTES
Patient complaining incessantly of wanting to void despite prescence of conte catheter which is flowing satisfactorily, patient reassuired on several occassions that he was voiding by myself and supervisor Coretta Donahue. Patient lower abdomen was palpated without any tenderness or obvious distention, bladder scan however was performed to rule out possibility of  Delayed  Bladder emptying with negative results, DR Tri Luther was consulted and asked that conte catheter be discontinued. same was done and patient tolerated procedure well.

## 2021-04-29 NOTE — H&P
Vascular Surgery History & Physical    Subjective:     Dorene Weeks is a 63 y.o.  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    Sleep apnea 12/2011    doesn't use c-pap;  lost 25 lbs and has improved    Stroke [facility])     TIA  2021    Vertigo       Past Surgical History:   Procedure Laterality Date    HX CHOLECYSTECTOMY      HX COLONOSCOPY      HX ORTHOPAEDIC  1970    left wrist compound fracture    HX OTHER SURGICAL  2013    molar removal (dental)     Family History   Problem Relation Age of Onset    Cancer Father     Heart Disease Father     Heart Disease Mother     Kidney Disease Mother     Diabetes Brother       Social History     Tobacco Use    Smoking status: Current Every Day Smoker     Packs/day: 1.00     Years: 40.00     Pack years: 40.00    Smokeless tobacco: Never Used   Substance Use Topics    Alcohol use: No     Alcohol/week: 0.0 standard drinks       Prior to Admission medications    Medication Sig Start Date End Date Taking? Authorizing Provider   atorvastatin (LIPITOR) 40 mg tablet Take 1 Tab by mouth nightly for 30 days. 4/1/21 5/1/21 Yes Mamadou Izaguirre MD   clopidogreL (PLAVIX) 75 mg tab Take 1 Tab by mouth daily for 30 days. 4/2/21 5/2/21 Yes Mamadou Izaguirre MD   magnesium oxide (MAG-OX) 400 mg tablet Take 800 mg by mouth daily (with lunch).    Yes Provider, Historical   cholecalciferol (Vitamin D3) (1000 Units /25 mcg) tablet Take 2,000 Units by mouth daily (with lunch). Yes Provider, Historical   traMADoL (ULTRAM) 50 mg tablet Take 50 mg by mouth every six (6) hours as needed for Pain. Yes Provider, Historical   aspirin 81 mg chewable tablet Take 81 mg by mouth daily. 3/30/21  Yes Janelle Scherer NP   lisinopriL (PRINIVIL, ZESTRIL) 10 mg tablet Take 1.5 Tabs by mouth daily. 3/30/21  Yes Janelle Scherer NP   tamsulosin (FLOMAX) 0.4 mg capsule TAKE 1 CAPSULE BY MOUTH EVERY DAY 8/31/20  Yes Keene Severs, MD   cyanocobalamin (VITAMIN B-12) 1,000 mcg sublingual tablet Take 5,000 mcg by mouth every Monday, Wednesday, Friday. Yes Provider, Historical   fluticasone (FLONASE ALLERGY RELIEF) 50 mcg/actuation nasal spray 2 Sprays daily as needed. LEFT NARE   Yes Provider, Historical   valACYclovir (Valtrex) 500 mg tablet Take 2,000 mg by mouth once as needed. 12 hours after mouth prodrome dose    Provider, Historical     Allergies   Allergen Reactions    Sulfa (Sulfonamide Antibiotics) Anaphylaxis, Hives and Unknown (comments)    Sulfur Anaphylaxis, Hives and Seizures    Zyprexa [Olanzapine] Anaphylaxis    Celexa [Citalopram] Other (comments)     groggy    Clindamycin Nausea and Vomiting    Lisinopril Other (comments)     Denies allergy to this medication. Patient states he is currently taking.  Prozac [Fluoxetine] Anxiety    Risperidone Anxiety        Review of Systems:  Review of Systems   All other systems reviewed and are negative. Objective:     Patient Vitals for the past 24 hrs:   BP Temp Pulse Resp SpO2 Height Weight   04/29/21 1041 (!) 140/75 98 °F (36.7 °C) 69 18 100 % 5' 10\" (1.778 m) 86.2 kg (190 lb)       Physical Exam:   Physical Exam  Cardiovascular:      Rate and Rhythm: Normal rate and regular rhythm. Pulmonary:      Effort: Pulmonary effort is normal.      Breath sounds: Normal breath sounds. Data Review:   No results found for this or any previous visit (from the past 24 hour(s)). Assessment/Plan:      To OR for CEA    Signed By: Jeffy Altamirano MD     April 29, 2021

## 2021-04-29 NOTE — PERIOP NOTES
A-line discontinued as per protocol, no swelling or bleeding observed. Patient tolerated procedure well.

## 2021-04-29 NOTE — ANESTHESIA PREPROCEDURE EVALUATION
Anesthetic History   No history of anesthetic complications            Review of Systems / Medical History  Patient summary reviewed, nursing notes reviewed and pertinent labs reviewed    Pulmonary        Sleep apnea           Neuro/Psych     seizures    TIA and psychiatric history  Pertinent negatives: No CVA   Cardiovascular    Hypertension          CAD         GI/Hepatic/Renal     GERD  Hepatitis    PUD and liver disease     Endo/Other        Blood dyscrasia (polycythemia vera, biweekly phlebotomy) and arthritis     Other Findings   Comments: Polycythemia . .. gets regular venesection, to keep HCT < 45.  Has thrombocytosis          Physical Exam    Airway  Mallampati: II  TM Distance: 4 - 6 cm  Neck ROM: normal range of motion   Mouth opening: Normal     Cardiovascular    Rhythm: regular  Rate: normal         Dental  No notable dental hx       Pulmonary  Breath sounds clear to auscultation               Abdominal  GI exam deferred       Other Findings            Anesthetic Plan    ASA: 3  Anesthesia type: general    Monitoring Plan: Arterial line      Induction: Intravenous  Anesthetic plan and risks discussed with: Patient

## 2021-04-30 VITALS
HEART RATE: 59 BPM | BODY MASS INDEX: 27.2 KG/M2 | DIASTOLIC BLOOD PRESSURE: 57 MMHG | HEIGHT: 70 IN | WEIGHT: 190 LBS | OXYGEN SATURATION: 99 % | TEMPERATURE: 98.7 F | RESPIRATION RATE: 19 BRPM | SYSTOLIC BLOOD PRESSURE: 107 MMHG

## 2021-04-30 LAB
ATRIAL RATE: 42 BPM
CALCULATED P AXIS, ECG09: 72 DEGREES
CALCULATED R AXIS, ECG10: 62 DEGREES
CALCULATED T AXIS, ECG11: 61 DEGREES
DIAGNOSIS, 93000: NORMAL
P-R INTERVAL, ECG05: 152 MS
Q-T INTERVAL, ECG07: 482 MS
QRS DURATION, ECG06: 82 MS
QTC CALCULATION (BEZET), ECG08: 402 MS
VENTRICULAR RATE, ECG03: 42 BPM

## 2021-04-30 PROCEDURE — 74011250636 HC RX REV CODE- 250/636: Performed by: SURGERY

## 2021-04-30 PROCEDURE — 74011250636 HC RX REV CODE- 250/636

## 2021-04-30 PROCEDURE — 2709999900 HC NON-CHARGEABLE SUPPLY

## 2021-04-30 PROCEDURE — 74011250637 HC RX REV CODE- 250/637: Performed by: SURGERY

## 2021-04-30 RX ORDER — MORPHINE SULFATE 4 MG/ML
3 INJECTION INTRAVENOUS
Status: DISCONTINUED | OUTPATIENT
Start: 2021-04-30 | End: 2021-04-30 | Stop reason: HOSPADM

## 2021-04-30 RX ORDER — OXYCODONE AND ACETAMINOPHEN 5; 325 MG/1; MG/1
1 TABLET ORAL
Status: DISCONTINUED | OUTPATIENT
Start: 2021-04-30 | End: 2021-04-30 | Stop reason: HOSPADM

## 2021-04-30 RX ORDER — HYDROCODONE BITARTRATE AND ACETAMINOPHEN 5; 325 MG/1; MG/1
2 TABLET ORAL
Qty: 20 TAB | Refills: 0 | Status: SHIPPED | OUTPATIENT
Start: 2021-04-30 | End: 2021-05-03

## 2021-04-30 RX ORDER — SODIUM CHLORIDE, SODIUM LACTATE, POTASSIUM CHLORIDE, CALCIUM CHLORIDE 600; 310; 30; 20 MG/100ML; MG/100ML; MG/100ML; MG/100ML
100 INJECTION, SOLUTION INTRAVENOUS CONTINUOUS
Status: DISCONTINUED | OUTPATIENT
Start: 2021-04-30 | End: 2021-04-30 | Stop reason: HOSPADM

## 2021-04-30 RX ADMIN — OXYCODONE HYDROCHLORIDE AND ACETAMINOPHEN 1 TABLET: 5; 325 TABLET ORAL at 03:16

## 2021-04-30 RX ADMIN — SODIUM CHLORIDE, POTASSIUM CHLORIDE, SODIUM LACTATE AND CALCIUM CHLORIDE 100 ML/HR: 600; 310; 30; 20 INJECTION, SOLUTION INTRAVENOUS at 11:47

## 2021-04-30 RX ADMIN — MORPHINE SULFATE 3 MG: 4 INJECTION, SOLUTION INTRAMUSCULAR; INTRAVENOUS at 01:08

## 2021-04-30 RX ADMIN — PHENYLEPHRINE HYDROCHLORIDE 35 MCG/MIN: 10 INJECTION INTRAVENOUS at 04:28

## 2021-04-30 RX ADMIN — OXYCODONE HYDROCHLORIDE AND ACETAMINOPHEN 1 TABLET: 5; 325 TABLET ORAL at 07:34

## 2021-04-30 RX ADMIN — SODIUM CHLORIDE, POTASSIUM CHLORIDE, SODIUM LACTATE AND CALCIUM CHLORIDE 100 ML/HR: 600; 310; 30; 20 INJECTION, SOLUTION INTRAVENOUS at 01:15

## 2021-04-30 NOTE — PERIOP NOTES
Ngoc Vergara informed of post op status in pacu re continuing low blood pressures and low heart rate, see  Flowchart, asked to give normal saline bolus 1000mls, check H&H, LORTAB 5MG PO Q4RLY PRN,and to mohan if no significant improvement

## 2021-04-30 NOTE — PROGRESS NOTES
Spiritual Care Assessment/Progress Note  [address]      NAME: Diogo Kinney      MRN: [id number]  AGE: 63 y.o.  SEX: male  Protestant Affiliation: Protestant   Language: English     4/30/2021     Total Time (in minutes): 11     Spiritual Assessment begun in [facility] through conversation with:         [x]Patient        [] Family    [] Friend(s)        Reason for Consult: Initial/Spiritual assessment, critical care     Spiritual beliefs: (Please include comment if needed)     [x] Identifies with a moses tradition: Protestant       [] Supported by a msoes community:            [] Claims no spiritual orientation:           [] Seeking spiritual identity:                [] Adheres to an individual form of spirituality:           [] Not able to assess:                           Identified resources for coping:      [x] Prayer                               [] Music                  [] Guided Imagery     [x] Family/friends                 [] Pet visits     [] Devotional reading                         [] Unknown     [] Other:                                              Interventions offered during this visit: (See comments for more details)    Patient Interventions: Affirmation of emotions/emotional suffering, Affirmation of moses, Iconic (affirming the presence of God/Higher Power), Prayer (assurance of), Initial/Spiritual assessment, Critical care, Coping skills reviewed/reinforced, Normalization of emotional/spiritual concerns, Protestant beliefs/image of God discussed           Plan of Care:     [] Support spiritual and/or cultural needs    [] Support AMD and/or advance care planning process      [] Support grieving process   [] Coordinate Rites and/or Rituals    [] Coordination with community clergy   [] No spiritual needs identified at this time   [] Detailed Plan of Care below (See Comments)  [] Make referral to Music Therapy  [] Make referral to Pet Therapy     [] Make referral to Addiction services  [] Make referral to [facility]  [] Make referral to Spiritual Care Partner  [] No future visits requested        [x] Follow up upon further referrals     Comments: 's visit was in the ICU for critical care initial spiritual assessment. Pt, Mr. Joanie Jacobson, was in his chair and appeared comfortable. He welcomed  warmly and engaged in conversation about his current medical condition. He stated he was doing much better, expressing appreciation to staff for wonderful care.  explored coping resources, affirmed thoughts and emotions and offered assurance of prayer. Pt seemed uplifted by the visit and thanked  for stopping by. Spiritual care is still available for support upon further referral.     Visited by: Jefferson Solomon.    Paging Service: [phone])

## 2021-04-30 NOTE — PROGRESS NOTES
0700- Bedside and Verbal shift change report given to [address] (oncoming nurse) by Elisabeth Guerra RN (offgoing nurse). Report included the following information SBAR, Kardex, ED Summary, OR Summary, Procedure Summary, Intake/Output, MAR, Accordion, Recent Results, Med Rec Status, Cardiac Rhythm SB and Alarm Parameters . Primary Nurse Irais Ruiz and RUSH Arcos performed a dual skin assessment on this patient Impairment noted- see wound doc flow sheet  Jesus score is 18   Shift assessment completed. Drips verified. Pt AOx4, no s/s of distress, pain complaint post surgical 5/10 R side neck. See MAR for pain medication administration. Call bell within reach. Pt. Elijah Guevara to make needs known. STAND performed and passed in order to take medications ordered PO.   1200- Pt. Reassessment performed; no changes from previous assessment. 1300- Pt. In chair with chair alarm on and call bell with in reach   1500- Pt lines removed, predischarge vitals taken, discharge instructions reviewed. If you experience chest pain call 911. Do not drive yourself. Pt. Discharged with all personal belongings in wheelchair with tech.

## 2021-04-30 NOTE — DISCHARGE INSTRUCTIONS
Patient Discharge Instructions    larry Mart / [id number] : 1955    Admitted 2021 Discharged: 2021     Take Home Medications            · It is important that you take the medication exactly as they are prescribed. · Keep your medication in the bottles provided by the pharmacist and keep a list of the medication names, dosages, and times to be taken in your wallet. · Do not take other medications without consulting your doctor. What to do at Home    Recommended diet: Regular Diet,     Recommended activity: Activity as tolerated,      Follow-up with Dr Art Swann in 2 weeks        Information obtained by :  I understand that if any problems occur once I am at home I am to contact my physician. I understand and acknowledge receipt of the instructions indicated above.                                                                                                                                            Physician's or R.N.'s Signature                                                                  Date/Time                                                                                                                                              Patient or Representative Signature                                                          Date/Time

## 2021-04-30 NOTE — PERIOP NOTES
POST ANESTHESIA CARE    DISCHARGE / TRANSFER NOTE  TRANSFER - OUT REPORT:    PHONE  report  WAS     given at 11:40 PM to   Kathrine Price  receiving   Makaleja Domi   and being transferred to   [facility]     for routine post - op  Following General for Procedure(s) (LRB):  RIGHT CAROTID ENDARTERECTOMY (Right) by  Jas Lugo MD.    Patient Situation, Background, Assessment and Recommendations (SBAR) was provided and included information from the following SBAR report(s) (SBAR, OR Summary, MAR, Recent Results, Med Rec Status and Cardiac Rhythm SB - 50's) which were reviewed with the receiving nurse. Lines:   Peripheral IV 04/29/21 Left Hand (Active)   Site Assessment Clean, dry, & intact 04/29/21 2220   Phlebitis Assessment 0 04/29/21 2220   Infiltration Assessment 0 04/29/21 2220   Dressing Status Clean, dry, & intact 04/29/21 2220   Dressing Type Transparent;Tape 04/29/21 2220   Hub Color/Line Status Patent; Infusing 04/29/21 2220   Alcohol Cap Used Yes 04/29/21 1102       Peripheral IV 04/29/21 Posterior;Right Hand (Active)   Site Assessment Clean, dry, & intact 04/29/21 2220   Phlebitis Assessment 0 04/29/21 2220   Infiltration Assessment 0 04/29/21 2220   Dressing Status Clean, dry, & intact 04/29/21 2220   Dressing Type Transparent;Tape 04/29/21 2220   Hub Color/Line Status Patent;Capped 04/29/21 2220      Also Reviewed (checked if applicable):   [] NO ADDITIONAL REVIEWS  [] PCA Drug and Settings     [] Cold Therapy with extra gels     [] On-Q @  ml/hr   [x] Drains x 1 (TRA - neck)      [] Significant Wound care/devices (e.g. Wound vac, etc.)     [] Holding pt in PACU awaiting bed availability - additional care provided and eMAR review  [] Vent Settings      [x] Critical Care Drips - JUDITH at 35mcg  Contact Precautions: There are currently no Active Isolations  Patient transported with:  Monitor  Registered Nurse    Bran Duron was   transferred   via   Bed   to     hospital room IVCU 3  .      Patient was escorted by     nurse  . Patient verbalized   appreciation and was very pleased with care received    throughout their stay. Patient was discharged in     pleasant mood   . Pain at discharge/transfer was      3-4  /10. Discharge, medication and follow-up instructions were verbalized as understood prior to discharge  (if applicable for same-day procedures being discharged.)    All personal belongings have been returned to patient, and patient/family verbally confirm receiving belongings as all present. Additional Information: Interval bedside SBAR report was completed at [facility] 4/30. There were:  [x] No changes to patient condition since last assessment and/or communication with receiving RN.    [] There were changes to patient care/condition since last communication with receiving RN and were reviewed at        verbal bedside SBAR change of staff. Patient is in:   [x] No Acute Distress     [] Mild/Moderate Acute discomfort - treated and controlled  [] Acute dicomfort/distress - treated but still not fully controlled  [] Acute dicomfort/distress reported, however maximum allowable dosing has been achieved and no further        doses allowed    Vital Signs are: [x] Stable - consistent with end of PACU care. [] Unstable -  receiving continued care in appropriate setting    [] 2 RN skin check completed with receiving RN.  [] Spouse/family/caregiver at bedside during/after staff handoff of care. [x] No Spouse/family/caregiver with the patient at this time.   OF NOTE: family is quite concerned about low BP and previous reports (from patient with wife/daugter via cell phone) about feeling hot, pain, \"blood in urine\", \"maybe I'm septic,\" etc.  Lengthy phone call to update to inform of transfer to IVCU 3, and assurance provided/questions answered, that there was no visible blood in urine (was idania and appeared concentrated) and he is not septic (has no fever, and BP symptoms consistent with  anticipated blood loss from surgery, carotid disease and surgical interventions), and despite being on some vasopressors he appears doing well and in good spirits, pain is controlled and he is resting comfortably, and that he will be continually monitored through the night in the intensive care setting. They sounded reassured and verbalized understanding. Desire a phone call with surgeon in AM. Receiving RN informed of the family concerns at the bedside and patent aware of the family conversation via phone update prior to transport. After report, opportunity for questions and clarification was provided.     Signed: [facility] BSN RN-BC

## 2021-04-30 NOTE — PROGRESS NOTES
4/30/2021  10:46 AM  CM completed assessment w/ pt in person, CM wore mask and goggles at all times. Charted demographics verified. Reason for Readmission:     Rt Carotid Artery Stenosis   3/31-4/1/21 Admission for R/O CVA, neuro and vascular consults, pt DC to home w/ outpatient f/u   Pt is a 64 yo  male who presents to [facility] for planned Rt Carotid Endarterectomy  PMHx: HTN, HLD, depression,Bipolar I, LAINE, Gout, BPH,GERD, polycythemia vera     RUR Score/Risk Level:     9% Low Risk/Green    PCP: First and Last name:  Wayne Najera MD Name of Practice: [facility]   Are you a current patient: Yes/No: Yes   Approximate date of last visit: 3 weeks   Can you participate in a virtual visit with your PCP: YES    Is a Care Conference indicated: NO      Did you attend your follow up appointment (s): If not, why not:pt attended PCP and vascular neurology is scheduled for 5/5/21         Resources/supports as identified by patient/family:     Pt has supportive wife, however travels frequently for work, has friends/neighbors who can assist    Top Challenges facing patient (as identified by patient/family and CM): Finances/Medication cost?   [address], pt reports no difficulty affording medications  Transportation  pt drives, wife and neighbors can assist      Support system or lack thereof? Pt has supportive wife, however she frequently travels for work, has neighbors and friends who can assist   Living arrangements? Pt lives w/ wife Weston Ast (S) [phone]  In 2 story home, there are 4 entry steps and 6 interior steps to bed/bath      Self-care/ADLs/Cognition?      Pt can perform ADLS independetly       Current Advanced Directive/Advance Care Plan:    [facility] wife Bill Ast (C) [phone]          Plan for utilizing home health:   No history, pt is indep w/ all ADLs             Transition of Care Plan:    Based on readmission, the patient's previous Plan of Care   has been evaluated and/or modified. The current Transition of Care Plan is:         1. Hospital admission for vascular surgery/ Rt Carotid Endarterctomy  2. CM to follow through for treatment/response  3. DC when stable to home w family assistance, pt lives w/ wife, iADLs  4. Outpatient f/u vascular, PCP, neurology  5. [facility] resources  7.  Wife will transport    Care Management Interventions  PCP Verified by CM: Perez Uribe MD last visit 3 weeks)  Palliative Care Criteria Met (RRAT>21 & CHF Dx)?: No  Mode of Transport at Discharge: Self(wife)  Physical Therapy Consult: No  Occupational Therapy Consult: No  Speech Therapy Consult: No  Current Support Network: Lives with Spouse, Own Home(pt lvies w/ wife in pvt residence, at baseline tp is ambulatory, iADLs, drives, wife is able to assist w/ transport)  Confirm Follow Up Transport: Family  Discharge Location  Discharge Placement: Home with outpatient services  Readmission Assessment  Number of days since last admission?: 8-30 days  Previous disposition: Home with Family  Who is being interviewed?: Patient  What was the patient's/caregiver's perception as to why they think they needed to return back to the hospital?: Other (Comment)(Planned Rt Carotid endarterectomy by Dr Randell Dave)  Did you visit your Primary Care Physician after you left the hospital, before you returned this time?: Yes(tele health visit)  Did you see a specialist, such as Cardiac, Pulmonary, Orthopedic Physician, etc. after you left the hospital?: Yes(vascular, neurology is scheduled for 5/5/21)  Who advised the patient to return to the hospital?: Other (Comment)(planned vascular procedure)  Does the patient report anything that got in the way of taking their medications?: No(pt was able to fill all Rx)  In our efforts to provide the best possible care to you and others like you, can you think of anything that we could have done to help you after you left the hospital the first time, so that you might not have needed to return so soon?: Other (Comment)(No)  Claudeen Nation, BS

## 2021-05-02 NOTE — OP NOTES
Rafita Barnes [facility] 81  OPERATIVE REPORT    Name:  Ermelinda Snell  MR#:  [id number]  :  1955  ACCOUNT #:  [de-identified]  DATE OF SERVICE:  2021    PREOPERATIVE DIAGNOSIS:  Right carotid stenosis. POSTOPERATIVE DIAGNOSIS:  Right carotid stenosis. PROCEDURE PERFORMED:  Right carotid endarterectomy. SURGEON:  Kayla Mariee MD    ASSISTANT:  None. ANESTHESIA:  General endotracheal anesthesia. COMPLICATIONS:  None. SPECIMENS REMOVED:  None. IMPLANTS:  None. ESTIMATED BLOOD LOSS:  Minimal.    INDICATION FOR PROCEDURE:  The patient is a 31-year-old male with symptomatic high-grade right carotid stenosis. Decision was made to take him to the operating room for endarterectomy. PROCEDURE:  The patient was taken to the operating room. Once a suitable level of anesthesia was introduced, he was prepped and draped in typical sterile fashion. Oblique incision was made in the right neck and dissection carried out down to the carotid artery which was dissected free of its soft tissue attachments. The patient was systemically heparinized and the vessel controlled proximally and distally. The vessel was opened longitudinally and a 12-Tamazight [place] shunt placed. Flow through the shunt was confirmed with continuous wave Doppler. Next, a [place] elevator was used to perform an endarterectomy. Distal plaque feathered nicely without need for tacking suture. Next, patch angioplasty was performed and secured in place with 5-0 Prolene suture. Shunt was removed prior to completion of the patch. Flow was then restored from the common out the external carotid artery prior to restoration of antegrade flow to the brain. Wound was irrigated thoroughly, closed in multiple layers. He tolerated the procedure well. Sponge and instrument counts were correct x2. He was awakened and transferred to recovery area in good condition, confirmed to be neurologically intact.       Kourtney Michelle MD [id number]  D:  05/02/2021 11:32  T:  05/02/2021 17:41  JOB #:  [id number]

## 2021-05-05 DIAGNOSIS — M10.9 GOUT, UNSPECIFIED CAUSE, UNSPECIFIED CHRONICITY, UNSPECIFIED SITE: Primary | ICD-10-CM

## 2021-05-06 NOTE — DISCHARGE SUMMARY
[facility] SUMMARY    Name:  Farida Do  MR#:  [id number]  :  1955  ACCOUNT #:  [de-identified]  ADMIT DATE:  2021  DISCHARGE DATE:  2021      ADMISSION DIAGNOSIS:  Carotid stenosis. DISCHARGE DIAGNOSIS:  Carotid stenosis. PROCEDURE:  Carotid endarterectomy done by me on the day of admission. DETAILS OF ADMISSION/HOSPITAL COURSE:  The patient is a middle-aged male with high-grade carotid stenosis. Decision was made to take him to the operating room for endarterectomy. This was done without difficulty or complication. Postoperative course was unremarkable. On the day of discharge, he was doing well, sent home in good condition. DISPOSITION:  Home. CONDITION ON DISCHARGE:  Good.       Eleuterio Leyva MD      [id number]  D:  2021 8:48  T:  2021 12:56  JOB #:  [id number]

## 2021-05-24 RX ORDER — COLCHICINE 0.6 MG/1
CAPSULE ORAL
Qty: 60 CAPSULE | Refills: 1 | Status: SHIPPED | OUTPATIENT
Start: 2021-05-24 | End: 2021-06-07 | Stop reason: SDUPTHER

## 2021-06-07 RX ORDER — COLCHICINE 0.6 MG/1
CAPSULE ORAL
Qty: 60 CAPSULE | Refills: 1 | Status: SHIPPED | OUTPATIENT
Start: 2021-06-07 | End: 2021-08-30 | Stop reason: ALTCHOICE

## 2021-06-24 RX ORDER — VALACYCLOVIR HYDROCHLORIDE 500 MG/1
TABLET, FILM COATED ORAL
Qty: 20 TABLET | Refills: 5 | Status: SHIPPED | OUTPATIENT
Start: 2021-06-24 | End: 2021-08-30 | Stop reason: ALTCHOICE

## 2021-07-07 RX ORDER — CEPHALEXIN 500 MG/1
500 CAPSULE ORAL 3 TIMES DAILY
Qty: 30 CAPSULE | Refills: 0 | Status: SHIPPED | OUTPATIENT
Start: 2021-07-07 | End: 2022-09-26

## 2021-07-21 ENCOUNTER — TELEPHONE (OUTPATIENT)
Dept: FAMILY MEDICINE CLINIC | Age: 66
End: 2021-07-21

## 2021-07-21 NOTE — TELEPHONE ENCOUNTER
[facility] office called in and is asking if you could send them any office notes that has to do with the pts rash he is being seen for tomorrow. Fax number for them is [phone]. Thanks.

## 2021-07-21 NOTE — TELEPHONE ENCOUNTER
Spoke with pt, informed him that psr sent a message that derm office was requesting office noted on rash. I asked pt if he had been seen here for the rash, he states that he was seen in the hospital for this and that the surgeon noticed this. I informed him that he would have to contact the surgeon then as we don't have documentation on this rash.  He verbalized understanding

## 2021-07-22 NOTE — TELEPHONE ENCOUNTER
Received call from [facility] at dermatology assoc about medica records. I read her the note from nurse that she called pt and we did not refer to them and that pt was to call the surgeon office to get the records from them.

## 2021-07-26 ENCOUNTER — TELEPHONE (OUTPATIENT)
Dept: FAMILY MEDICINE CLINIC | Age: 66
End: 2021-07-26

## 2021-07-26 NOTE — TELEPHONE ENCOUNTER
Please advise wife talked with Lavinia Benavides on my chart and said to call and make an appt , I don't know where to fit him in at

## 2021-08-30 ENCOUNTER — OFFICE VISIT (OUTPATIENT)
Dept: FAMILY MEDICINE CLINIC | Age: 66
End: 2021-08-30
Payer: COMMERCIAL

## 2021-08-30 VITALS
SYSTOLIC BLOOD PRESSURE: 156 MMHG | DIASTOLIC BLOOD PRESSURE: 79 MMHG | TEMPERATURE: 98.6 F | OXYGEN SATURATION: 99 % | HEART RATE: 55 BPM | BODY MASS INDEX: 27.14 KG/M2 | RESPIRATION RATE: 16 BRPM | WEIGHT: 189.6 LBS | HEIGHT: 70 IN

## 2021-08-30 DIAGNOSIS — Z87.891 PERSONAL HISTORY OF TOBACCO USE, PRESENTING HAZARDS TO HEALTH: Primary | ICD-10-CM

## 2021-08-30 DIAGNOSIS — E78.00 HYPERCHOLESTEROLEMIA: ICD-10-CM

## 2021-08-30 DIAGNOSIS — Z11.59 ENCOUNTER FOR HEPATITIS C SCREENING TEST FOR LOW RISK PATIENT: ICD-10-CM

## 2021-08-30 PROCEDURE — 99213 OFFICE O/P EST LOW 20 MIN: CPT | Performed by: NURSE PRACTITIONER

## 2021-08-30 NOTE — PROGRESS NOTES
Gray Brunner is a 61 y.o. male    Chief Complaint   Patient presents with    New Order     Xray order for lung cancer.  Labs         1. Have you been to the ER, urgent care clinic since your last visit? Hospitalized since your last visit? No    2. Have you seen or consulted any other health care providers outside of the [address] [place] [address] since your last visit? Include any pap smears or colon screening.  No

## 2021-08-30 NOTE — PROGRESS NOTES
HISTORY OF PRESENT ILLNESS  Emigdio Yuan is a 61 y.o. male. HPI  Patient is due for chol check  Not actively taking Lipitor, wants to get lab down with herbal otc instead  Does not want to take a \"forever pill\", even though he just had right carotid endarterectomy done by Dr. Jazmin Malhotra in July    He also wants to do a low dose CT chest for his history of smoking    ROS  A comprehensive review of system was obtained and negative except findings in the HPI    Visit Vitals  BP (!) 156/79 (BP 1 Location: Left upper arm, BP Patient Position: Sitting, BP Cuff Size: Small adult)   Pulse (!) 55   Temp 98.6 °F (37 °C) (Oral)   Resp 16   Ht 5' 10\" (1.778 m)   Wt 189 lb 9.6 oz (86 kg)   SpO2 99%   BMI 27.20 kg/m²     Physical Exam  Vitals and nursing note reviewed. Constitutional:       Appearance: Normal appearance. Cardiovascular:      Rate and Rhythm: Normal rate and regular rhythm. Heart sounds: Normal heart sounds. Pulmonary:      Breath sounds: Normal breath sounds. Musculoskeletal:         General: No swelling. Skin:     Comments: Well healed surgical scar right anterior neck   Neurological:      Mental Status: He is alert. ASSESSMENT and PLAN  Encounter Diagnoses   Name Primary?  Personal history of tobacco use, presenting hazards to health Yes    Hypercholesterolemia     Encounter for hepatitis C screening test for low risk patient      Orders Placed This Encounter    Low Dose CT for Lung Cancer Screening    LIPID PANEL    HEPATITIS C AB     Labs updated  CT ordered  Dev plan with results  I have discussed the diagnosis with the patient and the intended plan as seen in the above orders. The patient has received an after-visit summary and questions were answered concerning future plans. Patient conveyed understanding of the plan at the time of the visit.     Apolinar Jimenez, MSN, ANP  8/30/2021

## 2021-08-30 NOTE — PROGRESS NOTES
Discussed with patient current guidelines for screening for lung cancer. Current recommendations are to obtain yearly screening LDCT yearly for age 46-80, or until smoke free for 15 years. Patient has 30+ pack year history of cigarette smoking and currently still smokes. Discussed with patient risks and benefits of screening, including over-diagnosis, false positive rate, and total radiation exposure. Patient currently exhibits no signs or symptoms suggestive of lung cancer. Discussed with patient importance of compliance with yearly annual lung cancer screenings and willingness to undergo diagnosis and treatment if screening scan is positive. In addition, patient was counseled regarding (remaining smoke free/total smoking cessation).

## 2021-08-31 LAB
CHOLEST SERPL-MCNC: 108 MG/DL (ref 100–199)
HCV AB S/CO SERPL IA: 0.2 S/CO RATIO (ref 0–0.9)
HDLC SERPL-MCNC: 33 MG/DL
IMP & REVIEW OF LAB RESULTS: NORMAL
LDLC SERPL CALC-MCNC: 62 MG/DL (ref 0–99)
TRIGL SERPL-MCNC: 59 MG/DL (ref 0–149)
VLDLC SERPL CALC-MCNC: 13 MG/DL (ref 5–40)

## 2021-09-16 ENCOUNTER — HOSPITAL ENCOUNTER (OUTPATIENT)
Dept: CT IMAGING | Age: 66
Discharge: HOME | End: 2021-09-16
Attending: NURSE PRACTITIONER
Payer: COMMERCIAL

## 2021-09-16 VITALS — BODY MASS INDEX: 26.2 KG/M2 | HEIGHT: 70 IN | WEIGHT: 183 LBS

## 2021-09-16 DIAGNOSIS — Z87.891 PERSONAL HISTORY OF TOBACCO USE, PRESENTING HAZARDS TO HEALTH: ICD-10-CM

## 2021-09-16 PROCEDURE — 71271 CT THORAX LUNG CANCER SCR C-: CPT

## 2021-11-17 RX ORDER — TAMSULOSIN HYDROCHLORIDE 0.4 MG/1
0.4 CAPSULE ORAL DAILY
Qty: 90 CAPSULE | Refills: 3 | Status: SHIPPED | OUTPATIENT
Start: 2021-11-17 | End: 2022-10-10

## 2021-12-24 RX ORDER — CIPROFLOXACIN HYDROCHLORIDE AND HYDROCORTISONE 2; 10 MG/ML; MG/ML
SUSPENSION AURICULAR (OTIC)
Qty: 10 ML | Refills: 1 | Status: SHIPPED | OUTPATIENT
Start: 2021-12-24 | End: 2022-02-07

## 2022-01-26 ENCOUNTER — TELEPHONE (OUTPATIENT)
Dept: ONCOLOGY | Age: 67
End: 2022-01-26

## 2022-01-26 NOTE — TELEPHONE ENCOUNTER
Patient would like to transfer care from the [facility] if we can make sure when he get his phelobotomy that a small needle can be uses like an IV needle. He would like a call back in this matter    Please advises when you would like to see him?

## 2022-01-27 NOTE — TELEPHONE ENCOUNTER
Returned call to Mr. Sophia Dodson, [phone]. ID verified. Per patient he is former OPI patient and seen by Provider in past.  Has been under care of [place] system but would like to come back to [facility] with conditions. States has poor vasculature, requires phlebotomies, and cannot have his treatment \"done with horse needles like they use in ([facility]) infusion center. \" Wants phlebotomy done via IV needle. States that the [place] also uses a \"vein finder\" for him, as well. Explained that patient would need to contact [facility] to discuss their policy for phlebotomy, that our office did not order supplies for them. Offered to transfer call or supply phone number, declined. States he has discussed with Manager Sudheer Valenzuela and was informed they could not change procedure for 1 patient. States would like to come back to Provider's sevrvice and have care under one umbrella, but would need Dr to write orders for how phlebotomy is to be performed with IV needle. Understands that RN will route message to Provider and [facility] but that Provider may not be able to order his treatment in this manner.

## 2022-01-28 NOTE — TELEPHONE ENCOUNTER
Patient called regarding transfer of care. Patient would like a call back with an update. Please follow up with the patient.

## 2022-01-31 NOTE — TELEPHONE ENCOUNTER
Follow up call to patient, ID verified X 2. Updated with Provider recommendations that since he was established with [facility] and they were performing phlebotomy in his preferred manner he should remain their patient. Explained that we were unable to make changes to current [facility] established processes for treatment. Understanding verbalized.     To Provider

## 2022-02-07 ENCOUNTER — OFFICE VISIT (OUTPATIENT)
Dept: CARDIOLOGY CLINIC | Age: 67
End: 2022-02-07
Payer: COMMERCIAL

## 2022-02-07 ENCOUNTER — CLINICAL SUPPORT (OUTPATIENT)
Dept: CARDIOLOGY CLINIC | Age: 67
End: 2022-02-07
Payer: COMMERCIAL

## 2022-02-07 VITALS
HEART RATE: 68 BPM | BODY MASS INDEX: 26.43 KG/M2 | HEIGHT: 70 IN | SYSTOLIC BLOOD PRESSURE: 210 MMHG | DIASTOLIC BLOOD PRESSURE: 100 MMHG | WEIGHT: 184.6 LBS

## 2022-02-07 DIAGNOSIS — I10 ESSENTIAL HYPERTENSION, BENIGN: Primary | ICD-10-CM

## 2022-02-07 DIAGNOSIS — R00.2 PALPITATION: Primary | ICD-10-CM

## 2022-02-07 PROCEDURE — 93000 ELECTROCARDIOGRAM COMPLETE: CPT | Performed by: SPECIALIST

## 2022-02-07 PROCEDURE — 93227 XTRNL ECG REC<48 HR R&I: CPT | Performed by: SPECIALIST

## 2022-02-07 PROCEDURE — 93225 XTRNL ECG REC<48 HRS REC: CPT | Performed by: SPECIALIST

## 2022-02-07 PROCEDURE — 99204 OFFICE O/P NEW MOD 45 MIN: CPT | Performed by: SPECIALIST

## 2022-02-07 RX ORDER — COLCHICINE 0.6 MG/1
0.6 TABLET ORAL DAILY
COMMUNITY

## 2022-02-07 NOTE — PROGRESS NOTES
CARDIOLOGY OFFICE NOTE    Carlos Kat MD, [address], [place], [address] [facility]  Phone [phone]; Fax [phone]  Mobile [phone]   Voice Mail [phone]        HISTORY OF PRESENTING ILLNESS      Moises Newman is a 61 y.o. male palpitations and shortness of breath as well as hypertension. Cardiac risk factors: tobacco abuse, hypertension, male gender. I personally obtained the records from the patient. He is describing palpitations he feels is related to a gout medicine he was taking believe it was allopurinol. He stopped that and his symptoms have improved. He continues to smoke. He did have carotid surgery in 2021 and also has polycythemia vera. Does have LAINE but has not had been retested for sleep machine. Did smoke before he came in which his blood pressure.     Carotid surgery 4/2021  Polycythemia vera    ECG is normal sinus rhythm   ACTIVE PROBLEM LIST     Patient Active Problem List    Diagnosis Date Noted    Carotid artery disease ([address] 04/29/2021    Stenosis of carotid artery 04/07/2021    CVA (cerebral vascular accident) ([address] 03/31/2021    Gout 08/26/2019    Bipolar 1 disorder ([address] 05/02/2018    Benign prostatic hyperplasia 08/22/2017    Chronic nonintractable headache 08/22/2016    Anxiety and depression 08/22/2016    Vertigo 08/22/2016    Gallstones 04/08/2014    Ringing in ears 08/12/2013    Fatigue 08/12/2013    Polycythemia vera ([address] 04/29/2013    Mixed hyperlipidemia 06/12/2012    LAINE (obstructive sleep apnea) 02/01/2012    Essential hypertension, benign 06/15/2011    Depression            PAST MEDICAL HISTORY     Past Medical History:   Diagnosis Date    Arthritis     GOUT    CAD (coronary artery disease)     CAROTID ENDARTERECTOMY 4/29/21    Coagulation disorder (HCC)     PLATELET COUNTS ELEVATED    Depression     GERD (gastroesophageal reflux disease)     Hypertension     Liver disease 1984    Fatty liver    Other ill-defined conditions(799.89)     gallstones    Polycythemia vera(238.4) 4/29/2013    Prostatitis     PUD (peptic ulcer disease)     1980 POSSIBLE DUDENAL ULCER PER PT    Seizures ([address]     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    Sleep apnea 12/2011    doesn't use c-pap;  lost 25 lbs and has improved    Stroke [facility]     TIA  2021    Vertigo            PAST SURGICAL HISTORY     Past Surgical History:   Procedure Laterality Date    HX CHOLECYSTECTOMY      HX COLONOSCOPY      HX ORTHOPAEDIC  1970    left wrist compound fracture    HX OTHER SURGICAL  2013    molar removal (dental)          ALLERGIES     Allergies   Allergen Reactions    Sulfa (Sulfonamide Antibiotics) Anaphylaxis, Hives and Unknown (comments)    Sulfur Anaphylaxis, Hives and Seizures    Zyprexa [Olanzapine] Anaphylaxis    Allopurinol Other (comments)     anxiety    Celexa [Citalopram] Other (comments)     groggy    Clindamycin Nausea and Vomiting    Lisinopril Other (comments)     Denies allergy to this medication. Patient states he is currently taking.  Prozac [Fluoxetine] Anxiety    Risperidone Anxiety          FAMILY HISTORY     Family History   Problem Relation Age of Onset    Cancer Father     Heart Disease Father     Heart Disease Mother     Kidney Disease Mother     Diabetes Brother     negative for cardiac disease       SOCIAL HISTORY     Social History     Socioeconomic History    Marital status:    Tobacco Use    Smoking status: Current Every Day Smoker     Packs/day: 1.00     Years: 40.00     Pack years: 40.00    Smokeless tobacco: Never Used   Vaping Use    Vaping Use: Never used   Substance and Sexual Activity    Alcohol use: No     Alcohol/week: 0.0 standard drinks    Drug use: Yes     Types: Marijuana     Comment: DAILY TO SOCIALLY    Sexual activity: Never         MEDICATIONS     Current Outpatient Medications   Medication Sig    colchicine 0.6 mg tablet Take 0.6 mg by mouth daily.     tamsulosin (Flomax) 0.4 mg capsule Take 1 Capsule by mouth daily.  cholecalciferol (Vitamin D3) (1000 Units /25 mcg) tablet Take 2,000 Units by mouth daily (with lunch).  traMADoL (ULTRAM) 50 mg tablet Take 50 mg by mouth every six (6) hours as needed for Pain.  aspirin 81 mg chewable tablet Take 81 mg by mouth daily.  cyanocobalamin (VITAMIN B-12) 1,000 mcg sublingual tablet Take 5,000 mcg by mouth every Monday, Wednesday, Friday.  fluticasone (FLONASE ALLERGY RELIEF) 50 mcg/actuation nasal spray 2 Sprays daily as needed. LEFT NARE     No current facility-administered medications for this visit. I have reviewed the nurses notes, vitals, problem list, allergy list, medical history, family, social history and medications. REVIEW OF SYMPTOMS   Pertinent positives per HPI  General: Pt denies excessive weight gain or loss. Pt is able to conduct ADL's.deconditioned  HEENT: Denies blurred vision, headaches, hearing loss, epistaxis and difficulty swallowing. Respiratory: Denies cough, congestion, shortness of breath, LACY, wheezing or stridor. Cardiovascular: Denies precordial pain, palpitations, edema or PND  Gastrointestinal: Denies poor appetite, indigestion, abdominal pain or blood in stool  Genitourinary: Denies hematuria, dysuria, increased urinary frequency  Musculoskeletal: Denies joint pain or swelling from muscles or joints  Neurologic: Denies tremor, paresthesias, headache, or sensory motor disturbance  Psychiatric: Denies confusion, insomnia, depression  Integumentray: Denies rash, itching or ulcers. Hematologic: Denies easy bruising, bleeding     PHYSICAL EXAMINATION      Vitals:    02/07/22 1002   BP: (!) 210/100   Pulse: 68   Weight: 184 lb 9.6 oz (83.7 kg)   Height: 5' 10\" (1.778 m)     General: Well developed, in no acute distress.   HEENT: No jaundice, oral mucosa moist, no oral ulcers  Neck: Supple, no stiffness, no lymphadenopathy, supple  Heart:  Normal S1/S2 negative S3 or S4. Regular, no murmur, gallop or rub, no jugular venous distention  Respiratory: Clear bilaterally x 4, no wheezing or rales  Abdomen:   Soft, non-tender, bowel sounds are active.   Extremities:  No edema, normal cap refill, no cyanosis. Musculoskeletal: No clubbing, no deformities  Neuro: A&Ox3, speech clear, gait stable, cooperative, no focal neurologic deficits  Skin: Skin color is normal. No rashes or lesions. Non diaphoretic, moist.  Vascular: 2+ pulses symmetric in all extremities       DIAGNOSTIC DATA      1. Electrocardiogram    (1/29/16): normal sinus rhythm       LABORATORY DATA      Lab Results   Component Value Date/Time    WBC 9.6 04/01/2021 05:02 AM    Hemoglobin (POC) 14.2 04/24/2015 08:39 AM    HGB 9.6 (L) 04/29/2021 08:49 PM    HCT 34.1 (L) 04/29/2021 08:49 PM    PLATELET 549 (H) 63/74/0849 05:02 AM    MCV 67.6 (L) 04/01/2021 05:02 AM      Lab Results   Component Value Date/Time    Sodium 138 04/01/2021 05:02 AM    Potassium 4.4 04/01/2021 05:02 AM    Chloride 108 04/01/2021 05:02 AM    CO2 28 04/01/2021 05:02 AM    Anion gap 2 (L) 04/01/2021 05:02 AM    Glucose 101 (H) 04/01/2021 05:02 AM    BUN 20 04/01/2021 05:02 AM    Creatinine 1.01 04/01/2021 05:02 AM    BUN/Creatinine ratio 20 04/01/2021 05:02 AM    GFR est AA >60 04/01/2021 05:02 AM    GFR est non-AA >60 04/01/2021 05:02 AM    Calcium 8.7 04/01/2021 05:02 AM    Bilirubin, total 0.8 04/01/2021 05:02 AM    Alk. phosphatase 90 04/01/2021 05:02 AM    Protein, total 7.4 04/01/2021 05:02 AM    Albumin 4.1 04/01/2021 05:02 AM    Globulin 3.3 04/01/2021 05:02 AM    A-G Ratio 1.2 04/01/2021 05:02 AM    ALT (SGPT) 27 04/01/2021 05:02 AM           ASSESSMENT/PLAN        1. Hypertension  -BP is under good control on current meds  -I rechecked his blood pressure and it was 160/68  -I favor him going on lisinopril 0.25 mg every night    2. Tobacco abuse  -discussion on smoking cessation and complications from smoking    3.  SOB  -WILL CHECK ECHO  -He does not have any signs of heart failure on physical exam  -Shortness of breath may be related to hypertension    4. LAINE/early am fatigue  -NEEDS TO BE RECHECKED we will arrange for sleep evaluation    5. PALPITATIONS  - WILL PLACE A 48 HOUR HOLTER    6. Return in 6 weeks or PRN     FOLLOW-UP       Thank you,  Minal Astorga MD for entrusting me in the care of this male. Please do not hesitate to contact me for further cardiac questions/concerns.          Lorenza Hughes MD, [facility] at [address], [address]   [address]  [place], [address] [place] [address]

## 2022-02-07 NOTE — PROGRESS NOTES
Jose Ravi is a 61 y.o. male    Visit Vitals  Ht 5' 10\" (1.778 m)   BMI 26.26 kg/m²       Chief Complaint   Patient presents with    Palpitations     New Patient Evaluation    Slow Heart Rate       Chest pain NO  SOB NO  Dizziness NO  Swelling NO  Recent hospital visit NO  Refills NO  COVID VACCINE STATUS YES  HAD COVID?  NO

## 2022-02-07 NOTE — PROGRESS NOTES
Visit Vitals  BP (!) 210/100   Pulse 68   Ht 5' 10\" (1.778 m)   Wt 184 lb 9.6 oz (83.7 kg)   BMI 26.49 kg/m²

## 2022-02-07 NOTE — PROGRESS NOTES
Applied 48 hour monitor and gave instructions. Pt verbalized understanding of its use and will return 2/9/22.

## 2022-02-07 NOTE — PATIENT INSTRUCTIONS
1) I would like for you to have echocardiogram secondary to shortness of breath which is just a test to look at your heart function. This will be done at [address] [place] [address] and we can recheck your blood pressure at that time    2) I will place a 48-hour Holter monitor to look for your palpitations that you were experiencing although you said that these are improving    3) we will place you on lisinopril 2.5 mg at night for blood pressure. Intermittently monitor your blood pressure maybe once a day for the next on on the lisinopril. Also reduce your sodium intake    4) essentially stop smoking the progression of heart disease    5) follow-up with me in 6 to 8 weeks. 6) we will arrange for a repeat sleep evaluation. 7) you may consider getting calcium scoring performed. This is done and Vallarie Buerger in radiology department on the first floor. You will call and schedule the appointment as well before which is about $129. It is not mandatory but it may be helpful in for heart disease down the road.

## 2022-02-07 NOTE — LETTER
2/7/2022    Patient: Federico Marie   YOB: 1955   Date of Visit: 2/7/2022     Dayami Byrd MD  [address] [facility] [address]  [address], [place]  [address]  [address] [facility] [address]  [address] [facility] [address]    Dear MD Lamonte Schofield NP,      Thank you for referring Mr. Alfonso Barahona to [address] [place] [address] for evaluation. My notes for this consultation are attached. If you have questions, please do not hesitate to call me. I look forward to following your patient along with you.       Sincerely,    Tahira Lara MD

## 2022-02-09 ENCOUNTER — APPOINTMENT (OUTPATIENT)
Dept: CT IMAGING | Age: 67
DRG: 074 | End: 2022-02-09
Attending: EMERGENCY MEDICINE
Payer: MEDICARE

## 2022-02-09 ENCOUNTER — DOCUMENTATION ONLY (OUTPATIENT)
Dept: FAMILY MEDICINE CLINIC | Age: 67
End: 2022-02-09

## 2022-02-09 ENCOUNTER — TRANSCRIBE ORDER (OUTPATIENT)
Dept: SCHEDULING | Age: 67
End: 2022-02-09

## 2022-02-09 ENCOUNTER — HOSPITAL ENCOUNTER (INPATIENT)
Age: 67
LOS: 1 days | Discharge: LEFT BEFORE DISCHARGE | DRG: 074 | End: 2022-02-10
Attending: EMERGENCY MEDICINE | Admitting: FAMILY MEDICINE
Payer: MEDICARE

## 2022-02-09 ENCOUNTER — APPOINTMENT (OUTPATIENT)
Dept: GENERAL RADIOLOGY | Age: 67
DRG: 074 | End: 2022-02-09
Attending: EMERGENCY MEDICINE
Payer: MEDICARE

## 2022-02-09 DIAGNOSIS — G45.9 TIA (TRANSIENT ISCHEMIC ATTACK): Primary | ICD-10-CM

## 2022-02-09 DIAGNOSIS — Z13.6 SCREENING FOR ISCHEMIC HEART DISEASE: Primary | ICD-10-CM

## 2022-02-09 LAB
ANION GAP BLD CALC-SCNC: 11 MMOL/L (ref 10–20)
CA-I BLD-MCNC: 1.12 MMOL/L (ref 1.12–1.32)
CHLORIDE BLD-SCNC: 104 MMOL/L (ref 98–107)
CO2 BLD-SCNC: 28.2 MMOL/L (ref 21–32)
CREAT BLD-MCNC: 0.8 MG/DL (ref 0.6–1.3)
GLUCOSE BLD STRIP.AUTO-MCNC: 114 MG/DL (ref 65–117)
GLUCOSE BLD-MCNC: 108 MG/DL (ref 65–100)
INR PPP: 1.1 (ref 0.9–1.1)
POTASSIUM BLD-SCNC: 3.8 MMOL/L (ref 3.5–5.1)
PROTHROMBIN TIME: 11.1 SEC (ref 9–11.1)
SERVICE CMNT-IMP: ABNORMAL
SERVICE CMNT-IMP: NORMAL
SODIUM BLD-SCNC: 142 MMOL/L (ref 136–145)
TROPONIN-HIGH SENSITIVITY: 6 NG/L (ref 0–76)

## 2022-02-09 PROCEDURE — 71045 X-RAY EXAM CHEST 1 VIEW: CPT

## 2022-02-09 PROCEDURE — 36415 COLL VENOUS BLD VENIPUNCTURE: CPT

## 2022-02-09 PROCEDURE — 84484 ASSAY OF TROPONIN QUANT: CPT

## 2022-02-09 PROCEDURE — 80047 BASIC METABLC PNL IONIZED CA: CPT

## 2022-02-09 PROCEDURE — 83880 ASSAY OF NATRIURETIC PEPTIDE: CPT

## 2022-02-09 PROCEDURE — 82962 GLUCOSE BLOOD TEST: CPT

## 2022-02-09 PROCEDURE — 84100 ASSAY OF PHOSPHORUS: CPT

## 2022-02-09 PROCEDURE — 99285 EMERGENCY DEPT VISIT HI MDM: CPT

## 2022-02-09 PROCEDURE — 70496 CT ANGIOGRAPHY HEAD: CPT

## 2022-02-09 PROCEDURE — 85025 COMPLETE CBC W/AUTO DIFF WBC: CPT

## 2022-02-09 PROCEDURE — 80053 COMPREHEN METABOLIC PANEL: CPT

## 2022-02-09 PROCEDURE — 85610 PROTHROMBIN TIME: CPT

## 2022-02-09 PROCEDURE — 83036 HEMOGLOBIN GLYCOSYLATED A1C: CPT

## 2022-02-09 PROCEDURE — 74011000636 HC RX REV CODE- 636: Performed by: RADIOLOGY

## 2022-02-09 PROCEDURE — 93005 ELECTROCARDIOGRAM TRACING: CPT

## 2022-02-09 PROCEDURE — 70450 CT HEAD/BRAIN W/O DYE: CPT

## 2022-02-09 PROCEDURE — 84443 ASSAY THYROID STIM HORMONE: CPT

## 2022-02-09 PROCEDURE — 86140 C-REACTIVE PROTEIN: CPT

## 2022-02-09 PROCEDURE — 83735 ASSAY OF MAGNESIUM: CPT

## 2022-02-09 PROCEDURE — 82550 ASSAY OF CK (CPK): CPT

## 2022-02-09 RX ADMIN — IOPAMIDOL 100 ML: 755 INJECTION, SOLUTION INTRAVENOUS at 22:27

## 2022-02-09 NOTE — PROGRESS NOTES
Patient brought in copies of labs done at [facility] and event diary from heart monitor. Both placed in Ester's bin in front office.

## 2022-02-10 VITALS
BODY MASS INDEX: 26.34 KG/M2 | HEIGHT: 70 IN | TEMPERATURE: 98.7 F | RESPIRATION RATE: 17 BRPM | HEART RATE: 58 BPM | OXYGEN SATURATION: 99 % | SYSTOLIC BLOOD PRESSURE: 179 MMHG | WEIGHT: 184 LBS | DIASTOLIC BLOOD PRESSURE: 74 MMHG

## 2022-02-10 PROBLEM — R20.0 NUMBNESS: Status: ACTIVE | Noted: 2022-02-10

## 2022-02-10 LAB
25(OH)D3 SERPL-MCNC: 40.7 NG/ML (ref 30–100)
ALBUMIN SERPL-MCNC: 3.7 G/DL (ref 3.5–5)
ALBUMIN SERPL-MCNC: 3.7 G/DL (ref 3.5–5)
ALBUMIN/GLOB SERPL: 1.2 {RATIO} (ref 1.1–2.2)
ALBUMIN/GLOB SERPL: 1.2 {RATIO} (ref 1.1–2.2)
ALP SERPL-CCNC: 70 U/L (ref 45–117)
ALP SERPL-CCNC: 71 U/L (ref 45–117)
ALT SERPL-CCNC: 27 U/L (ref 12–78)
ALT SERPL-CCNC: 28 U/L (ref 12–78)
AMPHET UR QL SCN: NEGATIVE
ANION GAP SERPL CALC-SCNC: 4 MMOL/L (ref 5–15)
ANION GAP SERPL CALC-SCNC: 5 MMOL/L (ref 5–15)
AST SERPL-CCNC: 14 U/L (ref 15–37)
AST SERPL-CCNC: 7 U/L (ref 15–37)
ATRIAL RATE: 54 BPM
BARBITURATES UR QL SCN: NEGATIVE
BASOPHILS # BLD: 0.1 K/UL (ref 0–0.1)
BASOPHILS # BLD: 0.1 K/UL (ref 0–0.1)
BASOPHILS NFR BLD: 1 % (ref 0–1)
BASOPHILS NFR BLD: 1 % (ref 0–1)
BENZODIAZ UR QL: NEGATIVE
BILIRUB SERPL-MCNC: 0.4 MG/DL (ref 0.2–1)
BILIRUB SERPL-MCNC: 0.4 MG/DL (ref 0.2–1)
BNP SERPL-MCNC: 54 PG/ML
BUN SERPL-MCNC: 20 MG/DL (ref 6–20)
BUN SERPL-MCNC: 23 MG/DL (ref 6–20)
BUN/CREAT SERPL: 21 (ref 12–20)
BUN/CREAT SERPL: 24 (ref 12–20)
CALCIUM SERPL-MCNC: 8.4 MG/DL (ref 8.5–10.1)
CALCIUM SERPL-MCNC: 8.4 MG/DL (ref 8.5–10.1)
CALCULATED P AXIS, ECG09: 76 DEGREES
CALCULATED R AXIS, ECG10: 55 DEGREES
CALCULATED T AXIS, ECG11: 57 DEGREES
CANNABINOIDS UR QL SCN: POSITIVE
CHLORIDE SERPL-SCNC: 107 MMOL/L (ref 97–108)
CHLORIDE SERPL-SCNC: 108 MMOL/L (ref 97–108)
CHOLEST SERPL-MCNC: 91 MG/DL
CK SERPL-CCNC: 46 U/L (ref 39–308)
CO2 SERPL-SCNC: 27 MMOL/L (ref 21–32)
CO2 SERPL-SCNC: 28 MMOL/L (ref 21–32)
COCAINE UR QL SCN: NEGATIVE
CREAT SERPL-MCNC: 0.94 MG/DL (ref 0.7–1.3)
CREAT SERPL-MCNC: 0.96 MG/DL (ref 0.7–1.3)
CRP SERPL-MCNC: <0.29 MG/DL (ref 0–0.6)
DIAGNOSIS, 93000: NORMAL
DIFFERENTIAL METHOD BLD: ABNORMAL
DIFFERENTIAL METHOD BLD: ABNORMAL
DRUG SCRN COMMENT,DRGCM: ABNORMAL
EOSINOPHIL # BLD: 0.2 K/UL (ref 0–0.4)
EOSINOPHIL # BLD: 0.2 K/UL (ref 0–0.4)
EOSINOPHIL NFR BLD: 3 % (ref 0–7)
EOSINOPHIL NFR BLD: 3 % (ref 0–7)
ERYTHROCYTE [DISTWIDTH] IN BLOOD BY AUTOMATED COUNT: 20.3 % (ref 11.5–14.5)
ERYTHROCYTE [DISTWIDTH] IN BLOOD BY AUTOMATED COUNT: 20.5 % (ref 11.5–14.5)
EST. AVERAGE GLUCOSE BLD GHB EST-MCNC: 114 MG/DL
ETHANOL SERPL-MCNC: <10 MG/DL
FOLATE SERPL-MCNC: 14.8 NG/ML (ref 5–21)
GLOBULIN SER CALC-MCNC: 3 G/DL (ref 2–4)
GLOBULIN SER CALC-MCNC: 3.1 G/DL (ref 2–4)
GLUCOSE SERPL-MCNC: 105 MG/DL (ref 65–100)
GLUCOSE SERPL-MCNC: 118 MG/DL (ref 65–100)
HBA1C MFR BLD: 5.6 % (ref 4–5.6)
HCT VFR BLD AUTO: 39.5 % (ref 36.6–50.3)
HCT VFR BLD AUTO: 40.1 % (ref 36.6–50.3)
HDLC SERPL-MCNC: 38 MG/DL
HDLC SERPL: 2.4 {RATIO} (ref 0–5)
HGB BLD-MCNC: 11 G/DL (ref 12.1–17)
HGB BLD-MCNC: 11.2 G/DL (ref 12.1–17)
IMM GRANULOCYTES # BLD AUTO: 0 K/UL (ref 0–0.04)
IMM GRANULOCYTES # BLD AUTO: 0 K/UL (ref 0–0.04)
IMM GRANULOCYTES NFR BLD AUTO: 0 % (ref 0–0.5)
IMM GRANULOCYTES NFR BLD AUTO: 0 % (ref 0–0.5)
LDLC SERPL CALC-MCNC: 41.2 MG/DL (ref 0–100)
LYMPHOCYTES # BLD: 2.3 K/UL (ref 0.8–3.5)
LYMPHOCYTES # BLD: 2.6 K/UL (ref 0.8–3.5)
LYMPHOCYTES NFR BLD: 31 % (ref 12–49)
LYMPHOCYTES NFR BLD: 34 % (ref 12–49)
MAGNESIUM SERPL-MCNC: 2.4 MG/DL (ref 1.6–2.4)
MCH RBC QN AUTO: 18.2 PG (ref 26–34)
MCH RBC QN AUTO: 18.5 PG (ref 26–34)
MCHC RBC AUTO-ENTMCNC: 27.8 G/DL (ref 30–36.5)
MCHC RBC AUTO-ENTMCNC: 27.9 G/DL (ref 30–36.5)
MCV RBC AUTO: 65.5 FL (ref 80–99)
MCV RBC AUTO: 66.4 FL (ref 80–99)
METHADONE UR QL: NEGATIVE
MONOCYTES # BLD: 0.5 K/UL (ref 0–1)
MONOCYTES # BLD: 0.6 K/UL (ref 0–1)
MONOCYTES NFR BLD: 7 % (ref 5–13)
MONOCYTES NFR BLD: 8 % (ref 5–13)
NEUTS SEG # BLD: 4.3 K/UL (ref 1.8–8)
NEUTS SEG # BLD: 4.3 K/UL (ref 1.8–8)
NEUTS SEG NFR BLD: 55 % (ref 32–75)
NEUTS SEG NFR BLD: 57 % (ref 32–75)
NRBC # BLD: 0 K/UL (ref 0–0.01)
NRBC # BLD: 0 K/UL (ref 0–0.01)
NRBC BLD-RTO: 0 PER 100 WBC
NRBC BLD-RTO: 0 PER 100 WBC
OPIATES UR QL: NEGATIVE
P-R INTERVAL, ECG05: 140 MS
PCP UR QL: NEGATIVE
PHOSPHATE SERPL-MCNC: 3.7 MG/DL (ref 2.6–4.7)
PHOSPHATE SERPL-MCNC: 3.8 MG/DL (ref 2.6–4.7)
PLATELET # BLD AUTO: 622 K/UL (ref 150–400)
PLATELET # BLD AUTO: 632 K/UL (ref 150–400)
PMV BLD AUTO: 8.8 FL (ref 8.9–12.9)
PMV BLD AUTO: 8.8 FL (ref 8.9–12.9)
POTASSIUM SERPL-SCNC: 3.6 MMOL/L (ref 3.5–5.1)
POTASSIUM SERPL-SCNC: 3.8 MMOL/L (ref 3.5–5.1)
PROT SERPL-MCNC: 6.7 G/DL (ref 6.4–8.2)
PROT SERPL-MCNC: 6.8 G/DL (ref 6.4–8.2)
Q-T INTERVAL, ECG07: 446 MS
QRS DURATION, ECG06: 84 MS
QTC CALCULATION (BEZET), ECG08: 422 MS
RBC # BLD AUTO: 6.03 M/UL (ref 4.1–5.7)
RBC # BLD AUTO: 6.04 M/UL (ref 4.1–5.7)
RBC MORPH BLD: ABNORMAL
SODIUM SERPL-SCNC: 139 MMOL/L (ref 136–145)
SODIUM SERPL-SCNC: 140 MMOL/L (ref 136–145)
TRIGL SERPL-MCNC: 59 MG/DL (ref ?–150)
TROPONIN-HIGH SENSITIVITY: 8 NG/L (ref 0–76)
TSH SERPL DL<=0.05 MIU/L-ACNC: 1.72 UIU/ML (ref 0.36–3.74)
VENTRICULAR RATE, ECG03: 54 BPM
VIT B12 SERPL-MCNC: 1310 PG/ML (ref 193–986)
VLDLC SERPL CALC-MCNC: 11.8 MG/DL
WBC # BLD AUTO: 7.5 K/UL (ref 4.1–11.1)
WBC # BLD AUTO: 7.7 K/UL (ref 4.1–11.1)

## 2022-02-10 PROCEDURE — 84484 ASSAY OF TROPONIN QUANT: CPT

## 2022-02-10 PROCEDURE — 82746 ASSAY OF FOLIC ACID SERUM: CPT

## 2022-02-10 PROCEDURE — 80061 LIPID PANEL: CPT

## 2022-02-10 PROCEDURE — 80053 COMPREHEN METABOLIC PANEL: CPT

## 2022-02-10 PROCEDURE — 74011250637 HC RX REV CODE- 250/637: Performed by: STUDENT IN AN ORGANIZED HEALTH CARE EDUCATION/TRAINING PROGRAM

## 2022-02-10 PROCEDURE — 36415 COLL VENOUS BLD VENIPUNCTURE: CPT

## 2022-02-10 PROCEDURE — 84100 ASSAY OF PHOSPHORUS: CPT

## 2022-02-10 PROCEDURE — 82077 ASSAY SPEC XCP UR&BREATH IA: CPT

## 2022-02-10 PROCEDURE — 82306 VITAMIN D 25 HYDROXY: CPT

## 2022-02-10 PROCEDURE — 82607 VITAMIN B-12: CPT

## 2022-02-10 PROCEDURE — 65660000000 HC RM CCU STEPDOWN

## 2022-02-10 PROCEDURE — 80307 DRUG TEST PRSMV CHEM ANLYZR: CPT

## 2022-02-10 PROCEDURE — 85025 COMPLETE CBC W/AUTO DIFF WBC: CPT

## 2022-02-10 RX ORDER — SODIUM CHLORIDE 0.9 % (FLUSH) 0.9 %
5-40 SYRINGE (ML) INJECTION EVERY 8 HOURS
Status: DISCONTINUED | OUTPATIENT
Start: 2022-02-10 | End: 2022-02-10 | Stop reason: HOSPADM

## 2022-02-10 RX ORDER — SODIUM CHLORIDE 0.9 % (FLUSH) 0.9 %
5-40 SYRINGE (ML) INJECTION AS NEEDED
Status: DISCONTINUED | OUTPATIENT
Start: 2022-02-10 | End: 2022-02-10 | Stop reason: HOSPADM

## 2022-02-10 RX ORDER — TAMSULOSIN HYDROCHLORIDE 0.4 MG/1
0.4 CAPSULE ORAL DAILY
Status: DISCONTINUED | OUTPATIENT
Start: 2022-02-10 | End: 2022-02-10 | Stop reason: HOSPADM

## 2022-02-10 RX ORDER — GUAIFENESIN 100 MG/5ML
81 LIQUID (ML) ORAL DAILY
Status: DISCONTINUED | OUTPATIENT
Start: 2022-02-10 | End: 2022-02-10 | Stop reason: HOSPADM

## 2022-02-10 RX ORDER — MELATONIN
2000
Status: DISCONTINUED | OUTPATIENT
Start: 2022-02-10 | End: 2022-02-10 | Stop reason: HOSPADM

## 2022-02-10 RX ORDER — LABETALOL HCL 20 MG/4 ML
10 SYRINGE (ML) INTRAVENOUS
Status: DISCONTINUED | OUTPATIENT
Start: 2022-02-10 | End: 2022-02-10 | Stop reason: HOSPADM

## 2022-02-10 RX ORDER — ROSUVASTATIN CALCIUM 5 MG/1
5 TABLET, COATED ORAL DAILY
Qty: 90 TABLET | Refills: 3 | Status: SHIPPED | OUTPATIENT
Start: 2022-02-10 | End: 2022-03-01 | Stop reason: ALTCHOICE

## 2022-02-10 RX ORDER — COLCHICINE 0.6 MG/1
0.6 TABLET ORAL DAILY
Status: DISCONTINUED | OUTPATIENT
Start: 2022-02-10 | End: 2022-02-10 | Stop reason: HOSPADM

## 2022-02-10 RX ORDER — CLOPIDOGREL BISULFATE 75 MG/1
75 TABLET ORAL DAILY
Qty: 90 TABLET | Refills: 3 | Status: SHIPPED | OUTPATIENT
Start: 2022-02-10

## 2022-02-10 RX ADMIN — ASPIRIN 81 MG: 81 TABLET, CHEWABLE ORAL at 02:27

## 2022-02-10 NOTE — PROGRESS NOTES
TRANSFER - IN REPORT:    Verbal report received from ED RN (name) on Liborio Avalos  being received from ED (unit) for routine progression of care      Report consisted of patients Situation, Background, Assessment and   Recommendations(SBAR). Information from the following report(s) SBAR, Kardex, Intake/Output, MAR and Recent Results was reviewed with the receiving nurse. Opportunity for questions and clarification was provided. Assessment completed upon patients arrival to unit and care assumed. 6953: pt arrived to unit. Pt stating, \"I cannot chance being on a floor with covid patients. \" . Pt educated about importance of staying at the hospital for further testing and management. Pt still stating he would like to leave. 0310: MD at bedside to explain importance of staying at the hospital to pt. Pt stating he would like to leave. AMA papers signed by doctor and pt. IV removed and telebox disconnected from pt.     0315: pt off the flood AMA.

## 2022-02-10 NOTE — PROGRESS NOTES
[facility]   Senior Resident Admission Note    CC: left arm weakness/numbness    HPI:  Federico Marie is a 61 y.o. male w/ pertinent PMH of prior TIA (3/2021, same sx), carotid artery stenosis (s/p endarterectomy), HLD, HTN, B12 def who presents for left arm weakness and numbness of left arm and scalp. Symptoms lasted a couple hours and have since resolved. Chart reviewed. Patient seen, examined, and discussed with Dr. Carole Robertson (PGY-1). See H&P note for more details. ED Course:  Vitals: 98.7, 65, 203/71, 17, 99% RA  Labs: Notable  (BL), Hgb 11.2 (BL 12), Ca 8.4, trop 6  Imaging: CT head NAP. CTA head/neck without significant stenosis (BL ICA 0-25%). CXR NAP. EKG: sinus bradycardia at 54 bpm with PACs  Treatment: none    PE:  Physical Exam  Constitutional:       Appearance: Normal appearance. He is normal weight. HENT:      Head: Normocephalic and atraumatic. Eyes:      Extraocular Movements: Extraocular movements intact. Conjunctiva/sclera: Conjunctivae normal.      Pupils: Pupils are equal, round, and reactive to light. Neck:      Vascular: No carotid bruit. Cardiovascular:      Rate and Rhythm: Normal rate and regular rhythm. Pulses: Normal pulses. Heart sounds: Normal heart sounds. Pulmonary:      Effort: Pulmonary effort is normal.      Breath sounds: Normal breath sounds. Abdominal:      General: Abdomen is flat. Bowel sounds are normal.      Palpations: Abdomen is soft. Musculoskeletal:      Cervical back: Normal range of motion. No tenderness. Right lower leg: No edema. Left lower leg: No edema. Neurological:      General: No focal deficit present. Mental Status: He is alert and oriented to person, place, and time. Cranial Nerves: No cranial nerve deficit. Sensory: No sensory deficit. Motor: No weakness.       Coordination: Coordination normal. Finger-Nose-Finger Test normal.      Deep Tendon Reflexes: Reflexes normal.         A/P: 61 y.o. male admitted for CVA/TIA work up. Left sided arm numbness/weakness: Need to rule out CVA/TIA. Differential includes cervical DDD given disease in lumbar spine, B12 deficiency, and complex migraine given circumferential head numbness, among other. Admitted in 3/2021 for similar sx, no stroke at that time. CT head NAP. CTA head/neck without significant stenosis (BL ICA 0-25%). - Admit to telemetry   - Permissive HTN for the first 24-48 hours   - Continue aspirin 81mg daily  - Likely will need to restart Lipitor given h/o significant stenosis, f/u lipid panel and MRI  - Daily CBC, CMP, Mg, phos  - Check TSH, A1c, lipid, B12, folate, UDS, EtOH. Trend trop  - MRI brain, Echocardiogram  - PT/OT  - Neuro consulted  - Bedside swallow, resume diet if passed    Hypertensive Emergency w/ HTN: POA /71 improved without intervention. /110 at cardiology office on 2/7, improved on recheck (has white coat hypertension as well). Was started on lisinopril 2.5mg nightly. Previously on lisinopril 5-20mg taper dependent of weeks post monthly phlebotomy. He was taken off lisinopril after endarterectomy. Technically meets criteria for end organ damage with trop of 6.   - Hold lisinopril 2.5mg nightly to allow for permissive HTN in first 24-48hrs, plan to restart 2/11 PM  - Labetalol 10mg IV prn for SBP > 220 or DBP >110  - trend trop    I agree with remaining assessment and plan as documented in Dr. Lowery Batter note.     Pt discussed with Dr. Ez Oreilly (on-call attending physician)    Fausto Vivar DO  Family Medicine Resident

## 2022-02-10 NOTE — DISCHARGE SUMMARY
[address] [facility] [address] [place] [address]   Office [phone]  Fax [phone]     [facility] [facility]      3:25 AM  Pt expresses wish to leave the hospital against medical advice [facility]). Adverse problems related to this decision including bodily harm and death have been discussed with the patient and/or family (specifically the risk of worsening symptoms, stroke, and heart attack given elevated blood pressure as well as death). Patient also informed that insurance may not cover stay with AMA discharge. The patient and/or family do not appear intoxicated and appear to be capable of understanding and making a decision of such gravity. The patient and/or family express understanding of the possible adverse outcomes of their decision and still express the wish to leave against medical advice. The patient and/or family were given follow up and return instructions and the available laboratory tests and x-rays were discussed. The patient and/or family were given the opportunity to ask questions. The patient and/or family were advised to follow up with a physician of their choice as soon as possible or to return to the Hospital at any time to finish the work-up. Haylee Washington DO   Family Medicine Resident      AMA Note     Name: Germania Mcleod MRN: [id number]  Sex: Male   YOB: 1955  Age: 61 y.o. PCP: Jero Rosales MD     Date of admission: 2/9/2022  Date of discharge/transfer: 2/10/2022    Attending physician at admission: Myranda Matamoros MD     Attending physician at discharge/transfer: Dr. Elliott Eubanks    Resident physician at discharge/transfer: Haylee Washington DO     Consultants during hospitalization  IP CONSULT TO [address] TO [address] [facility]     Admission diagnoses   Numbness [R20.0]    Recommended follow-up after discharge  1.  PCP: Jero Rosales MD    Things to follow up on with PCP:  - neuropathy  - concern for recent TIA  - elevated blood pressure  - pending labs    History of Present Illness  Per admitting provider,     Andrés Galvin is a 61 y.o. male with a PMH of TIA, HTN, HLD, CAD, polycythemia vera, gout who presents to the ED complaining of L arm numbness and weakness. Pt reports that he experienced sudden onset of numbness and weakness affecting his entire L arm earlier this morning just prior to coming to this hospital. The numbness and weakness improved after 30 minutes to an hour. He reports residual numbness involving his L forearm, but notes that his other symptoms have improved. Pt also reports intermittent numbness in a band-like distribution around his head for the past several months. Pt also has been wearing a Holter monitor to evaluate palpitations that he has been experiencing  Patient denies vision changes, slurred speech, or ongoing weakness.     Of note, pt was recently hospitalized for CVA/TIA rule-out in March-April, 2021. At that time, he had tingling in his L fingers, L back numbness, L leg spasms and weakness. Work-up identified R internal carotid stenosis, which was subsequently treated surgically with endarterectomy by Dr. Jon Whittington. Pt reports that he has not seen neurology. He sees his hematologist regularly for treatment of polycythemia vera.     Pt reports that he has not been taking Plavix or a statin. He notes that he has been taking Vazalore 81 mg liquid aspirin capsules and Resveratrol instead of statin. He states that he is not taking his atorvastatin because he did not want to take a pill every day.     Pt vaccinated against COVID w/ Moderna x2. Ozzie Rayo is a 61 y.o. male with a PMH of notable for TIA, HTN, HLD, CAD, polycythemia vera, gout  who was admitted for left arm numbness and weakness. A CVA/TIA workup was initiated, however patient decided to leave AMA \"out of fear of catching Covid\".   Several efforts were made to reassure patient, particularly given vaccination status, however he was insistent on leaving. Expressed understanding of the risks outlined above. Left arm numbness/weakness: Appears to be part of generalized neuropathy affecting various parts of body including skull. Admitted for similar sx in 3/2021. CT head NAP. CTA head/neck without significant stenosis (BL ICA 0-25%). TSH 1.72.   - Continue aspirin 81mg daily  -  A1c, lipid, B12, folate levels pending  - Echocardiogram and MRI brain not completed  - May benefit from neurology consult outpatient     Hypertensive Emergency w/ HTN: POA /71  improved by discharge without intervention. Troponin stable 6 -> 8.  - Risk of MI and stroke explained to patient at [facility] discharge   - Continue lisinopril 2.5mg daily recently added by cardiology     H/o carotid artery stenosis: s/p R ICA endarterectomy. CTA head/neck w/o significant stenosis.      Hyperlipidemia: Lipid panel wnl in 8/2021 except HDL low (33). Was started on Lipitor 40mg qhs nightly after 3/2021 admission for significant right ICA stenosis however no longer takes  - lipid panel pending, consider restarting lipitor given h/o stenosis     DDD: evident of xray lumbar spine 2019  - Consider imaging of cervical spine if left arm symptoms reoccur     Polycythemia Vera: POA Hgb 11.2 (BL~12). Follows with H/O at [address] [place] [address] ([place] Stuart) for phlebotomy once a month   -Continue regular phlebotomy outpatient     B12 Deficiency: B12 1678 (high) in 3/2021.   -  B12 level pending  - Follow up with PCP op, consider discontinuing supplement pending level     Gout  - colchicine 0.6mg daily        Tobacco Dependence  - cessation encouraged     Marijuana Use: UDS + for [facility] 3/2021  - follow up PCP     Vit D Def: no recent levels  - Vit d level pending  - cont supplements daily      Anxiety, Depression, Bipolar Dep: no home meds.    -F/u OP     BPH  - Flomax 0.4mg daily     LAINE: No home CPAP since weight loss  - follow up PCP    Physical exam at discharge:    Vitals Reviewed. Visit Vitals  BP (!) 179/74   Pulse (!) 58   Temp 98.7 °F (37.1 °C)   Resp 17   Ht 5' 10\" (1.778 m)   Wt 184 lb (83.5 kg)   SpO2 99%   BMI 26.40 kg/m²        General Oriented to person, place, and time and well-developed. Appears well-nourished, no distress. Not diaphoretic. Neck No thyromegaly present. No cervical adenopathy. Cardio Normal rate, regular rhythm. Exam reveals no gallop and no friction rub. No murmur heard. Pulmonary Effort normal and breath sounds normal. No respiratory distress. No wheezes, no rales. Abdominal Soft. Bowel sounds normal. No distension. No tenderness. Extremities No edema of lower extremities. No tenderness. Distal pulses intact. Neurological Alert and oriented to person, place, and time. Dermatology Skin is warm and dry. No rash noted. No erythema or pallor. Psychiatric Affect and judgment normal.        Condition at discharge: Stable    Labs  Recent Labs     02/10/22  0200 02/09/22  2332   WBC 7.7 7.5   HGB 11.0* 11.2*   HCT 39.5 40.1   * 622*     Recent Labs     02/10/22  0200 02/09/22  2332    140   K 3.8 3.6    108   CO2 28 27   BUN 20 23*   CREA 0.96 0.94   * 105*   CA 8.4* 8.4*   MG  --  2.4   PHOS 3.7 3.8     Recent Labs     02/10/22  0200 02/09/22  2332   ALT 28 27   AP 71 70   TBILI 0.4 0.4   TP 6.7 6.8   ALB 3.7 3.7   GLOB 3.0 3.1     Recent Labs     02/09/22  2221 02/09/22  2215 02/09/22  2210   INR 1.1  --   --    PTP 11.1  --   --    GLUCPOC  --  108* 114       Microbiology  Results     ** No results found for the last 336 hours. **           Procedures / Diagnostic Studies  Echo Results  (Last 48 hours)    None           Imaging  CT HEAD WO CONT    Result Date: 2/9/2022  No acute process. XR CHEST PORT    Result Date: 2/9/2022  No acute cardiopulmonary disease. CTA HEAD NECK W CONT    Result Date: 2/9/2022  1. No acute process.  No large vessel occlusion, arterial dissection, or flow-limiting stenosis. 2. CT perfusion not performed. Chronic diagnoses   Problem List as of 2/10/2022 Date Reviewed: 2/7/2022          Codes Class Noted - Resolved    * (Principal) Numbness ICD-10-CM: R20.0  ICD-9-CM: 782.0  2/10/2022 - Present        Carotid artery disease ([facility] ICD-10-CM: I77.9  ICD-9-CM: 447.9  4/29/2021 - Present        Stenosis of carotid artery ICD-10-CM: I65.29  ICD-9-CM: 433.10  4/7/2021 - Present        CVA (cerebral vascular accident) [facility]) ICD-10-CM: I63.9  ICD-9-CM: 434.91  3/31/2021 - Present        Gout ICD-10-CM: M10.9  ICD-9-CM: 274.9  8/26/2019 - Present        Bipolar 1 disorder ([facility].) ICD-10-CM: F31.9  ICD-9-CM: 296.7  5/2/2018 - Present        Benign prostatic hyperplasia ICD-10-CM: N40.0  ICD-9-CM: 600.00  8/22/2017 - Present        Chronic nonintractable headache ICD-10-CM: R51.9, G89.29  ICD-9-CM: 784.0  8/22/2016 - Present        Anxiety and depression ICD-10-CM: F41.9, F32. A  ICD-9-CM: 300.00, 311  8/22/2016 - Present        Vertigo ICD-10-CM: R42  ICD-9-CM: 780.4  8/22/2016 - Present        Gallstones ICD-10-CM: K80.20  ICD-9-CM: 574.20  4/8/2014 - Present        Ringing in ears ICD-10-CM: H93.19  ICD-9-CM: 388.30  8/12/2013 - Present        Fatigue ICD-10-CM: R53.83  ICD-9-CM: 780.79  8/12/2013 - Present        Polycythemia vera (HCC) (Chronic) ICD-10-CM: D45  ICD-9-CM: 238.4  4/29/2013 - Present        Mixed hyperlipidemia ICD-10-CM: E78.2  ICD-9-CM: 272.2  6/12/2012 - Present        LAINE (obstructive sleep apnea) ICD-10-CM: G47.33  ICD-9-CM: 327.23  2/1/2012 - Present        Depression ICD-10-CM: F32. A  ICD-9-CM: 875  Unknown - Present        Essential hypertension, benign ICD-10-CM: I10  ICD-9-CM: 401.1  6/15/2011 - Present              Discharge/Transfer Medications  Discharge Medication List as of 2/10/2022  3:22 AM           Diet:  Regular diet.     Activity:  As tolerated    Disposition: left AMA    Discharge instructions to patient/family  Please seek medical attention for any new or worsening symptoms particularly fever, chest pain, shortness of breath, abdominal pain, nausea, vomiting    Follow up plans/appointments  Follow-up Information    None          Mariana Iglesias DO  Family Medicine Resident       For Billing    Chief Complaint   Patient presents with   [facility] Problems  Date Reviewed: 2/7/2022          Codes Class Noted POA    * (Principal) Numbness ICD-10-CM: R20.0  ICD-9-CM: 782.0  2/10/2022 Unknown        Carotid artery disease ([facility] ICD-10-CM: I77.9  ICD-9-CM: 447.9  4/29/2021 Yes        Stenosis of carotid artery ICD-10-CM: I65.29  ICD-9-CM: 433.10  4/7/2021 Yes        Gout ICD-10-CM: M10.9  ICD-9-CM: 274.9  8/26/2019 Yes        Bipolar 1 disorder ([facility] ICD-10-CM: F31.9  ICD-9-CM: 296.7  5/2/2018 Yes        Benign prostatic hyperplasia ICD-10-CM: N40.0  ICD-9-CM: 600.00  8/22/2017 Yes        Anxiety and depression ICD-10-CM: F41.9, F32. A  ICD-9-CM: 300.00, 311  8/22/2016 Yes        Vertigo ICD-10-CM: R42  ICD-9-CM: 780.4  8/22/2016 Yes        Polycythemia vera (HCC) (Chronic) ICD-10-CM: D45  ICD-9-CM: 238.4  4/29/2013 Yes        Mixed hyperlipidemia ICD-10-CM: E78.2  ICD-9-CM: 272.2  6/12/2012 Yes        LAINE (obstructive sleep apnea) ICD-10-CM: G47.33  ICD-9-CM: 327.23  2/1/2012 Yes        Essential hypertension, benign ICD-10-CM: I10  ICD-9-CM: 401.1  6/15/2011 Yes

## 2022-02-10 NOTE — H&P
[facility]   Admission H&P    Date of admission: 2/9/2022    Patient name: Corina Rutherford  MRN: [id number]  YOB: 1955  Age: 61 y.o. Primary care provider:  Shruthi Prieto MD     Source of Information: patient, medical records    Chief complaint:  L arm numbness and weakness    History of Present Illness  Corina Rutherford is a 61 y.o. male with a PMHx notable for TIA, HTN, HLD, CAD, polycythemia vera, gout who presents to the ED complaining of L arm numbness and weakness. Pt reports that he experienced sudden onset of numbness and weakness affecting his entire L arm earlier this morning just prior to coming to this hospital. The numbness and weakness improved after 30 minutes to an hour. He reports residual numbness involving his L forearm, but notes that his other symptoms have improved. Pt also reports intermittent numbness in a band-like distribution around his head for the past several months. Pt also has been wearing a Holter monitor to evaluate palpitations that he has been experiencing  Patient denies vision changes, slurred speech, or ongoing weakness. Of note, pt was recently hospitalized for CVA/TIA rule-out in March-April, 2021. At that time, he had tingling in his L fingers, L back numbness, L leg spasms and weakness. Work-up identified R carotid stenosis, which was subsequently treated surgically with endarterectomy by Dr. Sen Lozano. Pt reports that he has not seen neurology. He sees his hematologist regularly for treatment of polycythemia vera. Pt reports that he has not been taking Plavix or a statin. He notes that he has been taking [place] 81 mg liquid aspirin capsules and Resveratrol supplement instead of statin. Pt vaccinated against COVID w/ Moderna x2. In the ED:   Vitals: T: 98.7  P: 65  BP: 203/71  RR: 17  O2 sat: 99% on RA.   Labs: Glucose 114, WBC 7.5, Hgb 11.2, Platelets 992, MCV 38.6, Troponin 6, BNP 54, CK 46, CRP <0.29. Imaging:  - CXR: No acute cardiopulmonary disease.  - CT head wo contrast: No acute process. - CTA head neck: No acute process. No large vessel occlusion, arterial dissection, or flow-limiting stenosis. CT perfusion not performed. Treatment: None    Review of Systems  Constitutional: Negative for fevers or chills  Eyes: Negative for vision changes  ENT: Negative for sore throat or rhinorrhea  Cardiovascular: Negative for chest pain  Respiratory: Negative for SOB or cough  GI: Negative for abdominal pain, nausea, or vomiting  : Negative for dysuria or hematuria  MSK: Negative for arthralgias or myalgias  Neuro: Positive for numbness and weakness. Home Medications   Prior to Admission medications    Medication Sig Start Date End Date Taking? Authorizing Provider   colchicine 0.6 mg tablet Take 0.6 mg by mouth daily. Provider, Historical   tamsulosin (Flomax) 0.4 mg capsule Take 1 Capsule by mouth daily. 11/17/21   Frankie Best NP   cholecalciferol (Vitamin D3) (1000 Units /25 mcg) tablet Take 2,000 Units by mouth daily (with lunch). Provider, Historical   traMADoL (ULTRAM) 50 mg tablet Take 50 mg by mouth every six (6) hours as needed for Pain. Provider, Historical   aspirin 81 mg chewable tablet Take 81 mg by mouth daily. 3/30/21   Frankie Best NP   cyanocobalamin (VITAMIN B-12) 1,000 mcg sublingual tablet Take 5,000 mcg by mouth every Monday, Wednesday, Friday. Provider, Historical   fluticasone (FLONASE ALLERGY RELIEF) 50 mcg/actuation nasal spray 2 Sprays daily as needed.  LEFT NARE    Provider, Historical       Allergies   Allergies   Allergen Reactions    Sulfa (Sulfonamide Antibiotics) Anaphylaxis, Hives and Unknown (comments)    Sulfur Anaphylaxis, Hives and Seizures    Zyprexa [Olanzapine] Anaphylaxis    Allopurinol Other (comments)     anxiety    Celexa [Citalopram] Other (comments)     groggy    Clindamycin Nausea and Vomiting    Lisinopril Other (comments) Denies allergy to this medication. Patient states he is currently taking.  Prozac [Fluoxetine] Anxiety    Risperidone Anxiety       Past Medical History:   Diagnosis Date    Arthritis     GOUT    CAD (coronary artery disease)     CAROTID ENDARTERECTOMY 4/29/21    Coagulation disorder ([facility] [address]     PLATELET COUNTS ELEVATED    Depression     GERD (gastroesophageal reflux disease)     Hypertension     Liver disease 1984    Fatty liver    Other ill-defined conditions(799.89)     gallstones    Polycythemia vera(238.4) 4/29/2013    Prostatitis     PUD (peptic ulcer disease)     1980 POSSIBLE DUDENAL ULCER PER PT    Seizures ([facility] [address]     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    Sleep apnea 12/2011    doesn't use c-pap;  lost 25 lbs and has improved    Stroke [facility])     TIA  2021    Vertigo        Past Surgical History:   Procedure Laterality Date    HX CHOLECYSTECTOMY      HX COLONOSCOPY      HX ORTHOPAEDIC  1970    left wrist compound fracture    HX OTHER SURGICAL  2013    molar removal (dental)       Family History   Problem Relation Age of Onset    Cancer Father     Heart Disease Father     Heart Disease Mother     Kidney Disease Mother     Diabetes Brother        Social History   Patient resides    Independently      With family care      Assisted living      SNF      Ambulates    Independently      With cane       Assisted walker      Alcohol history     None     Social     Chronic     Smoking history    None     Former smoker     Current smoker     Social History     Tobacco Use   Smoking Status Current Every Day Smoker    Packs/day: 1.00    Years: 40.00    Pack years: 40.00   Smokeless Tobacco Never Used       Drug history    None     Former drug user     Current drug user     Code status    Full code     DNR/DNI     Partial    Code status discussed with the patient/caregivers.     Physical Exam  Visit Vitals  BP (!) 157/79   Pulse (!) 54   Temp 98.7 °F (37.1 °C)   Resp 16   Wt 192 lb 4.8 oz (87.2 kg)   SpO2 99%   BMI 27.59 kg/m²      Physical Examination:  General: No acute distress  Head: Normocephalic  Eyes: Conjunctiva pink  Neck: Supple, scar over R carotid region extending superior to inferior. ENT: No rhinorrhea  CV: Heart: bradycardic rate. Resp: Lungs: clear to auscultation bilaterally  GI: non-tender to palpation  Skin: Warm, dry  Neuro: Awake, alert, and oriented x3. CN II-XII intact. Motor strength 5/5 bilateral shoulder abduction, elbow flexion/extension,  strength. 5/5 bilateral knee flexion/extension, foot plantar/dorsiflexion. Sensation intact bilateral upper and lower extremities. No pronator drift. Romberg negative. Finger-to-nose normal. Rapid alternating movements normal.      Laboratory Data  Recent Results (from the past 24 hour(s))   GLUCOSE, POC    Collection Time: 02/09/22 10:10 PM   Result Value Ref Range    Glucose (POC) 114 65 - 117 mg/dL    Performed by Jose Fischer    POC CHEM8    Collection Time: 02/09/22 10:15 PM   Result Value Ref Range    Calcium, ionized (POC) 1.12 1.12 - 1.32 mmol/L    Sodium (POC) 142 136 - 145 mmol/L    Potassium (POC) 3.8 3.5 - 5.1 mmol/L    Chloride (POC) 104 98 - 107 mmol/L    CO2 (POC) 28.2 21 - 32 mmol/L    Anion gap (POC) 11 10 - 20 mmol/L    Glucose (POC) 108 (H) 65 - 100 mg/dL    Creatinine (POC) 0.80 0.6 - 1.3 mg/dL    GFRAA, POC >60 >60 ml/min/1.73m2    GFRNA, POC >60 >60 ml/min/1.73m2    Comment Comment Not Indicated.      PROTHROMBIN TIME + INR    Collection Time: 02/09/22 10:21 PM   Result Value Ref Range    INR 1.1 0.9 - 1.1      Prothrombin time 11.1 9.0 - 11.1 sec   TROPONIN-HIGH SENSITIVITY    Collection Time: 02/09/22 10:21 PM   Result Value Ref Range    Troponin-High Sensitivity 6 0 - 76 ng/L   EKG, 12 LEAD, INITIAL    Collection Time: 02/09/22 11:17 PM   Result Value Ref Range    Ventricular Rate 54 BPM    Atrial Rate 54 BPM    P-R Interval 140 ms    QRS Duration 84 ms    Q-T Interval 446 ms    QTC Calculation (Bezet) 422 ms    Calculated P Axis 76 degrees    Calculated R Axis 55 degrees    Calculated T Axis 57 degrees    Diagnosis       Sinus bradycardia with premature atrial complexes  Otherwise normal ECG  When compared with ECG of 29-APR-2021 14:44,  premature atrial complexes are now present     CBC WITH AUTOMATED DIFF    Collection Time: 02/09/22 11:32 PM   Result Value Ref Range    WBC 7.5 4.1 - 11.1 K/uL    RBC 6.04 (H) 4.10 - 5.70 M/uL    HGB 11.2 (L) 12.1 - 17.0 g/dL    HCT 40.1 36.6 - 50.3 %    MCV 66.4 (L) 80.0 - 99.0 FL    MCH 18.5 (L) 26.0 - 34.0 PG    MCHC 27.9 (L) 30.0 - 36.5 g/dL    RDW 20.3 (H) 11.5 - 14.5 %    PLATELET 265 (H) 973 - 400 K/uL    MPV 8.8 (L) 8.9 - 12.9 FL    NRBC 0.0 0  WBC    ABSOLUTE NRBC 0.00 0.00 - 0.01 K/uL    NEUTROPHILS 57 32 - 75 %    LYMPHOCYTES 31 12 - 49 %    MONOCYTES 8 5 - 13 %    EOSINOPHILS 3 0 - 7 %    BASOPHILS 1 0 - 1 %    IMMATURE GRANULOCYTES 0 0.0 - 0.5 %    ABS. NEUTROPHILS 4.3 1.8 - 8.0 K/UL    ABS. LYMPHOCYTES 2.3 0.8 - 3.5 K/UL    ABS. MONOCYTES 0.6 0.0 - 1.0 K/UL    ABS. EOSINOPHILS 0.2 0.0 - 0.4 K/UL    ABS. BASOPHILS 0.1 0.0 - 0.1 K/UL    ABS. IMM. GRANS. 0.0 0.00 - 0.04 K/UL    DF SMEAR SCANNED      RBC COMMENTS HYPOCHROMIA  4+        RBC COMMENTS ANISOCYTOSIS  2+       METABOLIC PANEL, COMPREHENSIVE    Collection Time: 02/09/22 11:32 PM   Result Value Ref Range    Sodium 140 136 - 145 mmol/L    Potassium 3.6 3.5 - 5.1 mmol/L    Chloride 108 97 - 108 mmol/L    CO2 27 21 - 32 mmol/L    Anion gap 5 5 - 15 mmol/L    Glucose 105 (H) 65 - 100 mg/dL    BUN 23 (H) 6 - 20 MG/DL    Creatinine 0.94 0.70 - 1.30 MG/DL    BUN/Creatinine ratio 24 (H) 12 - 20      GFR est AA >60 >60 ml/min/1.73m2    GFR est non-AA >60 >60 ml/min/1.73m2    Calcium 8.4 (L) 8.5 - 10.1 MG/DL    Bilirubin, total 0.4 0.2 - 1.0 MG/DL    ALT (SGPT) 27 12 - 78 U/L    AST (SGOT) 7 (L) 15 - 37 U/L    Alk.  phosphatase 70 45 - 117 U/L    Protein, total 6.8 6.4 - 8.2 g/dL    Albumin 3.7 3.5 - 5.0 g/dL Globulin 3.1 2.0 - 4.0 g/dL    A-G Ratio 1.2 1.1 - 2.2     NT-PRO BNP    Collection Time: 02/09/22 11:32 PM   Result Value Ref Range    NT pro-BNP 54 <125 PG/ML   PHOSPHORUS    Collection Time: 02/09/22 11:32 PM   Result Value Ref Range    Phosphorus 3.8 2.6 - 4.7 MG/DL   C REACTIVE PROTEIN, QT    Collection Time: 02/09/22 11:32 PM   Result Value Ref Range    C-Reactive protein <0.29 0.00 - 0.60 mg/dL   CK W/ REFLX CKMB    Collection Time: 02/09/22 11:32 PM   Result Value Ref Range    CK 46 39 - 308 U/L       Imaging  CT HEAD WO CONT    Result Date: 2/9/2022  No acute process. XR CHEST PORT    Result Date: 2/9/2022  No acute cardiopulmonary disease. CTA HEAD NECK W CONT    Result Date: 2/9/2022  1. No acute process. No large vessel occlusion, arterial dissection, or flow-limiting stenosis. 2. CT perfusion not performed. EKG:  Sinus bradycardia. HR 54. QTc 422. Assessment and Plan     Sven Livingston is a 61 y.o. male with a PMHx notable for TIA, HTN, HLD, CAD, polycythemia vera, gout who is admitted for CVA/TIA rule-out. Left arm numbness/weakness: Need to rule out CVA/TIA. DDx includes cervical DDD given disease in lumbar spine, B12 deficiency, and complex migraine given circumferential head numbness. Admitted in 3/2021 for similar symptoms, no stroke at that time. CT head NAP. CTA head/neck without significant stenosis (BL ICA 0-25%).    - Admit to telemetry w/ cardiac monitoring   - Neuro check q4h  - Permissive HTN for the first 24-48 hours SBP<220 and DBP<110    - Continue aspirin 81mg daily  - Likely will need to restart Lipitor given h/o significant stenosis, f/u lipid panel and MRI  - Daily CBC, CMP, Mg, phos  - Check TSH, A1c, lipid, B12, folate, UDS, EtOH  - Trend trop  - Echocardiogram  - MRI brain   - Consulted PT/OT  - Neuro consult, appreciate recs  - Dysphagia screening, if fails consult speech    Hypertensive Emergency w/ HTN: POA /71 initially improved to 149/91 without intervention. /110 at cardiology office on 2/7, improved on recheck (has white coat hypertension as well). Was started on lisinopril 2.5mg nightly. Previously on lisinopril 5-20mg taper dependent of weeks post monthly phlebotomy. He was taken off lisinopril after endarterectomy. Technically meets criteria for end organ damage with trop of 6.   - Monitor BP and allow for permissive HTN in first 24-48hrs  - Labetolol 10mg IV prn for SBP > 220 or DBP > 110    H/o carotid artery stenosis: s/p R ICA endarterectomy. CTA head/neck w/o significant stenosis. F/w Dr. Jon Whittington. Hyperlipidemia: Lipid panel wnl in 8/2021 except HDL low (33). Was started on Lipitor 40mg qhs nightly after 3/2021 admission for significant right ICA stenosis. - Recommend re-starting atorvastatin    DDD: evident of xray lumbar spine 2019  - Consider imaging of cervical spine    Polycythemia Vera: POA Hgb 11.2 (BL~12). Follows with H/O at [facility] ([facility]) for phlebotomy once a month.  -Continue regular phlebotomy outpatient    B12 Deficiency: B12 1678 (high) in 3/2021. On supplements  - Check B12 level as part of numbness w/u  - Continue supplement  - Follow up with PCP op, consider discontinuing supplement    Gout  - Colchicine 0.6mg daily       Tobacco Dependence: Pt smokes 1 pk per day. - Will administer nicotine patch     Marijuana Use: UDS + for [facility] 3/2021.  - UDS, EtOH     Vit D Def: no recent levels. - Vit d level  - cont supplements daily     Anxiety, Depression, Bipolar Dep: no home meds. -F/u OP     BPH  - Flomax 0.4mg daily     LAINE: No home CPAP since weight loss      FEN/GI: Regular diet after dysphagia screen  Activity: Ambulate with assistance. DVT prophylaxis: SCDs  GI prophylaxis:  None  Disposition: Plan to d/c to TBD. PT/OT consulted.   POC: Omid Moran (Spouse) [phone] (Mobile)    CODE STATUS:  Full       Patient will be discussed with Dr. Peggy Herring [facility] Attending)       Veronica Miller Kadie Renee MD  Family Medicine Resident, PGY-1      [facility] Problems  Date Reviewed: 2/7/2022          Codes Class Noted POA    Numbness ICD-10-CM: R20.0  ICD-9-CM: 782.0  2/10/2022 Unknown

## 2022-02-10 NOTE — ED PROVIDER NOTES
77-year-old male with a past medical history significant for gouty arthropathy, coronary artery disease, carotid endarterectomy, depression, GERD, liver disease, hypertension, polycythemia vera, prostatitis, peptic ulcer disease, seizure disorder, sleep apnea, TIA and vertigo who presents to the ER by EMS for evaluation for sudden onset of left arm weakness and numbness that lasted approximately 30 minutes to an hour and has improved since then. He stated he was resting comfortably when symptoms began suddenly. He then panicked and called 911 and was transported to the ER for further evaluation. The patient also complains of intermittent episodes of numbness in the scalp that has been ongoing for several months. He is also wearing a Holter monitor for the past 2 days for evaluation for arrhythmia. the patient states that he feels almost at the baseline. He denies any fever chills, neck or back pain, sore throat, cough or congestion, chest pain or shortness of breath with exertion, vomiting, diarrhea, constipation, dysuria, dizziness, sick contact, skin rash or recent travel.            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    Sleep apnea 12/2011    doesn't use c-pap;  lost 25 lbs and has improved    Stroke [facility])     TIA  2021    Vertigo        Past Surgical History:   Procedure Laterality Date    HX CHOLECYSTECTOMY      HX COLONOSCOPY      HX ORTHOPAEDIC  1970    left wrist compound fracture    HX OTHER SURGICAL  2013    molar removal (dental)         Family History:   Problem Relation Age of Onset    Cancer Father     Heart Disease Father     Heart Disease Mother     Kidney Disease Mother     Diabetes Brother        Social History     Socioeconomic History    Marital status:      Spouse name: Not on file    Number of children: Not on file    Years of education: Not on file    Highest education level: Not on file   Occupational History    Not on file   Tobacco Use    Smoking status: Current Every Day Smoker     Packs/day: 1.00     Years: 40.00     Pack years: 40.00    Smokeless tobacco: Never Used   Vaping Use    Vaping Use: Never used   Substance and Sexual Activity    Alcohol use: No     Alcohol/week: 0.0 standard drinks    Drug use: Yes     Types: Marijuana     Comment: DAILY TO SOCIALLY    Sexual activity: Never   Other Topics Concern    Not on file   Social History Narrative    Not on file     Social Determinants of Health     Financial Resource Strain:     Difficulty of Paying Living Expenses: Not on file   Food Insecurity:     Worried About Running Out of Food in the Last Year: Not on file    Ratna of Food in the Last Year: Not on file   Transportation Needs:     Lack of Transportation (Medical): Not on file    Lack of Transportation (Non-Medical):  Not on file   Physical Activity:     Days of Exercise per Week: Not on file    Minutes of Exercise per Session: Not on file   Stress:     Feeling of Stress : Not on file   Social Connections:     Frequency of Communication with Friends and Family: Not on file    Frequency of Social Gatherings with Friends and Family: Not on file    Attends Restoration Services: Not on file    Active Member of Clubs or Organizations: Not on file    Attends Club or Organization Meetings: Not on file    Marital Status: Not on file   Intimate Partner Violence:     Fear of Current or Ex-Partner: Not on file    Emotionally Abused: Not on file   [facility] Physically Abused: Not on file    Sexually Abused: Not on file   Housing Stability:     Unable to Pay for Housing in the Last Year: Not on file    Number of Places Lived in the Last Year: Not on file    Unstable Housing in the Last Year: Not on file         ALLERGIES: Sulfa (sulfonamide antibiotics), Sulfur, Zyprexa [olanzapine], Allopurinol, Celexa [citalopram], Clindamycin, Lisinopril, Prozac [fluoxetine], and Risperidone    Review of Systems   All other systems reviewed and are negative. Vitals:    02/09/22 2148   BP: (!) 203/71   Pulse: 65   Resp: 17   Temp: 98.7 °F (37.1 °C)   SpO2: 99%   Weight: 87.2 kg (192 lb 4.8 oz)            Physical Exam  Vitals and nursing note reviewed. Exam conducted with a chaperone present. CONSTITUTIONAL: Well-appearing; well-nourished; in no apparent distress  HEAD: Normocephalic; atraumatic  EYES: PERRL; EOM intact; conjunctiva and sclera are clear bilaterally. ENT: No rhinorrhea; normal pharynx with no tonsillar hypertrophy; mucous membranes pink/moist, no erythema, no exudate. NECK: Supple; non-tender; no cervical lymphadenopathy  CARD: Normal S1, S2; no murmurs, rubs, or gallops. Regular rate and rhythm. RESP: Normal respiratory effort; breath sounds clear and equal bilaterally; no wheezes, rhonchi, or rales. ABD: Normal bowel sounds; non-distended; non-tender; no palpable organomegaly, no masses, no bruits. Back Exam: Normal inspection; no vertebral point tenderness, no CVA tenderness. Normal range of motion. EXT: Normal ROM in all four extremities; non-tender to palpation; no swelling or deformity; distal pulses are normal, no edema. SKIN: Warm; dry; no rash.   NEURO:Alert and oriented x 3, coherent, JONATHAN-XII grossly intact, no pronator drift; reflexes intact; normal memory; normal gait; sensory and motor are non-focal.        MDM  Number of Diagnoses or Management Options  Diagnosis management comments: Assessment: 61-year-old male who presents with left arm weakness and numbness and intermittent numbness of the scalp that has subsided at this time. He remained hemodynamically stable. The patient is to multiple risk factors for cardiovascular disease and stroke. He has no focal finding on exam and his NIH stroke scale is 0. Symptoms appear consistent with possible TIA. He is not a candidate for lytic therapy at this time. Plan: CT scan of the head/CTA of the head and neck/lab/IV fluid/EKG/chest x-ray/education, reassurance, symptomatic treatment/consult hospitalist/serial exam/ Monitor and Reevaluate. Amount and/or Complexity of Data Reviewed  Clinical lab tests: ordered and reviewed  Tests in the radiology section of CPT®: ordered and reviewed  Tests in the medicine section of CPT®: reviewed and ordered  Discussion of test results with the performing providers: yes  Decide to obtain previous medical records or to obtain history from someone other than the patient: yes  Obtain history from someone other than the patient: yes  Review and summarize past medical records: yes  Discuss the patient with other providers: yes  Independent visualization of images, tracings, or specimens: yes    Risk of Complications, Morbidity, and/or Mortality  Presenting problems: moderate  Diagnostic procedures: moderate  Management options: moderate    Patient Progress  Patient progress: stable         Procedures    ED EKG interpretation:  Rhythm: sinus bradycardia; and regular . Rate (approx.): 54; Axis: normal; P wave: normal; QRS interval: normal ; ST/T wave: non-specific changes; in  Lead: Diffusely; PACs; Other findings: abnormal ekg. This EKG was interpreted by Jennifer Holley MD,ED Provider. XRAY INTERPRETATION (ED MD)  Chest Xray  No acute process seen. Normal heart size. No bony abnormalities. No infiltrate.   Sabra Cabezas MD 11:10 PM      PROGRESS NOTE:  Pt has been reexamined by Sabra Cabezas MD all available results have been reviewed with pt and any available family. Pt understands sx, dx, and tx in ED. Care plan has been outlined and questions have been answered. Pt and any available family understands and agrees to need for admission to hospital for further tx not available in ED. Pt is ready for admission. Written by Jose Jones MD,  11:10 PM    CONSULT NOTE:  Silver Neal MD spoke with the family practice residency team Discussed patient's presentation, history, physical assessment, and available diagnostic results. Will come and see the patient in the ED. Perfect Serve Consult for Admission  12:31 AM    ED Room Number: ER10/10  Patient Name and age:  Skylar Salmon 61 y.o.  male  Working Diagnosis:   1. TIA (transient ischemic attack)        COVID-19 Suspicion:  no  Sepsis present:  no  Reassessment needed: no  Code Status:  Full Code  Readmission: no  Isolation Requirements:  no  Recommended Level of Care:  telemetry  Department:  [facility] ED - [phone]  Admitting Provider: Family practice residency    Other: Total critical care time spent exclusive of procedures:  35 minutes. Migue Slade

## 2022-02-10 NOTE — ED NOTES
TRANSFER - OUT REPORT:    Verbal report given to [address] RN on [facility]  being transferred to [address] [place] [address] for routine progression of care       Report consisted of patients Situation, Background, Assessment and   Recommendations(SBAR). Information from the following report(s) SBAR, Kardex, ED Summary and MAR was reviewed with the receiving nurse. Lines:   Peripheral IV 02/09/22 Right Antecubital (Active)        Opportunity for questions and clarification was provided.       Patient transported with:   Monitor  Registered Nurse

## 2022-02-10 NOTE — ED TRIAGE NOTES
Pt. Yarely López in by EMS for intermittent numbness his scalp for years, states numbness to left arm over the past few days, states just finished wearing a holter monitor today for heart palpitations that occurred about 3 weeks ago.

## 2022-02-15 DIAGNOSIS — I10 ESSENTIAL HYPERTENSION, BENIGN: Primary | ICD-10-CM

## 2022-02-17 ENCOUNTER — TELEPHONE (OUTPATIENT)
Dept: CARDIOLOGY CLINIC | Age: 67
End: 2022-02-17

## 2022-02-17 NOTE — TELEPHONE ENCOUNTER
Patient said that he would like to know who will schedule him for his sleep apnea test since he canceled with [facility].    Patient would like his results from his heart monitor device. Patient would like to know if the doctor needs a copy of the inventory he was keeping of his heart monitor. He misplaced it and now has found it.    [phone]

## 2022-02-23 ENCOUNTER — ANCILLARY PROCEDURE (OUTPATIENT)
Dept: CARDIOLOGY CLINIC | Age: 67
End: 2022-02-23
Payer: COMMERCIAL

## 2022-02-23 ENCOUNTER — HOSPITAL ENCOUNTER (OUTPATIENT)
Dept: CT IMAGING | Age: 67
Discharge: HOME | End: 2022-02-23
Attending: SPECIALIST
Payer: SELF-PAY

## 2022-02-23 VITALS
HEIGHT: 70 IN | BODY MASS INDEX: 26.34 KG/M2 | SYSTOLIC BLOOD PRESSURE: 164 MMHG | WEIGHT: 184 LBS | DIASTOLIC BLOOD PRESSURE: 70 MMHG

## 2022-02-23 DIAGNOSIS — Z13.6 SCREENING FOR ISCHEMIC HEART DISEASE: ICD-10-CM

## 2022-02-23 DIAGNOSIS — I10 ESSENTIAL HYPERTENSION: ICD-10-CM

## 2022-02-23 DIAGNOSIS — R06.02 SOB (SHORTNESS OF BREATH): ICD-10-CM

## 2022-02-23 DIAGNOSIS — R00.2 PALPITATIONS: ICD-10-CM

## 2022-02-23 PROCEDURE — 75571 CT HRT W/O DYE W/CA TEST: CPT

## 2022-02-23 PROCEDURE — 93306 TTE W/DOPPLER COMPLETE: CPT | Performed by: SPECIALIST

## 2022-02-27 LAB
ECHO AO SINUS VALSALVA DIAM: 3.2 CM
ECHO AO SINUS VALSALVA INDEX: 1.59 CM/M2
ECHO AO ST JNCT DIAM: 3.1 CM
ECHO AV AREA PEAK VELOCITY: 3.2 CM2
ECHO AV AREA PEAK VELOCITY: 3.2 CM2
ECHO AV AREA VTI: 3.7 CM2
ECHO AV AREA/BSA VTI: 1.8 CM2/M2
ECHO AV MEAN GRADIENT: 4 MMHG
ECHO AV MEAN VELOCITY: 1 M/S
ECHO AV PEAK GRADIENT: 10 MMHG
ECHO AV PEAK VELOCITY: 1.6 M/S
ECHO AV VELOCITY RATIO: 0.88
ECHO AV VTI: 26.3 CM
ECHO EST RA PRESSURE: 3 MMHG
ECHO LA DIAMETER INDEX: 2.29 CM/M2
ECHO LA DIAMETER: 4.6 CM
ECHO LA VOL 2C: 94 ML (ref 18–58)
ECHO LA VOL 4C: 75 ML (ref 18–58)
ECHO LA VOLUME AREA LENGTH: 89 ML
ECHO LA VOLUME INDEX A2C: 47 ML/M2 (ref 16–34)
ECHO LA VOLUME INDEX A4C: 37 ML/M2 (ref 16–34)
ECHO LA VOLUME INDEX AREA LENGTH: 44 ML/M2 (ref 16–34)
ECHO LV E' LATERAL VELOCITY: 14 CM/S
ECHO LV E' SEPTAL VELOCITY: 9 CM/S
ECHO LV EDV A2C: 143 ML
ECHO LV EDV A4C: 130 ML
ECHO LV EDV BP: 136 ML (ref 67–155)
ECHO LV EDV INDEX A4C: 65 ML/M2
ECHO LV EDV INDEX BP: 68 ML/M2
ECHO LV EDV NDEX A2C: 71 ML/M2
ECHO LV EJECTION FRACTION A2C: 63 %
ECHO LV EJECTION FRACTION A4C: 64 %
ECHO LV EJECTION FRACTION BIPLANE: 63 % (ref 55–100)
ECHO LV ESV A2C: 53 ML
ECHO LV ESV A4C: 47 ML
ECHO LV ESV BP: 51 ML (ref 22–58)
ECHO LV ESV INDEX A2C: 26 ML/M2
ECHO LV ESV INDEX A4C: 23 ML/M2
ECHO LV ESV INDEX BP: 25 ML/M2
ECHO LV FRACTIONAL SHORTENING: 35 % (ref 28–44)
ECHO LV INTERNAL DIMENSION DIASTOLE INDEX: 2.54 CM/M2
ECHO LV INTERNAL DIMENSION DIASTOLIC: 5.1 CM (ref 4.2–5.9)
ECHO LV INTERNAL DIMENSION SYSTOLIC INDEX: 1.64 CM/M2
ECHO LV INTERNAL DIMENSION SYSTOLIC: 3.3 CM
ECHO LV IVSD: 1 CM (ref 0.6–1)
ECHO LV MASS 2D: 188 G (ref 88–224)
ECHO LV MASS INDEX 2D: 93.5 G/M2 (ref 49–115)
ECHO LV POSTERIOR WALL DIASTOLIC: 1 CM (ref 0.6–1)
ECHO LV RELATIVE WALL THICKNESS RATIO: 0.39
ECHO LVOT AREA: 3.8 CM2
ECHO LVOT AV VTI INDEX: 0.98
ECHO LVOT DIAM: 2.2 CM
ECHO LVOT MEAN GRADIENT: 3 MMHG
ECHO LVOT PEAK GRADIENT: 7 MMHG
ECHO LVOT PEAK VELOCITY: 1.4 M/S
ECHO LVOT STROKE VOLUME INDEX: 48.6 ML/M2
ECHO LVOT SV: 97.6 ML
ECHO LVOT VTI: 25.7 CM
ECHO MV A VELOCITY: 0.56 M/S
ECHO MV AREA PHT: 4.4 CM2
ECHO MV AREA VTI: 4.2 CM2
ECHO MV E DECELERATION TIME (DT): 173.8 MS
ECHO MV E VELOCITY: 0.89 M/S
ECHO MV E/A RATIO: 1.59
ECHO MV E/E' LATERAL: 6.36
ECHO MV E/E' RATIO (AVERAGED): 8.12
ECHO MV E/E' SEPTAL: 9.89
ECHO MV LVOT VTI INDEX: 0.89
ECHO MV MAX VELOCITY: 1 M/S
ECHO MV MEAN GRADIENT: 1 MMHG
ECHO MV MEAN VELOCITY: 0.4 M/S
ECHO MV PEAK GRADIENT: 4 MMHG
ECHO MV PRESSURE HALF TIME (PHT): 50.4 MS
ECHO MV VTI: 23 CM
ECHO RV FREE WALL PEAK S': 11 CM/S
ECHO RV INTERNAL DIMENSION: 4.3 CM
ECHO RV TAPSE: 2.8 CM (ref 1.5–2)

## 2022-03-01 ENCOUNTER — OFFICE VISIT (OUTPATIENT)
Dept: FAMILY MEDICINE CLINIC | Age: 67
End: 2022-03-01
Payer: COMMERCIAL

## 2022-03-01 VITALS
DIASTOLIC BLOOD PRESSURE: 60 MMHG | HEIGHT: 70 IN | BODY MASS INDEX: 26.77 KG/M2 | WEIGHT: 187 LBS | HEART RATE: 80 BPM | RESPIRATION RATE: 20 BRPM | OXYGEN SATURATION: 97 % | TEMPERATURE: 98.9 F | SYSTOLIC BLOOD PRESSURE: 144 MMHG

## 2022-03-01 DIAGNOSIS — I10 ESSENTIAL HYPERTENSION, BENIGN: ICD-10-CM

## 2022-03-01 DIAGNOSIS — I63.9 CEREBROVASCULAR ACCIDENT (CVA), UNSPECIFIED MECHANISM (HCC): ICD-10-CM

## 2022-03-01 DIAGNOSIS — D45 POLYCYTHEMIA VERA (HCC): Primary | ICD-10-CM

## 2022-03-01 PROCEDURE — 99213 OFFICE O/P EST LOW 20 MIN: CPT | Performed by: NURSE PRACTITIONER

## 2022-03-01 PROCEDURE — G8427 DOCREV CUR MEDS BY ELIG CLIN: HCPCS | Performed by: NURSE PRACTITIONER

## 2022-03-01 PROCEDURE — G8754 DIAS BP LESS 90: HCPCS | Performed by: NURSE PRACTITIONER

## 2022-03-01 PROCEDURE — G9717 DOC PT DX DEP/BP F/U NT REQ: HCPCS | Performed by: NURSE PRACTITIONER

## 2022-03-01 PROCEDURE — G8753 SYS BP > OR = 140: HCPCS | Performed by: NURSE PRACTITIONER

## 2022-03-01 PROCEDURE — 1111F DSCHRG MED/CURRENT MED MERGE: CPT | Performed by: NURSE PRACTITIONER

## 2022-03-01 PROCEDURE — 3017F COLORECTAL CA SCREEN DOC REV: CPT | Performed by: NURSE PRACTITIONER

## 2022-03-01 PROCEDURE — G8419 CALC BMI OUT NRM PARAM NOF/U: HCPCS | Performed by: NURSE PRACTITIONER

## 2022-03-01 RX ORDER — LISINOPRIL 2.5 MG/1
TABLET ORAL DAILY
COMMUNITY
End: 2022-08-29

## 2022-03-01 NOTE — PROGRESS NOTES
Chief Complaint   Patient presents with    Medication Evaluation     Pt being seen for med eval  -pt has concerns with elevated platelets  Pt hs concerns about getting a port  Pt has questions about if he should get his leg surgery     1. Have you been to the ER, urgent care clinic since your last visit? Hospitalized since your last visit? No    2. Have you seen or consulted any other health care providers outside of the [address] [place] [address] since your last visit? Include any pap smears or colon screening.  No     Pt has no other concerns

## 2022-03-02 NOTE — PROGRESS NOTES
HISTORY OF PRESENT ILLNESS  Purvi Pickett is a 61 y.o. male. HPI  Pt being seen for med eval  -pt has concerns with elevated platelets, he does have treatments to lower this regularly, followed by [facility]  Pt hs concerns about getting a port, tired of getting suck several times every time he gives labs because his veins have gotten so bad  Pt has questions about if he should get his leg surgery   Supposed to have vascular surgery on the left leg tomorrow to help with varicosities    ROS  A comprehensive review of system was obtained and negative except findings in the HPI    Visit Vitals  BP (!) 144/60 (BP 1 Location: Left upper arm, BP Patient Position: Sitting)   Pulse 80   Temp 98.9 °F (37.2 °C) (Oral)   Resp 20   Ht 5' 10\" (1.778 m)   Wt 187 lb (84.8 kg)   SpO2 97%   BMI 26.83 kg/m²     Physical Exam  Vitals and nursing note reviewed. Constitutional:       Appearance: Normal appearance. Cardiovascular:      Rate and Rhythm: Normal rate and regular rhythm. Heart sounds: Normal heart sounds. Pulmonary:      Breath sounds: Normal breath sounds. Neurological:      Mental Status: He is alert. ASSESSMENT and PLAN  Encounter Diagnoses   Name Primary?  Polycythemia vera ([address] Yes    Essential hypertension, benign     Cerebrovascular accident (CVA), unspecified mechanism ([address]      Orders Placed This Encounter    lisinopriL (PRINIVIL, ZESTRIL) 2.5 mg tablet     Refilled bp med  Encouraged him to reach out to his oncologist for discussion on port placement for more consistent access since this will be a life time process  Reviewed all CT imaging and gave him reassurance of normal findings    I have discussed the diagnosis with the patient and the intended plan as seen in the above orders. The patient has received an after-visit summary and questions were answered concerning future plans. Patient conveyed understanding of the plan at the time of the visit.     David Polanco, MSN, ANP 3/2/2022

## 2022-03-18 PROBLEM — I77.9 CAROTID ARTERY DISEASE (HCC): Status: ACTIVE | Noted: 2021-04-29

## 2022-03-18 PROBLEM — I63.9 CVA (CEREBRAL VASCULAR ACCIDENT) (HCC): Status: ACTIVE | Noted: 2021-03-31

## 2022-03-18 PROBLEM — N40.0 BENIGN PROSTATIC HYPERPLASIA: Status: ACTIVE | Noted: 2017-08-22

## 2022-03-19 PROBLEM — M10.9 GOUT: Status: ACTIVE | Noted: 2019-08-26

## 2022-03-19 PROBLEM — R20.0 NUMBNESS: Status: ACTIVE | Noted: 2022-02-10

## 2022-03-19 PROBLEM — F31.9 BIPOLAR 1 DISORDER (HCC): Status: ACTIVE | Noted: 2018-05-02

## 2022-03-19 PROBLEM — I65.29 STENOSIS OF CAROTID ARTERY: Status: ACTIVE | Noted: 2021-04-07

## 2022-03-22 NOTE — PATIENT INSTRUCTIONS
1) secondary elevated calcium score your chest discomfort we will go forward with exercise Cardiolite. If you are unable to work walk because of leg pain I will switch you to a [facility]. 2) again I favor that you work aggressively at reducing her tobacco intake    3) follow-up with me in 6 to 8 weeks. It is  my pleasure to have the opportunity to be involved in taking care of you and to provide you the best cardiac care. At the end of every visit I  encourage you to eat healthy and clean and reduce your red meat intake as well as exercise 30 minutes 5 days a week to ensure a healthy heart. If you are a smoker , it will be essential that you stop smoking to reduce your risk of heart disease. Part of providing you the best heart care possible IS AN ACCURATE KNOWLEDGE OF YOUR MEDICINE. It  will be  essential at each office visit that you provide me with an accurate list of your medicines. When you come into the office you should have that list or another option is lining up your pill bottles  and taking a snapshot with your cell phone of all the medicines you are taking currently and show it to the nurse in the examining room. Inaccurate reporting of your medication to the nurse may lead to adverse events and medical errors. Thank you again for giving me the opportunity to be part of your heart care and it is my pleasure. All the best,    Carlos Rubi MD

## 2022-03-22 NOTE — PROGRESS NOTES
CARDIOLOGY OFFICE NOTE    Carlos Infante MD, [address], [place], [address] [facility]  Phone [phone]; Fax [phone]  Mobile [phone]   Voice Mail [phone]      Cardiac risk factors: tobacco abuse, hypertension, male gender. I personally obtained the records from the patient. HISTORY OF PRESENTING ILLNESS      Federico Marie is a 61 y.o. male doing well. He has some leg discomfort and exposed Dr. Dajuan Marrufo do a diagnostic procedure because he believes there is a blockage. Carotid surgery in 2021 also has polycythemia vera which is resulted in increased platelet count. Did not get tested for LAINE because of his septal deviation. Does have occasional chest discomfort.       ECG is normal sinus rhythm   ACTIVE PROBLEM LIST     Patient Active Problem List    Diagnosis Date Noted    Numbness 02/10/2022    Carotid artery disease ([facility] 04/29/2021    Stenosis of carotid artery 04/07/2021    CVA (cerebral vascular accident) ([facility] 03/31/2021    Gout 08/26/2019    Bipolar 1 disorder ([address] 05/02/2018    Benign prostatic hyperplasia 08/22/2017    Chronic nonintractable headache 08/22/2016    Anxiety and depression 08/22/2016    Vertigo 08/22/2016    Gallstones 04/08/2014    Ringing in ears 08/12/2013    Fatigue 08/12/2013    Polycythemia vera ([address] 04/29/2013    Mixed hyperlipidemia 06/12/2012    LAINE (obstructive sleep apnea) 02/01/2012    Essential hypertension, benign 06/15/2011    Depression            PAST MEDICAL HISTORY     Past Medical History:   Diagnosis Date    Arthritis     GOUT    CAD (coronary artery disease)     CAROTID ENDARTERECTOMY 4/29/21    Coagulation disorder (HCC)     PLATELET COUNTS ELEVATED    Depression     GERD (gastroesophageal reflux disease)     Hypertension     Liver disease 1984    Fatty liver    Other ill-defined conditions(799.89)     gallstones    Polycythemia vera(238.4) 4/29/2013    Prostatitis     PUD (peptic ulcer disease)     1980 POSSIBLE DUDENAL ULCER PER PT    Seizures (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    Sleep apnea 12/2011    doesn't use c-pap;  lost 25 lbs and has improved    Stroke [facility])     TIA  2021    Vertigo            PAST SURGICAL HISTORY     Past Surgical History:   Procedure Laterality Date    HX CHOLECYSTECTOMY      HX COLONOSCOPY      HX ORTHOPAEDIC  1970    left wrist compound fracture    HX OTHER SURGICAL  2013    molar removal (dental)          ALLERGIES     Allergies   Allergen Reactions    Sulfa (Sulfonamide Antibiotics) Anaphylaxis, Hives and Unknown (comments)    Sulfur Anaphylaxis, Hives and Seizures    Zyprexa [Olanzapine] Anaphylaxis    Allopurinol Other (comments)     anxiety    Celexa [Citalopram] Other (comments)     groggy    Clindamycin Nausea and Vomiting    Lisinopril Other (comments)     Denies allergy to this medication. Patient states he is currently taking.  Prozac [Fluoxetine] Anxiety    Risperidone Anxiety          FAMILY HISTORY     Family History   Problem Relation Age of Onset    Cancer Father     Heart Disease Father     Heart Disease Mother     Kidney Disease Mother     Diabetes Brother     negative for cardiac disease       SOCIAL HISTORY     Social History     Socioeconomic History    Marital status:    Tobacco Use    Smoking status: Current Every Day Smoker     Packs/day: 1.00     Years: 40.00     Pack years: 40.00    Smokeless tobacco: Never Used   Vaping Use    Vaping Use: Never used   Substance and Sexual Activity    Alcohol use: No     Alcohol/week: 0.0 standard drinks    Drug use: Yes     Types: Marijuana     Comment: DAILY TO SOCIALLY    Sexual activity: Never         MEDICATIONS     Current Outpatient Medications   Medication Sig    lisinopriL (PRINIVIL, ZESTRIL) 2.5 mg tablet Take  by mouth daily.  clopidogreL (Plavix) 75 mg tab Take 1 Tablet by mouth daily.     colchicine 0.6 mg tablet Take 0.6 mg by mouth daily. PRN    tamsulosin (Flomax) 0.4 mg capsule Take 1 Capsule by mouth daily.  cholecalciferol (Vitamin D3) (1000 Units /25 mcg) tablet Take 2,000 Units by mouth daily (with lunch).  traMADoL (ULTRAM) 50 mg tablet Take 50 mg by mouth every six (6) hours as needed for Pain.  aspirin 81 mg chewable tablet Take 81 mg by mouth daily.  cyanocobalamin (VITAMIN B-12) 1,000 mcg sublingual tablet Take 5,000 mcg by mouth every Monday, Wednesday, Friday.  fluticasone (FLONASE ALLERGY RELIEF) 50 mcg/actuation nasal spray 2 Sprays daily as needed. LEFT NARE     No current facility-administered medications for this visit. I have reviewed the nurses notes, vitals, problem list, allergy list, medical history, family, social history and medications. REVIEW OF SYMPTOMS   Pertinent positives per HPI  General: Pt denies excessive weight gain or loss. Pt is able to conduct ADL's.deconditioned  HEENT: Denies blurred vision, headaches, hearing loss, epistaxis and difficulty swallowing. Respiratory: Denies cough, congestion, shortness of breath, LACY, wheezing or stridor. Cardiovascular: Denies precordial pain, palpitations, edema or PND  Gastrointestinal: Denies poor appetite, indigestion, abdominal pain or blood in stool  Genitourinary: Denies hematuria, dysuria, increased urinary frequency  Musculoskeletal: Denies joint pain or swelling from muscles or joints  Neurologic: Denies tremor, paresthesias, headache, or sensory motor disturbance  Psychiatric: Denies confusion, insomnia, depression  Integumentray: Denies rash, itching or ulcers. Hematologic: Denies easy bruising, bleeding     PHYSICAL EXAMINATION      Vitals:    03/23/22 1021   BP: 130/70   Pulse: 68   SpO2: 99%   Weight: 189 lb (85.7 kg)     General: Well developed, in no acute distress. HEENT: No jaundice, oral mucosa moist, no oral ulcers  Neck: Supple, no stiffness, no lymphadenopathy, supple  Heart:  Normal S1/S2 negative S3 or S4.  Regular, no murmur, gallop or rub, no jugular venous distention  Respiratory: Clear bilaterally x 4, no wheezing or rales  Extremities:  No edema, normal cap refill, no cyanosis. Musculoskeletal: No clubbing, no deformities  Neuro: A&Ox3, speech clear, gait stable, cooperative, no focal neurologic deficits  Skin: Skin color is normal. No rashes or lesions. Non diaphoretic, moist.       DIAGNOSTIC DATA      1. Electrocardiogram    (1/29/16): normal sinus rhythm    1. Echo  4/1/21-EF 55-60%, Agitated saline contrast study was performed. There was no shunting at baseline or with Valsalva. 2/23/22-EF 63%, LAE    2. Lipids  8/30/21- , HDL 33, LDL 62, TG 59  2/10/22- TC 91, HDL 38, LDL 41.2, TG 59    3. Calcium Score  2/23/22-The coronary calcium in each vessel is as follows:     Left main coronary artery: 0  Left anterior descending coronary artery: 34  Left circumflex coronary artery: 0  Right coronary artery: 0  Posterior descending coronary artery: 0     Total calcium score: 34         LABORATORY DATA      Lab Results   Component Value Date/Time    WBC 7.7 02/10/2022 02:00 AM    Hemoglobin (POC) 14.2 04/24/2015 08:39 AM    HGB 11.0 (L) 02/10/2022 02:00 AM    HCT 39.5 02/10/2022 02:00 AM    PLATELET 265 (H) 74/48/3473 02:00 AM    MCV 65.5 (L) 02/10/2022 02:00 AM      Lab Results   Component Value Date/Time    Sodium 139 02/10/2022 02:00 AM    Potassium 3.8 02/10/2022 02:00 AM    Chloride 107 02/10/2022 02:00 AM    CO2 28 02/10/2022 02:00 AM    Anion gap 4 (L) 02/10/2022 02:00 AM    Glucose 118 (H) 02/10/2022 02:00 AM    BUN 20 02/10/2022 02:00 AM    Creatinine 0.96 02/10/2022 02:00 AM    BUN/Creatinine ratio 21 (H) 02/10/2022 02:00 AM    GFR est AA >60 02/10/2022 02:00 AM    GFR est non-AA >60 02/10/2022 02:00 AM    Calcium 8.4 (L) 02/10/2022 02:00 AM    Bilirubin, total 0.4 02/10/2022 02:00 AM    Alk.  phosphatase 71 02/10/2022 02:00 AM    Protein, total 6.7 02/10/2022 02:00 AM    Albumin 3.7 02/10/2022 02:00 AM Globulin 3.0 02/10/2022 02:00 AM    A-G Ratio 1.2 02/10/2022 02:00 AM    ALT (SGPT) 28 02/10/2022 02:00 AM           ASSESSMENT/PLAN        1. Hypertension  -BP is much better on his current dose of lisinopril    2. Tobacco abuse  -Continues to smoke and explained to him that his vascular disease will progress as well his risk for heart attack and stroke    3. SOB  -EF is normal  -Probably related to his smoking. 4. LAINE/early am fatigue  -has not had evaluation secondary to his deviated septum. 5. PALPITATIONS  -His 48-hour Holter monitor demonstrated no significant events that were concerning. He had an episode of SVT actually x7 the longest event being 8 beats and the fastest being 124 beats a minute    6. PVDz  -leg pain followed by Dr. Jon Evans   -Dr. Lesley Arriola wants to do arterial diagnostic angiogram and patient put off.  -I would go forward the angiogram once his stress test is finished    7. Polycythemia vera  -Recently there has been increase in his platelet count    8. Chest discomfort  -We will go forward with  exercise Cardiolite. 7. Return in 6-8 weeks or PRN     FOLLOW-UP       Thank you,  Lisseth Torres MD for entrusting me in the care of this male. Please do not hesitate to contact me for further cardiac questions/concerns.          Yogi Alston MD, [facility] at [address], [address]   [address], [address]  [place] [address] [place] [address]

## 2022-03-23 ENCOUNTER — OFFICE VISIT (OUTPATIENT)
Dept: CARDIOLOGY CLINIC | Age: 67
End: 2022-03-23
Payer: COMMERCIAL

## 2022-03-23 VITALS
OXYGEN SATURATION: 99 % | BODY MASS INDEX: 27.12 KG/M2 | SYSTOLIC BLOOD PRESSURE: 130 MMHG | HEART RATE: 68 BPM | WEIGHT: 189 LBS | DIASTOLIC BLOOD PRESSURE: 70 MMHG

## 2022-03-23 DIAGNOSIS — R06.02 SOB (SHORTNESS OF BREATH): ICD-10-CM

## 2022-03-23 DIAGNOSIS — I10 ESSENTIAL HYPERTENSION: Primary | ICD-10-CM

## 2022-03-23 DIAGNOSIS — R00.2 PALPITATIONS: ICD-10-CM

## 2022-03-23 PROCEDURE — 3017F COLORECTAL CA SCREEN DOC REV: CPT | Performed by: SPECIALIST

## 2022-03-23 PROCEDURE — G8754 DIAS BP LESS 90: HCPCS | Performed by: SPECIALIST

## 2022-03-23 PROCEDURE — G8419 CALC BMI OUT NRM PARAM NOF/U: HCPCS | Performed by: SPECIALIST

## 2022-03-23 PROCEDURE — 99214 OFFICE O/P EST MOD 30 MIN: CPT | Performed by: SPECIALIST

## 2022-03-23 PROCEDURE — G8752 SYS BP LESS 140: HCPCS | Performed by: SPECIALIST

## 2022-03-23 PROCEDURE — G9717 DOC PT DX DEP/BP F/U NT REQ: HCPCS | Performed by: SPECIALIST

## 2022-03-23 PROCEDURE — G8427 DOCREV CUR MEDS BY ELIG CLIN: HCPCS | Performed by: SPECIALIST

## 2022-03-23 NOTE — LETTER
3/23/2022    Patient: Kelvin Paredes   YOB: 1955   Date of Visit: 3/23/2022     Raheel Espinoza, [address] [facility] [address]  [address] [facility] [address]    Dear Raheel Espinoza MD,      Thank you for referring Mr. Aleks Wong to [address]  for evaluation. My notes for this consultation are attached. If you have questions, please do not hesitate to call me. I look forward to following your patient along with you.       Sincerely,    Armand Rankin MD

## 2022-03-23 NOTE — PROGRESS NOTES
Chief Complaint   Patient presents with    Follow-up     hospital 2/22    Shortness of Breath    Hypertension    Palpitations     Visit Vitals  /70 (BP 1 Location: Left upper arm, BP Patient Position: Sitting)   Pulse 68   Wt 189 lb (85.7 kg)   SpO2 99%   BMI 27.12 kg/m²     Chest pain denied   SOB denied   Palpitations denied   Swelling in hands/feet yes gout related.   Dizziness denied   Recent hospital stays 2/22   Refills denied

## 2022-03-28 RX ORDER — COLCHICINE 0.6 MG/1
CAPSULE ORAL
Qty: 60 CAPSULE | Refills: 1 | Status: SHIPPED | OUTPATIENT
Start: 2022-03-28

## 2022-05-17 NOTE — TELEPHONE ENCOUNTER
.attempted to call pt, no answer left vm to call office back
His echo was completely normal with no cardiac abnormalities seen.  Jona Puls
Informed pt of results of echo.  Pt verbalized understanding
Pt wants you to look at his Echo results
Received call from patient. Stacey Carrasquillo)    Patient states that Allopurinol is causing a rash on his ankle and elbows and he has determined that he is going to DC this RX. Patient requesting Colchicine as a PRN to treat potential gout flare ups. Lab for Uric Acid level also requested.
Spoke with pt he states that he had an echo done while he was at [facility] in patient. He is wanting to know if provider could look at this and give him the results. He reports that he was not told the results of this.
9.69

## 2022-06-27 RX ORDER — VALACYCLOVIR HYDROCHLORIDE 500 MG/1
TABLET, FILM COATED ORAL
Qty: 20 TABLET | Refills: 5 | Status: SHIPPED | OUTPATIENT
Start: 2022-06-27

## 2022-07-29 ENCOUNTER — NURSE TRIAGE (OUTPATIENT)
Dept: OTHER | Facility: CLINIC | Age: 67
End: 2022-07-29

## 2022-07-29 NOTE — TELEPHONE ENCOUNTER
Received call from Vee Meza at [facility] with Red Flag Complaint. Subjective: Caller states \"fatigue, numbness in top of head also\"     Current Symptoms: fatigue numbness in top of head vision issues states vision is hazy at times also has dizzy spells  comes and goes and feels like might pass out hx cad, htn, cva, has prostate pain hx also poylcythemia    Onset: 1 week ago; gradual    Associated Symptoms: NA    Pain Severity: 2/10; aching; mild but can go up to 7/10 moves to different area of body    Temperature: none     What has been tried:     LMP: NA Pregnant: NA    Recommended disposition: See in Office Today    Care advice provided, patient verbalizes understanding; denies any other questions or concerns; instructed to call back for any new or worsening symptoms. Patient/Caller agrees with recommended disposition; writer provided warm transfer to moses at [facility] for appointment scheduling    Attention Provider: Thank you for allowing me to participate in the care of your patient. The patient was connected to triage in response to information provided to the [facility].   Please do not respond through this encounter as the response is not directed to a   Reason for Disposition   MODERATE dizziness (e.g., interferes with normal activities) (Exception: dizziness caused by heat exposure, sudden standing, or poor fluid intake)    Protocols used: Dizziness-ADULT-[place]

## 2022-08-29 ENCOUNTER — OFFICE VISIT (OUTPATIENT)
Dept: FAMILY MEDICINE CLINIC | Age: 67
End: 2022-08-29
Payer: COMMERCIAL

## 2022-08-29 VITALS
BODY MASS INDEX: 25.91 KG/M2 | DIASTOLIC BLOOD PRESSURE: 75 MMHG | OXYGEN SATURATION: 98 % | SYSTOLIC BLOOD PRESSURE: 149 MMHG | WEIGHT: 181 LBS | TEMPERATURE: 98.4 F | HEART RATE: 73 BPM | RESPIRATION RATE: 14 BRPM | HEIGHT: 70 IN

## 2022-08-29 DIAGNOSIS — D45 POLYCYTHEMIA VERA (HCC): Primary | ICD-10-CM

## 2022-08-29 PROCEDURE — G9717 DOC PT DX DEP/BP F/U NT REQ: HCPCS | Performed by: NURSE PRACTITIONER

## 2022-08-29 PROCEDURE — 1101F PT FALLS ASSESS-DOCD LE1/YR: CPT | Performed by: NURSE PRACTITIONER

## 2022-08-29 PROCEDURE — G8417 CALC BMI ABV UP PARAM F/U: HCPCS | Performed by: NURSE PRACTITIONER

## 2022-08-29 PROCEDURE — 3017F COLORECTAL CA SCREEN DOC REV: CPT | Performed by: NURSE PRACTITIONER

## 2022-08-29 PROCEDURE — G8753 SYS BP > OR = 140: HCPCS | Performed by: NURSE PRACTITIONER

## 2022-08-29 PROCEDURE — 99213 OFFICE O/P EST LOW 20 MIN: CPT | Performed by: NURSE PRACTITIONER

## 2022-08-29 PROCEDURE — G8427 DOCREV CUR MEDS BY ELIG CLIN: HCPCS | Performed by: NURSE PRACTITIONER

## 2022-08-29 PROCEDURE — G8754 DIAS BP LESS 90: HCPCS | Performed by: NURSE PRACTITIONER

## 2022-08-29 PROCEDURE — G8536 NO DOC ELDER MAL SCRN: HCPCS | Performed by: NURSE PRACTITIONER

## 2022-08-29 PROCEDURE — 1123F ACP DISCUSS/DSCN MKR DOCD: CPT | Performed by: NURSE PRACTITIONER

## 2022-08-29 RX ORDER — LISINOPRIL 5 MG/1
20 TABLET ORAL DAILY
Qty: 360 TABLET | Refills: 3 | Status: SHIPPED | OUTPATIENT
Start: 2022-08-29

## 2022-08-29 NOTE — PROGRESS NOTES
Chief Complaint   Patient presents with    Dizziness     Pt being seen for dizziness  -pt states he has prostate issues and was taking sudafed which he thinks is a contributing factor  -pt states this could be sinus related  -pt also states he has numbness in his head and states he cant hold his eyes open    1. Have you been to the ER, urgent care clinic since your last visit? Hospitalized since your last visit? No    2. Have you seen or consulted any other health care providers outside of the [address] [place] [address] since your last visit? Include any pap smears or colon screening.  No    Pt has no other concerns

## 2022-08-30 NOTE — PROGRESS NOTES
HISTORY OF PRESENT ILLNESS  Joel Florence is a 74 y.o. male. HPI  Pt being seen for dizziness  -pt states he has prostate issues and was taking sudafed which he thinks is a contributing factor  -pt states this could be sinus related  -pt also states he has numbness in his head and states he cant hold his eyes open  He has an appt with ENT coming up for eval    Bigger concern is his high platelet count  He would like to do all of his blood labs in one place, has to have it done monthly  Would like second opinion regarding a treatment plan    ROS  A comprehensive review of system was obtained and negative except findings in the HPI    Visit Vitals  BP (!) 149/75 (BP 1 Location: Left upper arm, BP Patient Position: Sitting)   Pulse 73   Temp 98.4 °F (36.9 °C) (Oral)   Resp 14   Ht 5' 10\" (1.778 m)   Wt 181 lb (82.1 kg)   SpO2 98%   BMI 25.97 kg/m²     Physical Exam  Vitals and nursing note reviewed. Constitutional:       Appearance: Normal appearance. Cardiovascular:      Rate and Rhythm: Normal rate and regular rhythm. Heart sounds: Normal heart sounds. Pulmonary:      Breath sounds: Normal breath sounds. Musculoskeletal:         General: No swelling. Neurological:      Mental Status: He is alert. ASSESSMENT and PLAN  Encounter Diagnoses   Name Primary? Polycythemia vera ([address] Yes     Orders Placed This Encounter    Arely Hem Onc [facility]    lisinopriL (PRINIVIL, ZESTRIL) 5 mg tablet     Given Dr. Tee Mccurdy for second opinion regarding his platelets  Refills given for his lisinopril, refuses to take 10mg daily, will take only the amound he feels he needs daily to bring it into goal,    I have discussed the diagnosis with the patient and the intended plan as seen in the above orders. The patient has received an after-visit summary and questions were answered concerning future plans. Patient conveyed understanding of the plan at the time of the visit.     Sven West, MSN, ANP 8/29/2022

## 2022-09-26 RX ORDER — CEPHALEXIN 500 MG/1
CAPSULE ORAL
Qty: 30 CAPSULE | Refills: 0 | Status: SHIPPED | OUTPATIENT
Start: 2022-09-26 | End: 2022-10-19 | Stop reason: ALTCHOICE

## 2022-10-10 RX ORDER — TAMSULOSIN HYDROCHLORIDE 0.4 MG/1
CAPSULE ORAL
Qty: 90 CAPSULE | Refills: 3 | Status: SHIPPED | OUTPATIENT
Start: 2022-10-10

## 2022-10-16 ENCOUNTER — APPOINTMENT (OUTPATIENT)
Dept: CT IMAGING | Age: 67
End: 2022-10-16
Attending: EMERGENCY MEDICINE
Payer: COMMERCIAL

## 2022-10-16 ENCOUNTER — HOSPITAL ENCOUNTER (EMERGENCY)
Age: 67
Discharge: HOME | End: 2022-10-16
Attending: EMERGENCY MEDICINE
Payer: COMMERCIAL

## 2022-10-16 ENCOUNTER — APPOINTMENT (OUTPATIENT)
Dept: GENERAL RADIOLOGY | Age: 67
End: 2022-10-16
Attending: EMERGENCY MEDICINE
Payer: COMMERCIAL

## 2022-10-16 VITALS
DIASTOLIC BLOOD PRESSURE: 63 MMHG | HEART RATE: 58 BPM | OXYGEN SATURATION: 98 % | RESPIRATION RATE: 16 BRPM | WEIGHT: 185 LBS | BODY MASS INDEX: 26.48 KG/M2 | HEIGHT: 70 IN | SYSTOLIC BLOOD PRESSURE: 153 MMHG | TEMPERATURE: 97.8 F

## 2022-10-16 DIAGNOSIS — S20.212A CHEST WALL CONTUSION, LEFT, INITIAL ENCOUNTER: ICD-10-CM

## 2022-10-16 DIAGNOSIS — S50.311A ABRASION OF RIGHT ELBOW, INITIAL ENCOUNTER: ICD-10-CM

## 2022-10-16 DIAGNOSIS — S60.419A ABRASION OF FINGER OF RIGHT HAND, INITIAL ENCOUNTER: ICD-10-CM

## 2022-10-16 DIAGNOSIS — S22.42XA CLOSED FRACTURE OF MULTIPLE RIBS OF LEFT SIDE, INITIAL ENCOUNTER: Primary | ICD-10-CM

## 2022-10-16 DIAGNOSIS — S60.141A CONTUSION OF RIGHT RING FINGER WITH DAMAGE TO NAIL, INITIAL ENCOUNTER: ICD-10-CM

## 2022-10-16 PROCEDURE — 71250 CT THORAX DX C-: CPT

## 2022-10-16 PROCEDURE — 99284 EMERGENCY DEPT VISIT MOD MDM: CPT

## 2022-10-16 PROCEDURE — 73140 X-RAY EXAM OF FINGER(S): CPT

## 2022-10-16 PROCEDURE — 74011250637 HC RX REV CODE- 250/637: Performed by: EMERGENCY MEDICINE

## 2022-10-16 RX ORDER — LIDOCAINE 4 G/100G
PATCH TOPICAL
Qty: 20 EACH | Refills: 0 | Status: SHIPPED | OUTPATIENT
Start: 2022-10-16 | End: 2022-10-16 | Stop reason: SDUPTHER

## 2022-10-16 RX ORDER — LIDOCAINE 4 G/100G
PATCH TOPICAL
Qty: 20 EACH | Refills: 0 | Status: SHIPPED | OUTPATIENT
Start: 2022-10-16

## 2022-10-16 RX ORDER — IBUPROFEN 800 MG/1
800 TABLET ORAL
Qty: 30 TABLET | Refills: 0 | Status: SHIPPED | OUTPATIENT
Start: 2022-10-16 | End: 2022-10-24

## 2022-10-16 RX ORDER — HYDROCODONE BITARTRATE AND ACETAMINOPHEN 5; 325 MG/1; MG/1
1 TABLET ORAL
Qty: 20 TABLET | Refills: 0 | Status: SHIPPED | OUTPATIENT
Start: 2022-10-16 | End: 2022-10-19

## 2022-10-16 RX ORDER — HYDROCODONE BITARTRATE AND ACETAMINOPHEN 5; 325 MG/1; MG/1
1 TABLET ORAL
Status: COMPLETED | OUTPATIENT
Start: 2022-10-16 | End: 2022-10-16

## 2022-10-16 RX ADMIN — HYDROCODONE BITARTRATE AND ACETAMINOPHEN 1 TABLET: 5; 325 TABLET ORAL at 15:53

## 2022-10-16 NOTE — PROGRESS NOTES
[facility] [place] [facility] [place] [address] [address] [facility] Forester: [phone]  F: [phone]      Reason for Visit:   Betsy Triplett is a 74 y.o. male who is seen in consultation at the request of Ebony Bustos for evaluation of polycythemia vera. Hematology/Oncology Treatment History:   Diagnosed in 2012 at the [facility]  Bone marrow biopsy 4/1/2016: Hypercellular marrow with erythroid predominant trilineage   hematopoiesis including atypical megakaryocytic hyperplasia and <5% blasts. Moderate reticulin fibrosis. Stainable iron minimally present. MPN FISH panel NEGATIVE     History of Present Illness:   Betsy Triplett is a pleasant 74 y.o. male who was seen for evaluation of polycythemia vera. He has been managed for this at the [facility]. He also saw my colleague Dr. Deny Mills for a number of years, last in 2017. He has been managed with PRN therapeutic phlebotomy, done when HCT >45. For unclear reasons, he has not been started on hydrea. He does take ASA 81mg PO daily, did not tolerate the tablets but is tolerating liquid ASA. He denies history of VTE or MI. He had a possible CVA or TIA in 2021, at which time he was diagnosed with a carotid artery blockage. He was not compliant with his ASA at that time. He reports frequent episodes in the past 10 years with intermittent blindness or other visual disturbances. He started plavix in February after carotid artery surgery with Dr. Julio Masters. However, he is not clear how long he is supposed to be taking this. He has had considerable fatigue in the past several months. He reports needing phlebotomy every 1-3 months, last in September. Did not need it on recent check this month. He had a bad fall the other day while getting his boat out of the water. Went to the ED, diagnosed with rib fractures.       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    Sleep apnea 12/2011    doesn't use c-pap;  lost 25 lbs and has improved    Stroke [facility])     TIA  2021    Vertigo        Past Surgical History:   Procedure Laterality Date    HX CHOLECYSTECTOMY      HX COLONOSCOPY      HX ORTHOPAEDIC  1970    left wrist compound fracture    HX OTHER SURGICAL  2013    molar removal (dental)       Social History     Socioeconomic History    Marital status:      Spouse name: Not on file    Number of children: Not on file    Years of education: Not on file    Highest education level: Not on file   Occupational History    Not on file   Tobacco Use    Smoking status: Every Day     Packs/day: 1.00     Years: 40.00     Pack years: 40.00     Types: Cigarettes    Smokeless tobacco: Never   Vaping Use    Vaping Use: Never used   Substance and Sexual Activity    Alcohol use: No     Alcohol/week: 0.0 standard drinks    Drug use: Yes     Types: Marijuana     Comment: DAILY TO SOCIALLY    Sexual activity: Never   Other Topics Concern    Not on file   Social History Narrative    Not on file     Social Determinants of Health     Financial Resource Strain: Not on file   Food Insecurity: Not on file   Transportation Needs: Not on file   Physical Activity: Not on file   Stress: Not on file   Social Connections: Not on file   Intimate Partner Violence: Not on file   Housing Stability: Not on file       Family History   Problem Relation Age of Onset    Cancer Father     Heart Disease Father     Heart Disease Mother     Kidney Disease Mother Diabetes Brother        Current Outpatient Medications   Medication Sig    HYDROcodone-acetaminophen (Norco) 5-325 mg per tablet Take 1 Tablet by mouth every four (4) hours as needed for Pain for up to 3 days. Max Daily Amount: 6 Tablets. ibuprofen (MOTRIN) 800 mg tablet Take 1 Tablet by mouth every six (6) hours as needed for Pain (fever) for up to 8 days. lidocaine (Lido Vance) 4 % patch Apply as needed every 12 hours for pain    tamsulosin (FLOMAX) 0.4 mg capsule TAKE 1 CAPSULE BY MOUTH EVERY DAY    lisinopriL (PRINIVIL, ZESTRIL) 5 mg tablet Take 4 Tablets by mouth daily. valACYclovir (VALTREX) 500 mg tablet TAKE 1 TABLET BY MOUTH EVERY DAY    colchicine (MITIGARE) 0.6 mg capsule TAKE 1 CAPSULE BY MOUTH TWICE A DAY    clopidogreL (Plavix) 75 mg tab Take 1 Tablet by mouth daily. colchicine 0.6 mg tablet Take 0.6 mg by mouth daily. PRN    cholecalciferol (VITAMIN D3) (1000 Units /25 mcg) tablet Take 5,000 Units by mouth daily (with lunch). traMADoL (ULTRAM) 50 mg tablet Take 50 mg by mouth every six (6) hours as needed for Pain. aspirin 81 mg chewable tablet Take 81 mg by mouth daily. cyanocobalamin (VITAMIN B-12) 1,000 mcg sublingual tablet Take 5,000 mcg by mouth every Monday, Wednesday, Friday. fluticasone propionate (FLONASE) 50 mcg/actuation nasal spray 2 Sprays daily as needed. LEFT NARE     No current facility-administered medications for this visit. Allergies   Allergen Reactions    Sulfa (Sulfonamide Antibiotics) Anaphylaxis, Hives and Unknown (comments)    Sulfur Anaphylaxis, Hives and Seizures    Zyprexa [Olanzapine] Anaphylaxis    Allopurinol Other (comments)     anxiety    Celexa [Citalopram] Other (comments)     groggy    Clindamycin Nausea and Vomiting    Lisinopril Other (comments)     Denies allergy to this medication. Patient states he is currently taking.     Prozac [Fluoxetine] Anxiety    Risperidone Anxiety          Review of Systems: A complete review of systems was obtained, reviewed. Pertinent findings reviewed above. Physical Exam:   Visit Vitals  BP (!) 151/68 (BP 1 Location: Left upper arm, BP Patient Position: Sitting, BP Cuff Size: Large adult) Comment: . Pulse 66   Temp 98 °F (36.7 °C) (Esophageal)   Resp 18   Ht 5' 10\" (1.778 m)   Wt 191 lb (86.6 kg)   SpO2 98%   BMI 27.41 kg/m²     General: no distress  Eyes: PERRLA, anicteric sclerae  HENT: atraumatic, oropharynx clear  Neck: supple  Lymphatic: no cervical, supraclavicular, or inguinal adenopathy  Respiratory: CTAB, normal respiratory effort  CV: normal rate, regular rhythm, no murmurs, no peripheral edema  GI: soft, nontender, nondistended, no masses, no hepatomegaly, no splenomegaly  MS: digits without clubbing or cyanosis. Skin: no rashes, no ecchymoses, no petechia. normal temperature, turgor, and texture. Psych: alert, oriented, appropriate affect, normal judgment/insight    Results:     Lab Results   Component Value Date/Time    WBC 7.7 02/10/2022 02:00 AM    HGB 11.0 (L) 02/10/2022 02:00 AM    HCT 39.5 02/10/2022 02:00 AM    PLATELET 889 (H) 66/80/4063 02:00 AM    MCV 65.5 (L) 02/10/2022 02:00 AM    ABS.  NEUTROPHILS 4.3 02/10/2022 02:00 AM    Hemoglobin (POC) 14.2 04/24/2015 08:39 AM     Lab Results   Component Value Date/Time    Sodium 139 02/10/2022 02:00 AM    Potassium 3.8 02/10/2022 02:00 AM    Chloride 107 02/10/2022 02:00 AM    CO2 28 02/10/2022 02:00 AM    Glucose 118 (H) 02/10/2022 02:00 AM    BUN 20 02/10/2022 02:00 AM    Creatinine 0.96 02/10/2022 02:00 AM    GFR est AA >60 02/10/2022 02:00 AM    GFR est non-AA >60 02/10/2022 02:00 AM    Calcium 8.4 (L) 02/10/2022 02:00 AM    Sodium (POC) 142 02/09/2022 10:15 PM    Potassium (POC) 3.8 02/09/2022 10:15 PM    Chloride (POC) 104 02/09/2022 10:15 PM    Glucose (POC) 108 (H) 02/09/2022 10:15 PM    Glucose (POC) 114 02/09/2022 10:10 PM    Creatinine (POC) 0.80 02/09/2022 10:15 PM    Calcium, ionized (POC) 1.12 02/09/2022 10:15 PM     Lab Results Component Value Date/Time    Bilirubin, total 0.4 02/10/2022 02:00 AM    ALT (SGPT) 28 02/10/2022 02:00 AM    Alk. phosphatase 71 02/10/2022 02:00 AM    Protein, total 6.7 02/10/2022 02:00 AM    Albumin 3.7 02/10/2022 02:00 AM    Globulin 3.0 02/10/2022 02:00 AM     Lab Results   Component Value Date/Time    Iron % saturation 4 (L) 03/09/2017 03:15 PM    TIBC 473 (H) 03/09/2017 03:15 PM    Ferritin 6 (L) 03/09/2017 03:15 PM    Vitamin B12 1,310 (H) 02/10/2022 02:00 AM    Folate 14.8 02/10/2022 02:00 AM     05/21/2018 11:06 AM    Sed rate (ESR) 5 05/21/2018 11:06 AM    C-Reactive protein <0.29 02/09/2022 11:32 PM    TSH 1.72 02/09/2022 11:32 PM    Lipase 66 (L) 04/23/2014 04:58 AM    Hep C Virus Ab 0.2 08/30/2021 12:00 AM    HIV 1/2 Interpretation NONREACTIVE 03/31/2021 03:04 PM       Lab Results   Component Value Date/Time    INR 1.1 02/09/2022 10:21 PM    aPTT 27.3 03/31/2021 09:35 AM         10/12/2022:  WBC: 7.2  HGB: 12.1  HCF: 42.7  PLT: 525      Records from PCP, Dr. Jose Azevedo, and [facility] reviewed and summarized above. Test results above have been reviewed. Assessment:   1) Polycythemia Vera  JAK2+  He is JAK2+ based on reports from the [facility].  He has high risk disease based on age >63yo, as well as possible TIA or CVA history. He is at high risk for recurrent thrombotic events. He should continue to receive therapeutic phlebotomy to keep his HCT <45. However, he should also be taking cytoreductive therapy such as hydrea, which was my recommendation today. He is agreeable. The risks and benefits of therapy were discussed today and the patient has consented to receiving treatment. 2) Thrombocytosis  Secondary to #1 and associated iron deficiency related to therapeutic phlebotomy. As above, start hydrea. Continue ASA. 3) PVD  Follow up with Dr. Carol Phillips, defer to him whether plavix is needed.     Plan:     Start hydrea  Therapeutic phlebotomy PRN for HCT >45  Labs monthly: CBC, CMP  Return to see me in 3 months    Signed By: Janett Stovall MD

## 2022-10-16 NOTE — ED TRIAGE NOTES
Pt to ER with c/o left rib pain, right ring finger pain, and elbow pain after slipping at a boat ramp. Pt denies head impact or loc. Pt reports he does take a blood thinner.

## 2022-10-16 NOTE — ED PROVIDER NOTES
The history is provided by the patient. Rib Injury  This is a new problem. The average episode lasts 3 hours. The problem occurs continuously. The current episode started 3 to 5 hours ago. The problem has not changed since onset. Associated symptoms include chest pain. Pertinent negatives include no fever, no cough, no syncope and no abdominal pain. Associated symptoms comments: Right hand pain and scrape to right elbow. Precipitated by: fall onto a boat ramp. He has tried nothing for the symptoms. He has had No prior hospitalizations.       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    Sleep apnea 12/2011    doesn't use c-pap;  lost 25 lbs and has improved    Stroke [facility])     TIA  2021    Vertigo        Past Surgical History:   Procedure Laterality Date    HX CHOLECYSTECTOMY      HX COLONOSCOPY      HX ORTHOPAEDIC  1970    left wrist compound fracture    HX OTHER SURGICAL  2013    molar removal (dental)         Family History:   Problem Relation Age of Onset    Cancer Father     Heart Disease Father     Heart Disease Mother     Kidney Disease Mother     Diabetes Brother        Social History     Socioeconomic History    Marital status:      Spouse name: Not on file    Number of children: Not on file    Years of education: Not on file    Highest education level: Not on file   Occupational History    Not on file   Tobacco Use    Smoking status: Every Day     Packs/day: 1.00     Years: 40.00     Pack years: 40.00     Types: Cigarettes    Smokeless tobacco: Never   Vaping Use    Vaping Use: Never used   Substance and Sexual Activity    Alcohol use: No     Alcohol/week: 0.0 standard drinks    Drug use: Yes     Types: Marijuana     Comment: DAILY TO SOCIALLY    Sexual activity: Never   Other Topics Concern    Not on file   Social History Narrative    Not on file     Social Determinants of Health     Financial Resource Strain: Not on file   Food Insecurity: Not on file   Transportation Needs: Not on file   Physical Activity: Not on file   Stress: Not on file   Social Connections: Not on file   Intimate Partner Violence: Not on file   Housing Stability: Not on file         ALLERGIES: Sulfa (sulfonamide antibiotics), Sulfur, Zyprexa [olanzapine], Allopurinol, Celexa [citalopram], Clindamycin, Lisinopril, Prozac [fluoxetine], and Risperidone    Review of Systems   Constitutional:  Negative for fever. Respiratory:  Negative for cough. Cardiovascular:  Positive for chest pain. Negative for syncope. Gastrointestinal:  Negative for abdominal pain. All other systems reviewed and are negative. Vitals:    10/16/22 1452 10/16/22 1500 10/16/22 1530   BP: (!) 182/84 (!) 170/88 (!) 167/76   Pulse: 65     Resp: 16     Temp: 97.8 °F (36.6 °C)     SpO2: 100% 99% 100%   Weight: 83.9 kg (185 lb)     Height: 5' 10\" (1.778 m)              Physical Exam  Vitals and nursing note reviewed. Constitutional:       General: He is not in acute distress. Appearance: He is well-developed. HENT:      Head: Normocephalic and atraumatic. Eyes:      Conjunctiva/sclera: Conjunctivae normal.   Neck:      Trachea: No tracheal deviation. Cardiovascular:      Rate and Rhythm: Normal rate and regular rhythm. Heart sounds: Normal heart sounds. Pulmonary:      Effort: Pulmonary effort is normal. No respiratory distress. Breath sounds: Normal breath sounds.    Chest:      Chest wall: Tenderness (with circular contusion and significant tenderness of left anterior chest) present. Abdominal:      General: There is no distension. Musculoskeletal:         General: No deformity. Normal range of motion. Cervical back: Neck supple. Comments: Right hand and finger abrasions with contusion to right ring finger and partial break of ulnar aspect of nail preserving eponychial fold. Right elbow abrasion without tenderness, hemostatic. Skin:     General: Skin is warm and dry. Neurological:      Mental Status: He is alert. Cranial Nerves: No cranial nerve deficit. Psychiatric:         Behavior: Behavior normal.           MDM       74 y.o. male presents with fall onto a boat ramp earlier today where he sustained 2 rib fractures and some scattered abrasions and contusions. Discussed possibility of developing pneumonia and pain control and incentive spirometry ordered for home care. Follow-up closely with primary care physician as needed for refills on pain medication and monitoring of symptoms. Return precautions were discussed for worsening or new concerning symptoms. Discussed wound care for abrasions.     Procedures

## 2022-10-19 ENCOUNTER — OFFICE VISIT (OUTPATIENT)
Dept: ONCOLOGY | Age: 67
End: 2022-10-19
Payer: COMMERCIAL

## 2022-10-19 VITALS
TEMPERATURE: 98 F | OXYGEN SATURATION: 98 % | DIASTOLIC BLOOD PRESSURE: 68 MMHG | SYSTOLIC BLOOD PRESSURE: 151 MMHG | WEIGHT: 191 LBS | BODY MASS INDEX: 27.35 KG/M2 | HEIGHT: 70 IN | RESPIRATION RATE: 18 BRPM | HEART RATE: 66 BPM

## 2022-10-19 DIAGNOSIS — D45 POLYCYTHEMIA VERA (HCC): Primary | ICD-10-CM

## 2022-10-19 PROCEDURE — 1101F PT FALLS ASSESS-DOCD LE1/YR: CPT | Performed by: INTERNAL MEDICINE

## 2022-10-19 PROCEDURE — 1123F ACP DISCUSS/DSCN MKR DOCD: CPT | Performed by: INTERNAL MEDICINE

## 2022-10-19 PROCEDURE — G8753 SYS BP > OR = 140: HCPCS | Performed by: INTERNAL MEDICINE

## 2022-10-19 PROCEDURE — G8536 NO DOC ELDER MAL SCRN: HCPCS | Performed by: INTERNAL MEDICINE

## 2022-10-19 PROCEDURE — G9717 DOC PT DX DEP/BP F/U NT REQ: HCPCS | Performed by: INTERNAL MEDICINE

## 2022-10-19 PROCEDURE — 3017F COLORECTAL CA SCREEN DOC REV: CPT | Performed by: INTERNAL MEDICINE

## 2022-10-19 PROCEDURE — G8417 CALC BMI ABV UP PARAM F/U: HCPCS | Performed by: INTERNAL MEDICINE

## 2022-10-19 PROCEDURE — G8754 DIAS BP LESS 90: HCPCS | Performed by: INTERNAL MEDICINE

## 2022-10-19 PROCEDURE — 99204 OFFICE O/P NEW MOD 45 MIN: CPT | Performed by: INTERNAL MEDICINE

## 2022-10-19 PROCEDURE — G8427 DOCREV CUR MEDS BY ELIG CLIN: HCPCS | Performed by: INTERNAL MEDICINE

## 2022-10-19 RX ORDER — HYDROXYUREA 500 MG/1
500 CAPSULE ORAL DAILY
Qty: 30 CAPSULE | Refills: 5 | Status: SHIPPED | OUTPATIENT
Start: 2022-10-19

## 2022-11-17 ENCOUNTER — TELEPHONE (OUTPATIENT)
Dept: ONCOLOGY | Age: 67
End: 2022-11-17

## 2022-11-17 NOTE — TELEPHONE ENCOUNTER
[address] at [facility] [address]  [phone]    11/17/22- Called patient in regards to [address] message concerning pain and questions about PE. Stated he's had chronic pain around his tailbone for years, and the acute flank pain he believes may have been muscular. He denies SOB, tachycardia, palpitations, leg swelling. Reviewed that these are symptoms to look out for with a PE. Stated he did experience a severe spasm in his right leg last week that lasted about 30 seconds. He's had chronic spasms in his left leg and follows with vascular surgery. Patient stated he started the Hydrea yesterday, had a HA and some nausea. Planning to have labs checked soon. Offered to see patient in the office to discuss questions/concerns in more detail. He was appreciative, but declined at this time. Stated he will keep us updated on his symptoms.

## 2022-11-17 NOTE — TELEPHONE ENCOUNTER
----- Message -----  From: Skylar Luis Antonio  Sent: 11/17/2022  12:15 AM EST  To: Med [address] [place]  Subject: General information                              Thank you again for the information! I do have another concern that I am hoping to get some help on, and need to ask about Pulmonary Embolism, clots and related symptoms. It involves pain for about 4 months and seems to be making its way up my back and seems to be raising past my kidneys. Right flank pain was acute last 2 days but improving. However, I am concerned about sensations possibly in the lower lungs. Does Pulmonary Embolism have symptoms? 2018 +- calcification of the 3-4 lumbar- surgery not needed then. Vascular Surgeon- Dr Dane Pillai today seemed ready to do a procedure ( using die and through the groin) looking for any blockages in my leg/toes - years of cramping and being woken (unusual in the fact that it is always while resting when symptoms occur- and I think it is a blood circulation issues ie. Clotting or who knows, DVT (?) related when asking would he be able to look at the aorta, he said yes. Any idea?   Thanks   Avni Cowart

## 2022-11-22 ENCOUNTER — TELEPHONE (OUTPATIENT)
Dept: ONCOLOGY | Age: 67
End: 2022-11-22

## 2022-11-22 NOTE — TELEPHONE ENCOUNTER
[address] at [place] [address]  [phone]    I defer to Dr. Jhoan Duncan regarding the plavix. He does not need to take that for his PV. The aspirin is sufficient for his PV. He does not have anemia. In fact, polycythemia is quite the opposite. I recommend therapeutic phlebotomy to reduce his risk of stroke, heart attack, blood clots, pulmonary embolisms, etc.  He can decline or delay or have a reduced volume removed if he prefers, but he needs to understand this would carry an increased risk of from thrombosis.

## 2022-11-22 NOTE — TELEPHONE ENCOUNTER
[employer] at [facility]  [phone]    11/22/22- Informed patient that per Dr. Andres Abbott reviewed. HCT 46.4, above goal of 45, recommend therapeutic phlebotomy sometime soon. PLT stable at 530, from 525 last month. Continue hydrea 500mg PO daily. Repeat labs monthly. Hopefully we will see his counts improve with more time on this medication. If they do not, we will consider adjusting his dose at his follow up visit. \"    Patient is hesitant about doing another phlebotomy at this time as he's concerned about anemia. Stated he had a bad \"anemic episode\" about 3-4 weeks ago when he felt extremely cold and tired. Reviewed that his Hgb was 12.8 yesterday which is within normal limits. Patient stated he first started experiencing these episodes when his Hgb was 11, and that to him is worse than having PV. Stated he would be agreeable to drawing off a smaller amount of blood, like 300 mL, or delaying phlebotomy for 1-2 weeks. He also wanted Dr. Collins Urbina input about discontinuing Plavix. Stated he's had rectal bleeding since having a prostate exam a couple weeks ago. He was started on Plavix after carotid artery surgery with Dr. Murali Yoo. Stated he discussed this with Dr. Murali Yoo last week as well and was told he could stop it and remain just on ASA. Will discuss the above with Dr. Vernon Jay and call patient back.

## 2022-11-22 NOTE — TELEPHONE ENCOUNTER
Patient asked to be connected to you insist on talking to you and would like a call back and wants to know lab results

## 2022-11-22 NOTE — TELEPHONE ENCOUNTER
[employer] at [facility]  [phone]    Labs reviewed. HCT 46.4, above goal of 45, recommend therapeutic phlebotomy sometime soon. PLT stable at 530, from 525 last month. Continue hydrea 500mg PO daily. Repeat labs monthly. Hopefully we will see his counts improve with more time on this medication. If they do not, we will consider adjusting his dose at his follow up visit.

## 2022-11-23 NOTE — TELEPHONE ENCOUNTER
[employer] at [facility]  [phone]    11/23/22- Attempted to call pt back to discuss the following below per - VM left. 10:23 AM- Informed pt of the following below- he verbalized understanding. He would like to delay phlebotomy to next week. NO further questions or concerns. I defer to Dr. Vilma Whitney regarding the plavix. He does not need to take that for his PV. The aspirin is sufficient for his PV. He does not have anemia. In fact, polycythemia is quite the opposite. I recommend therapeutic phlebotomy to reduce his risk of stroke, heart attack, blood clots, pulmonary embolisms, etc.  He can decline or delay or have a reduced volume removed if he prefers, but he needs to understand this would carry an increased risk of from thrombosis.

## 2022-11-28 ENCOUNTER — HOSPITAL ENCOUNTER (OUTPATIENT)
Dept: INFUSION THERAPY | Age: 67
Discharge: HOME | End: 2022-11-28
Payer: COMMERCIAL

## 2022-11-28 ENCOUNTER — APPOINTMENT (OUTPATIENT)
Dept: INFUSION THERAPY | Age: 67
End: 2022-11-28

## 2022-11-28 VITALS
SYSTOLIC BLOOD PRESSURE: 142 MMHG | TEMPERATURE: 97.5 F | OXYGEN SATURATION: 98 % | BODY MASS INDEX: 26.03 KG/M2 | WEIGHT: 181.8 LBS | HEART RATE: 85 BPM | DIASTOLIC BLOOD PRESSURE: 83 MMHG | RESPIRATION RATE: 16 BRPM | HEIGHT: 70 IN

## 2022-11-28 DIAGNOSIS — D45 POLYCYTHEMIA VERA (HCC): Primary | ICD-10-CM

## 2022-11-28 PROCEDURE — 99195 PHLEBOTOMY: CPT

## 2022-11-28 NOTE — PROGRESS NOTES
[facility] Progress Note    Date: 2022    Name: Bernadene Claude    MRN: [id number]         : 1955    Mr. Glen Stanford  arrived ambulatory and in no distress for therapeutic phlebotomy. Assessment was completed, no acute issues at this time, no new complaints voiced. Mr. Janice Sandoval vitals were reviewed. Visit Vitals  BP (!) 142/83   Pulse 85   Temp 97.5 °F (36.4 °C)   Resp 16   Ht 5' 10\" (1.778 m)   Wt 82.5 kg (181 lb 12.8 oz)   SpO2 98%   BMI 26.09 kg/m²              Left hand vein accessed using 20g IV phlebotomy set. 500 ml whole blood removed without difficulty, pt tolerated well. Procedure completed, manual pressure applied until hemostasis noted; wrapped with coban. Nourishment provided. VS stable, pt denies lightheadedness/dizziness. Pt D/Cd from [facility] ambulatory and in no distress. Next appt to be determined by MD patient aware. Mr. Glen Stanford tolerated treatment well and was discharged from [place] [address] in stable condition at 1530. Patient is to return as determined by Dr. Susan Wilder for his next appointment.     [facility]  2022

## 2022-12-01 ENCOUNTER — APPOINTMENT (OUTPATIENT)
Dept: INFUSION THERAPY | Age: 67
End: 2022-12-01

## 2022-12-12 ENCOUNTER — DOCUMENTATION ONLY (OUTPATIENT)
Dept: FAMILY MEDICINE CLINIC | Age: 67
End: 2022-12-12

## 2022-12-12 NOTE — PROGRESS NOTES
[facility]. Dates he is looking for is 1.11.2023 thru 1.18.2023. He has an apt with Janeth Mejia on 1.11.23 for labs, was going to see if he wanted to do it then or another day with Xander Baxter.

## 2022-12-25 ENCOUNTER — TELEPHONE (OUTPATIENT)
Dept: FAMILY MEDICINE CLINIC | Age: 67
End: 2022-12-25

## 2022-12-25 NOTE — TELEPHONE ENCOUNTER
I spoke to patient as provider on call. C/o nasal congestion, post nasal drip, dry cough, swollen glands in neck. Negative covid test last night. No CP, SOB, difficulty breathing. [facility] for evaluation as office is closed today and tomorrow. He voiced understanding.

## 2023-01-18 NOTE — PROGRESS NOTES
[facility] [place] at [address], [address] [place] [address]  Jeanie Butter: [phone]  F: [phone]      Reason for Visit:   Valeri Gowers is a 74 y.o. male who is seen in consultation at the request of Desirae Renee for evaluation of polycythemia vera. Hematology/Oncology Treatment History:   Diagnosed in 2012 at the [facility]  Bone marrow biopsy 4/1/2016: Hypercellular marrow with erythroid predominant trilineage   hematopoiesis including atypical megakaryocytic hyperplasia and <5% blasts. Moderate reticulin fibrosis. Stainable iron minimally present. MPN FISH panel NEGATIVE   Hydrea initiated 11/2022    History of Present Illness:   He has been taking Hydrea since November, 500mg PO daily. He had therapeutic phlebotomy on 1/19/2023 due to HCT of 47.3. He overall is feeling much better. Sleeping better. Anxiety better. Still very fatigued. He has started taking some supplements that he bought on [employer]. He reports intermittent compliance with his hydrea in the past few months, he attributes this to some recent URIs. He remains on ASA 81mg daily. No bleeding. He stopped his plavix. Review of systems was obtained and pertinent findings reviewed above. Past medical history, social history, family history, medications, and allergies are located in the electronic medical record. Physical Exam:   Visit Vitals  BP (!) 190/77 (BP 1 Location: Right arm, BP Patient Position: Sitting, BP Cuff Size: Large adult) Comment: .    Pulse 74   Temp 96.8 °F (36 °C) (Temporal)   Resp 18   Ht 5' 10\" (1.778 m)   Wt 192 lb (87.1 kg)   SpO2 98%   BMI 27.55 kg/m²     General: no distress  Respiratory: normal respiratory effort  CV: no peripheral edema  Skin: no rashes; no ecchymoses; no petechiae      Results:     Lab Results   Component Value Date/Time    WBC 9.5 01/18/2023 10:14 AM    HGB 13.4 01/18/2023 10:14 AM    HCT 47.3 01/18/2023 10:14 AM    PLATELET 653 01/18/2023 10:14 AM    MCV 73.4 (L) 01/18/2023 10:14 AM    ABS. NEUTROPHILS 5.4 01/18/2023 10:14 AM    Hemoglobin (POC) 14.2 04/24/2015 08:39 AM     Lab Results   Component Value Date/Time    Sodium 143 01/18/2023 10:14 AM    Potassium 4.6 01/18/2023 10:14 AM    Chloride 109 (H) 01/18/2023 10:14 AM    CO2 29 01/18/2023 10:14 AM    Glucose 95 01/18/2023 10:14 AM    BUN 23 (H) 01/18/2023 10:14 AM    Creatinine 1.10 01/18/2023 10:14 AM    GFR est AA >60 02/10/2022 02:00 AM    GFR est non-AA >60 02/10/2022 02:00 AM    Calcium 9.0 01/18/2023 10:14 AM    Sodium (POC) 142 02/09/2022 10:15 PM    Potassium (POC) 3.8 02/09/2022 10:15 PM    Chloride (POC) 104 02/09/2022 10:15 PM    Glucose (POC) 108 (H) 02/09/2022 10:15 PM    Glucose (POC) 114 02/09/2022 10:10 PM    Creatinine (POC) 0.80 02/09/2022 10:15 PM    Calcium, ionized (POC) 1.12 02/09/2022 10:15 PM     Lab Results   Component Value Date/Time    Bilirubin, total 0.3 01/18/2023 10:14 AM    ALT (SGPT) 26 01/18/2023 10:14 AM    Alk.  phosphatase 84 01/18/2023 10:14 AM    Protein, total 7.0 01/18/2023 10:14 AM    Albumin 4.0 01/18/2023 10:14 AM    Globulin 3.0 01/18/2023 10:14 AM     Lab Results   Component Value Date/Time    Iron % saturation 4 (L) 03/09/2017 03:15 PM    TIBC 473 (H) 03/09/2017 03:15 PM    Ferritin 6 (L) 03/09/2017 03:15 PM    Vitamin B12 1,310 (H) 02/10/2022 02:00 AM    Folate 14.8 02/10/2022 02:00 AM     05/21/2018 11:06 AM    Sed rate (ESR) 5 05/21/2018 11:06 AM    C-Reactive protein <0.29 02/09/2022 11:32 PM    TSH 1.72 02/09/2022 11:32 PM    Lipase 66 (L) 04/23/2014 04:58 AM    Hep C Virus Ab 0.2 08/30/2021 12:00 AM    HIV 1/2 Interpretation NONREACTIVE 03/31/2021 03:04 PM       Lab Results   Component Value Date/Time    INR 1.1 02/09/2022 10:21 PM    aPTT 27.3 03/31/2021 09:35 AM         10/12/2022:  WBC: 7.2  HGB: 12.1  HCF: 42.7  PLT: 525        Assessment:   1) Polycythemia Vera  JAK2+  He is JAK2+ based on reports from the [facility].  He has high risk disease based on age >63yo, as well as possible TIA/CVA history. He is at high risk for recurrent thrombotic events. He initiated cytoreductive therapy with hydrea in 11/2022. He is tolerating hydrea reasonably well at 500mg PO daily. Last therapeutic phlebotomy was 1/19/2023, due to his recent HCT of 47.3. Today we discussed increasing his hydrea to 1000mg daily. He asks to hold off on this and work on improving his compliance with therapy. We will continue to monitor and perform phlebotomy with HCT >45. He should continue ASA 81mg PO daily long-term to reduce his risk of thrombotic complications. 2) Thrombocytosis  Secondary to #1 and associated iron deficiency related to therapeutic phlebotomy. PLT have normalized since starting hydrea. Monitor.     Plan:     Continue hydrea 500mg PO daily  Therapeutic phlebotomy PRN for HCT >45  Labs in 4 weeks: CBC  Labs in 3 months: CBC, CMP  Return to see me in 3 months    Signed By: Bonnie Mo MD

## 2023-01-19 ENCOUNTER — HOSPITAL ENCOUNTER (OUTPATIENT)
Dept: INFUSION THERAPY | Age: 68
Discharge: HOME | End: 2023-01-19
Payer: COMMERCIAL

## 2023-01-19 ENCOUNTER — TELEPHONE (OUTPATIENT)
Dept: ONCOLOGY | Age: 68
End: 2023-01-19

## 2023-01-19 VITALS
SYSTOLIC BLOOD PRESSURE: 177 MMHG | TEMPERATURE: 97.6 F | RESPIRATION RATE: 17 BRPM | HEIGHT: 70 IN | WEIGHT: 192.3 LBS | OXYGEN SATURATION: 99 % | BODY MASS INDEX: 27.53 KG/M2 | DIASTOLIC BLOOD PRESSURE: 99 MMHG | HEART RATE: 60 BPM

## 2023-01-19 DIAGNOSIS — D45 POLYCYTHEMIA VERA (HCC): Primary | ICD-10-CM

## 2023-01-19 PROCEDURE — 99195 PHLEBOTOMY: CPT

## 2023-01-19 NOTE — TELEPHONE ENCOUNTER
[address] at [place]  [phone]    Labs reviewed. HCT up to 47.3, which is above goal of <45. I recommend therapeutic phlebotomy, we can try to arrange for this the same day he sees us next week. We likely will need to adjust his Hydrea dose as well, we can discuss that at his appointment.

## 2023-01-19 NOTE — TELEPHONE ENCOUNTER
[address] at [facility]  [phone]    01/19/23- Informed patient of Hct 47.3 and Dr. Enrique Yhe recommendation for therapeutic phlebotomy sometime soon. Patient verbalized understanding and requested an appointment for today or tomorrow. Patient scheduled for therapeutic phlebotomy today at 3:30 pm.  No further questions or concerns.

## 2023-01-19 NOTE — PROGRESS NOTES
[facility] Progress Note    Date: 2023    Name: Maribel Banegas    MRN: [id number]         : 1955    Mr. Lashae Meyers  arrived ambulatory and in no distress for therapeutic phlebotomy. Assessment was completed, no acute issues at this time, no new complaints voiced. Mr. David Varner vitals were reviewed. Visit Vitals  BP (!) 177/99   Pulse 60   Temp 97.6 °F (36.4 °C)   Resp 17   Ht 5' 10\" (1.778 m)   Wt 87.2 kg (192 lb 4.8 oz)   SpO2 99%   BMI 27.59 kg/m²              Left arm vein accessed using 20 g therapeutic phlebotomy set. Approximately 450ml whole blood removed d/t phlebotomy line stopped infusing, pt tolerated well. Pt requested to f/u with provider before continuing. Procedure completed, manual pressure applied until hemostasis noted; wrapped with coban. Nourishment offered and pt declined. Mr. Lashae Meyers tolerated treatment well and was discharged from [place] [address] in stable condition at 1615. Patient is to return on 2023 at 1300 for his next appointment.     Christine Roche  2023

## 2023-01-26 ENCOUNTER — OFFICE VISIT (OUTPATIENT)
Dept: ONCOLOGY | Age: 68
End: 2023-01-26
Payer: COMMERCIAL

## 2023-01-26 VITALS
OXYGEN SATURATION: 98 % | SYSTOLIC BLOOD PRESSURE: 190 MMHG | RESPIRATION RATE: 18 BRPM | DIASTOLIC BLOOD PRESSURE: 77 MMHG | TEMPERATURE: 96.8 F | HEART RATE: 74 BPM | HEIGHT: 70 IN | BODY MASS INDEX: 27.49 KG/M2 | WEIGHT: 192 LBS

## 2023-01-26 DIAGNOSIS — D45 POLYCYTHEMIA VERA (HCC): Primary | ICD-10-CM

## 2023-01-26 PROCEDURE — 99214 OFFICE O/P EST MOD 30 MIN: CPT | Performed by: INTERNAL MEDICINE

## 2023-01-26 PROCEDURE — 1123F ACP DISCUSS/DSCN MKR DOCD: CPT | Performed by: INTERNAL MEDICINE

## 2023-01-26 PROCEDURE — 3077F SYST BP >= 140 MM HG: CPT | Performed by: INTERNAL MEDICINE

## 2023-01-26 PROCEDURE — 3078F DIAST BP <80 MM HG: CPT | Performed by: INTERNAL MEDICINE

## 2023-01-26 NOTE — PROGRESS NOTES
Chapo Born is a 74 y.o. male follow up for polycythemia. 1. Have you been to the ER, urgent care clinic since your last visit? Hospitalized since your last visit?no    2. Have you seen or consulted any other health care providers outside of the [address] [place] [address] since your last visit? Include any pap smears or colon screening.  No

## 2023-04-14 ENCOUNTER — HOSPITAL ENCOUNTER (OUTPATIENT)
Dept: INFUSION THERAPY | Age: 68
Discharge: HOME | End: 2023-04-14
Payer: COMMERCIAL

## 2023-04-14 VITALS
SYSTOLIC BLOOD PRESSURE: 184 MMHG | RESPIRATION RATE: 16 BRPM | WEIGHT: 188.7 LBS | OXYGEN SATURATION: 98 % | HEIGHT: 70 IN | DIASTOLIC BLOOD PRESSURE: 84 MMHG | BODY MASS INDEX: 27.01 KG/M2 | TEMPERATURE: 98.6 F | HEART RATE: 77 BPM

## 2023-04-14 DIAGNOSIS — D45 POLYCYTHEMIA VERA (HCC): Primary | Chronic | ICD-10-CM

## 2023-04-14 PROCEDURE — 99195 PHLEBOTOMY: CPT

## 2023-04-14 NOTE — PROGRESS NOTES
[facility] VISIT NOTE      Date: 2023    Name: Mariajose Her    MRN: [id number]         : 1955      Pt arrived at [facility] ambulatory and in no distress for therapeutic phlebotomy. Assessment completed, patient reports not feeling well, having some abdominal pain and head pressure consistent with his HCT being elevated. (HCT from 2023 was 49.7). Mr. Eyal Bautista vitals were reviewed (patient has baseline HTN). Visit Vitals  BP (!) 184/84 (BP 1 Location: Left arm, BP Patient Position: Sitting)   Pulse 77   Temp 98.6 °F (37 °C)   Resp 16   Ht 5' 10\" (1.778 m)   Wt 85.6 kg (188 lb 11.2 oz)   SpO2 98%   BMI 27.08 kg/m²        500 mL whole blood removed with 18G needled from left forearm. 2x2 with coban placed. Vital signs stable, patient offered snack prior to discharge. Patient stayed 30 minutes after for observations prior to discharge. Tolerated treatment well, no adverse reaction noted. He denies any dizziness or chest pain. States that he already \"feels better\". Mr. Grace Boyer was discharged from [place] [address] in stable condition at 1415. He is to follow up with his physician for his next appointment.     Carlos Freeman RN  2023

## 2023-04-17 DIAGNOSIS — D45 POLYCYTHEMIA VERA (HCC): Primary | Chronic | ICD-10-CM

## 2023-04-22 DIAGNOSIS — D45 POLYCYTHEMIA VERA (HCC): Primary | ICD-10-CM

## 2023-04-23 DIAGNOSIS — D45 POLYCYTHEMIA VERA (HCC): Primary | ICD-10-CM

## 2023-04-26 ENCOUNTER — TELEPHONE (OUTPATIENT)
Dept: ONCOLOGY | Age: 68
End: 2023-04-26

## 2023-04-26 NOTE — TELEPHONE ENCOUNTER
[address] at [place]  [phone]    04/26/23- Returned patient's call, no answer, voicemail left requesting a return call when available. 04/27/23- [employer] message sent to patient reviewing Hydrea dosing instructions.

## 2023-04-26 NOTE — TELEPHONE ENCOUNTER
Patient called and has some questions about his medications because what they bottle says and that Dr. Shana Tyson said are two different things.   #[phone]

## 2023-04-28 ENCOUNTER — HOSPITAL ENCOUNTER (OUTPATIENT)
Dept: INFUSION THERAPY | Age: 68
Discharge: HOME | End: 2023-04-28
Payer: COMMERCIAL

## 2023-04-28 ENCOUNTER — TELEPHONE (OUTPATIENT)
Dept: ONCOLOGY | Age: 68
End: 2023-04-28

## 2023-04-28 VITALS
OXYGEN SATURATION: 98 % | RESPIRATION RATE: 16 BRPM | SYSTOLIC BLOOD PRESSURE: 123 MMHG | HEART RATE: 76 BPM | BODY MASS INDEX: 27.08 KG/M2 | TEMPERATURE: 98.2 F | HEIGHT: 70 IN | DIASTOLIC BLOOD PRESSURE: 63 MMHG

## 2023-04-28 DIAGNOSIS — D45 POLYCYTHEMIA VERA (HCC): Primary | Chronic | ICD-10-CM

## 2023-04-28 PROCEDURE — 99195 PHLEBOTOMY: CPT

## 2023-04-28 NOTE — PROGRESS NOTES
[facility] Progress Note    Date: 2023    Name: Donnie Khan    MRN: [id number]         : 1955    Mr. Justin Durán  arrived ambulatory and in no distress for therapeutic phlebotomy. Assessment was completed, no acute issues at this time, no new complaints voiced. Labs drawn on 23 are within parameters for treatment today. Mr. Velasquez Nipple vitals were reviewed. Visit Vitals  /63   Pulse 76   Temp 98.2 °F (36.8 °C)   Resp 16   Ht 5' 10\" (1.778 m)   SpO2 98%   BMI 27.08 kg/m²              Left arm vein accessed using 18 g PIV.  500 ml whole blood removed without difficulty, pt tolerated well. Procedure completed, manual pressure applied until hemostasis noted; wrapped with coban. Nourishment provided. VS stable, pt denies lightheadedness/dizziness. Pt D/Cd from [facility] ambulatory and in no distress. Mr. Justin Durán tolerated treatment well and was discharged from [place] [address] in stable condition at 1500. Patient was advised to follow up with his physician regarding future appointments at the [facility].     Emma Hollingsworth RN  2023

## 2023-04-28 NOTE — TELEPHONE ENCOUNTER
[facility] at [place]  [phone]    04/28/23- Lab results reviewed with Dr. Peg Robles, Hct 45.6. Patient questioned if he could come in for phlebotomy today because he'll be out of town for the next week. Okay per Dr. Peg Robles.   [facility] appointment scheduled for today at 2 pm.

## 2023-05-30 DIAGNOSIS — D45 POLYCYTHEMIA VERA (HCC): ICD-10-CM

## 2023-05-30 LAB
ALBUMIN SERPL-MCNC: 4 G/DL (ref 3.5–5)
ALBUMIN/GLOB SERPL: 1.5 (ref 1.1–2.2)
ALP SERPL-CCNC: 78 U/L (ref 45–117)
ALT SERPL-CCNC: 25 U/L (ref 12–78)
ANION GAP SERPL CALC-SCNC: 4 MMOL/L (ref 5–15)
AST SERPL-CCNC: 13 U/L (ref 15–37)
BASOPHILS # BLD: 0.1 K/UL (ref 0–0.1)
BASOPHILS NFR BLD: 1 % (ref 0–1)
BILIRUB SERPL-MCNC: 0.6 MG/DL (ref 0.2–1)
BUN SERPL-MCNC: 19 MG/DL (ref 6–20)
BUN/CREAT SERPL: 17 (ref 12–20)
CALCIUM SERPL-MCNC: 8.5 MG/DL (ref 8.5–10.1)
CHLORIDE SERPL-SCNC: 108 MMOL/L (ref 97–108)
CO2 SERPL-SCNC: 29 MMOL/L (ref 21–32)
CREAT SERPL-MCNC: 1.14 MG/DL (ref 0.7–1.3)
DIFFERENTIAL METHOD BLD: ABNORMAL
EOSINOPHIL # BLD: 0.2 K/UL (ref 0–0.4)
EOSINOPHIL NFR BLD: 2 % (ref 0–7)
ERYTHROCYTE [DISTWIDTH] IN BLOOD BY AUTOMATED COUNT: 22.5 % (ref 11.5–14.5)
GLOBULIN SER CALC-MCNC: 2.7 G/DL (ref 2–4)
GLUCOSE SERPL-MCNC: 111 MG/DL (ref 65–100)
HCT VFR BLD AUTO: 41 % (ref 36.6–50.3)
HGB BLD-MCNC: 11.7 G/DL (ref 12.1–17)
IMM GRANULOCYTES # BLD AUTO: 0 K/UL
IMM GRANULOCYTES NFR BLD AUTO: 0 %
LYMPHOCYTES # BLD: 2.7 K/UL (ref 0.8–3.5)
LYMPHOCYTES NFR BLD: 36 % (ref 12–49)
MCH RBC QN AUTO: 24.1 PG (ref 26–34)
MCHC RBC AUTO-ENTMCNC: 28.5 G/DL (ref 30–36.5)
MCV RBC AUTO: 84.4 FL (ref 80–99)
MONOCYTES # BLD: 0.4 K/UL (ref 0–1)
MONOCYTES NFR BLD: 5 % (ref 5–13)
NEUTS SEG # BLD: 4.1 K/UL (ref 1.8–8)
NEUTS SEG NFR BLD: 56 % (ref 32–75)
NRBC # BLD: 0 K/UL (ref 0–0.01)
NRBC BLD-RTO: 0 PER 100 WBC
PLATELET # BLD AUTO: 217 K/UL (ref 150–400)
PMV BLD AUTO: 9.1 FL (ref 8.9–12.9)
POTASSIUM SERPL-SCNC: 4.5 MMOL/L (ref 3.5–5.1)
PROT SERPL-MCNC: 6.7 G/DL (ref 6.4–8.2)
RBC # BLD AUTO: 4.86 M/UL (ref 4.1–5.7)
RBC MORPH BLD: ABNORMAL
SODIUM SERPL-SCNC: 141 MMOL/L (ref 136–145)
WBC # BLD AUTO: 7.5 K/UL (ref 4.1–11.1)

## 2023-05-31 ENCOUNTER — OFFICE VISIT (OUTPATIENT)
Age: 68
End: 2023-05-31
Payer: COMMERCIAL

## 2023-05-31 VITALS
RESPIRATION RATE: 16 BRPM | HEIGHT: 70 IN | HEART RATE: 59 BPM | SYSTOLIC BLOOD PRESSURE: 177 MMHG | BODY MASS INDEX: 27.69 KG/M2 | TEMPERATURE: 97.4 F | WEIGHT: 193.4 LBS | DIASTOLIC BLOOD PRESSURE: 74 MMHG | OXYGEN SATURATION: 98 %

## 2023-05-31 DIAGNOSIS — M54.32 BILATERAL SCIATICA: Primary | ICD-10-CM

## 2023-05-31 DIAGNOSIS — D45 POLYCYTHEMIA VERA (HCC): Primary | ICD-10-CM

## 2023-05-31 DIAGNOSIS — M54.31 BILATERAL SCIATICA: Primary | ICD-10-CM

## 2023-05-31 PROCEDURE — 1123F ACP DISCUSS/DSCN MKR DOCD: CPT | Performed by: INTERNAL MEDICINE

## 2023-05-31 PROCEDURE — 3077F SYST BP >= 140 MM HG: CPT | Performed by: INTERNAL MEDICINE

## 2023-05-31 PROCEDURE — 99214 OFFICE O/P EST MOD 30 MIN: CPT | Performed by: INTERNAL MEDICINE

## 2023-05-31 PROCEDURE — 3078F DIAST BP <80 MM HG: CPT | Performed by: INTERNAL MEDICINE

## 2023-05-31 RX ORDER — LANOLIN ALCOHOL/MO/W.PET/CERES
800 CREAM (GRAM) TOPICAL DAILY
COMMUNITY

## 2023-05-31 NOTE — PROGRESS NOTES
Richard Cabrera is a 74 y.o. male here for follow up of polycythemia vera. 1. Have you been to the ER, urgent care clinic since your last visit? Hospitalized since your last visit? No    2. Have you seen or consulted any other health care providers outside of the [address] [place] [address] since your last visit? Include any pap smears or colon screening.  No

## 2023-06-21 LAB
BASOPHILS # BLD AUTO: 0.1 X10E3/UL (ref 0–0.2)
BASOPHILS NFR BLD AUTO: 1 %
EOSINOPHIL # BLD AUTO: 0.1 X10E3/UL (ref 0–0.4)
EOSINOPHIL NFR BLD AUTO: 2 %
ERYTHROCYTE [DISTWIDTH] IN BLOOD BY AUTOMATED COUNT: 19.8 % (ref 11.6–15.4)
HCT VFR BLD AUTO: 44.1 % (ref 37.5–51)
HGB BLD-MCNC: 13.2 G/DL (ref 13–17.7)
IMM GRANULOCYTES # BLD AUTO: 0 X10E3/UL (ref 0–0.1)
IMM GRANULOCYTES NFR BLD AUTO: 0 %
LYMPHOCYTES # BLD AUTO: 1.3 X10E3/UL (ref 0.7–3.1)
LYMPHOCYTES NFR BLD AUTO: 25 %
MCH RBC QN AUTO: 24.5 PG (ref 26.6–33)
MCHC RBC AUTO-ENTMCNC: 29.9 G/DL (ref 31.5–35.7)
MCV RBC AUTO: 82 FL (ref 79–97)
MONOCYTES # BLD AUTO: 0.4 X10E3/UL (ref 0.1–0.9)
MONOCYTES NFR BLD AUTO: 8 %
NEUTROPHILS # BLD AUTO: 3.4 X10E3/UL (ref 1.4–7)
NEUTROPHILS NFR BLD AUTO: 64 %
PLATELET # BLD AUTO: 260 X10E3/UL (ref 150–450)
RBC # BLD AUTO: 5.39 X10E6/UL (ref 4.14–5.8)
WBC # BLD AUTO: 5.2 X10E3/UL (ref 3.4–10.8)

## 2023-06-29 ENCOUNTER — TELEPHONE (OUTPATIENT)
Age: 68
End: 2023-06-29

## 2023-06-30 ENCOUNTER — TELEPHONE (OUTPATIENT)
Age: 68
End: 2023-06-30

## 2023-07-07 ENCOUNTER — TELEPHONE (OUTPATIENT)
Age: 68
End: 2023-07-07

## 2023-07-07 DIAGNOSIS — D45 POLYCYTHEMIA VERA (HCC): ICD-10-CM

## 2023-07-07 NOTE — TELEPHONE ENCOUNTER
Patient called and stated he would like a return call from the team. Please advise.     CB# [phone]

## 2023-07-07 NOTE — TELEPHONE ENCOUNTER
Pineda Sam at [facility]  [phone]    07/07/23- Patient reports intermittent head numbness for years. Stated it feels like there's a rubber band around his forehead. Stated he has the stye on his eye, and sores that come and go all over his face. Some sores come the surface he can pop, and some feel more internal.  He takes valacyclovir daily. Stated he's just not sure what to do about these recurring symptoms. Discussed with Dr. Declan Lowe, reassured patient that symptoms aren't typically related to PV or hydrea. Can have labs repeated to make sure everything looks okay. Recommend follow up with PCP, and possibly dermatology for further evaluation. Patient verbalized understanding.

## 2023-07-08 LAB
ALBUMIN SERPL-MCNC: 4.4 G/DL (ref 3.5–5)
ALBUMIN/GLOB SERPL: 1.4 (ref 1.1–2.2)
ALP SERPL-CCNC: 87 U/L (ref 45–117)
ALT SERPL-CCNC: 29 U/L (ref 12–78)
ANION GAP SERPL CALC-SCNC: 3 MMOL/L (ref 5–15)
AST SERPL-CCNC: 15 U/L (ref 15–37)
BASOPHILS # BLD: 0.1 K/UL (ref 0–0.1)
BASOPHILS NFR BLD: 1 % (ref 0–1)
BILIRUB SERPL-MCNC: 0.6 MG/DL (ref 0.2–1)
BUN SERPL-MCNC: 19 MG/DL (ref 6–20)
BUN/CREAT SERPL: 19 (ref 12–20)
CALCIUM SERPL-MCNC: 9.8 MG/DL (ref 8.5–10.1)
CHLORIDE SERPL-SCNC: 108 MMOL/L (ref 97–108)
CO2 SERPL-SCNC: 28 MMOL/L (ref 21–32)
CREAT SERPL-MCNC: 0.99 MG/DL (ref 0.7–1.3)
DIFFERENTIAL METHOD BLD: ABNORMAL
EOSINOPHIL # BLD: 0.1 K/UL (ref 0–0.4)
EOSINOPHIL NFR BLD: 1 % (ref 0–7)
ERYTHROCYTE [DISTWIDTH] IN BLOOD BY AUTOMATED COUNT: 20.3 % (ref 11.5–14.5)
GLOBULIN SER CALC-MCNC: 3.2 G/DL (ref 2–4)
GLUCOSE SERPL-MCNC: 112 MG/DL (ref 65–100)
HCT VFR BLD AUTO: 48.9 % (ref 36.6–50.3)
HGB BLD-MCNC: 14.3 G/DL (ref 12.1–17)
IMM GRANULOCYTES # BLD AUTO: 0 K/UL
IMM GRANULOCYTES NFR BLD AUTO: 0 %
LYMPHOCYTES # BLD: 1.7 K/UL (ref 0.8–3.5)
LYMPHOCYTES NFR BLD: 24 % (ref 12–49)
MCH RBC QN AUTO: 25.4 PG (ref 26–34)
MCHC RBC AUTO-ENTMCNC: 29.2 G/DL (ref 30–36.5)
MCV RBC AUTO: 87 FL (ref 80–99)
MONOCYTES # BLD: 0.4 K/UL (ref 0–1)
MONOCYTES NFR BLD: 5 % (ref 5–13)
NEUTS SEG # BLD: 4.9 K/UL (ref 1.8–8)
NEUTS SEG NFR BLD: 69 % (ref 32–75)
NRBC # BLD: 0.02 K/UL (ref 0–0.01)
NRBC BLD-RTO: 0.3 PER 100 WBC
PLATELET # BLD AUTO: 256 K/UL (ref 150–400)
PMV BLD AUTO: 9.6 FL (ref 8.9–12.9)
POTASSIUM SERPL-SCNC: 4.3 MMOL/L (ref 3.5–5.1)
PROT SERPL-MCNC: 7.6 G/DL (ref 6.4–8.2)
RBC # BLD AUTO: 5.62 M/UL (ref 4.1–5.7)
RBC MORPH BLD: ABNORMAL
SODIUM SERPL-SCNC: 139 MMOL/L (ref 136–145)
WBC # BLD AUTO: 7.2 K/UL (ref 4.1–11.1)

## 2023-07-10 RX ORDER — 0.9 % SODIUM CHLORIDE 0.9 %
250 INTRAVENOUS SOLUTION INTRAVENOUS ONCE
Status: CANCELLED | OUTPATIENT
Start: 2023-07-10 | End: 2023-07-10

## 2023-07-11 ENCOUNTER — HOSPITAL ENCOUNTER (OUTPATIENT)
Facility: HOSPITAL | Age: 68
Setting detail: INFUSION SERIES
End: 2023-07-11
Payer: COMMERCIAL

## 2023-07-11 VITALS
RESPIRATION RATE: 16 BRPM | TEMPERATURE: 98.3 F | SYSTOLIC BLOOD PRESSURE: 182 MMHG | DIASTOLIC BLOOD PRESSURE: 83 MMHG | HEART RATE: 84 BPM

## 2023-07-11 DIAGNOSIS — D45 POLYCYTHEMIA VERA (HCC): Primary | ICD-10-CM

## 2023-07-11 PROCEDURE — 99195 PHLEBOTOMY: CPT

## 2023-07-11 RX ORDER — 0.9 % SODIUM CHLORIDE 0.9 %
250 INTRAVENOUS SOLUTION INTRAVENOUS ONCE
Status: CANCELLED | OUTPATIENT
Start: 2023-07-11 | End: 2023-07-11

## 2023-07-11 RX ORDER — 0.9 % SODIUM CHLORIDE 0.9 %
250 INTRAVENOUS SOLUTION INTRAVENOUS ONCE
Status: DISCONTINUED | OUTPATIENT
Start: 2023-07-11 | End: 2023-10-29 | Stop reason: HOSPADM

## 2023-07-11 ASSESSMENT — PAIN SCALES - GENERAL: PAINLEVEL_OUTOF10: 3

## 2023-07-11 NOTE — PROGRESS NOTES
Pt arrived ambulatory and in no distress for therapeutic phlebotomy. Labs drawn on 7/7 and noted to be within treatment parameters. VS elevated and patient complaining  of angina at times along with muscle spasms which have been ongoing for years. Laine Borja notified of situation okay to proceed with phlebotomy, pt states he is following up with a vascular surgeon next week to discuss these symptoms. Vitals:    07/11/23 1600   BP: (!) 182/83   Pulse: 84   Resp:    Temp:        Left arm vein accessed using 18 g therapeutic phlebotomy set. 500 ml whole blood removed without difficulty, pt tolerated well. Procedure completed , manual pressure applied until hemostasis noted; wrapped with coban. Nourishment provided. VS stable, pt denies lightheadedness/dizziness. Pt D/Cd from [facility] ambulatory and in no distress, patient denied 30 min post observation. Next appt 9/8/23.     Leonides Pina RN

## 2023-07-23 ENCOUNTER — APPOINTMENT (OUTPATIENT)
Facility: HOSPITAL | Age: 68
End: 2023-07-23
Payer: COMMERCIAL

## 2023-07-23 ENCOUNTER — HOSPITAL ENCOUNTER (EMERGENCY)
Facility: HOSPITAL | Age: 68
Discharge: HOME | End: 2023-07-23
Attending: EMERGENCY MEDICINE
Payer: COMMERCIAL

## 2023-07-23 VITALS
WEIGHT: 185 LBS | DIASTOLIC BLOOD PRESSURE: 73 MMHG | TEMPERATURE: 98.3 F | BODY MASS INDEX: 26.48 KG/M2 | SYSTOLIC BLOOD PRESSURE: 139 MMHG | OXYGEN SATURATION: 98 % | HEART RATE: 72 BPM | RESPIRATION RATE: 16 BRPM | HEIGHT: 70 IN

## 2023-07-23 DIAGNOSIS — H66.90 ACUTE OTITIS MEDIA, UNSPECIFIED OTITIS MEDIA TYPE: Primary | ICD-10-CM

## 2023-07-23 LAB
ALBUMIN SERPL-MCNC: 4.1 G/DL (ref 3.5–5.2)
ALBUMIN/GLOB SERPL: 1.7 (ref 1.1–2.2)
ALP SERPL-CCNC: 74 U/L (ref 40–129)
ALT SERPL-CCNC: 12 U/L (ref 10–50)
ANION GAP SERPL CALC-SCNC: 12 MMOL/L (ref 5–15)
AST SERPL-CCNC: 10 U/L (ref 10–50)
BASOPHILS # BLD: 0.1 K/UL (ref 0–1)
BASOPHILS NFR BLD: 1 % (ref 0–1)
BILIRUB SERPL-MCNC: 0.3 MG/DL (ref 0.2–1)
BUN SERPL-MCNC: 23 MG/DL (ref 8–23)
BUN/CREAT SERPL: 23 (ref 12–20)
CALCIUM SERPL-MCNC: 8.7 MG/DL (ref 8.8–10.2)
CHLORIDE SERPL-SCNC: 108 MMOL/L (ref 98–107)
CO2 SERPL-SCNC: 26 MMOL/L (ref 22–29)
CREAT SERPL-MCNC: 1.01 MG/DL (ref 0.7–1.2)
DIFFERENTIAL METHOD BLD: ABNORMAL
EOSINOPHIL # BLD: 0.1 K/UL (ref 0–0.4)
EOSINOPHIL NFR BLD: 2 % (ref 0–7)
ERYTHROCYTE [DISTWIDTH] IN BLOOD BY AUTOMATED COUNT: 18.7 % (ref 11.5–14.5)
GLOBULIN SER CALC-MCNC: 2.4 G/DL (ref 2–4)
GLUCOSE SERPL-MCNC: 121 MG/DL (ref 65–100)
HCT VFR BLD AUTO: 42.1 % (ref 36.6–50.3)
HGB BLD-MCNC: 12.3 G/DL (ref 12.1–17)
IMM GRANULOCYTES # BLD AUTO: 0 K/UL (ref 0–0.04)
IMM GRANULOCYTES NFR BLD AUTO: 0 % (ref 0–0.5)
LYMPHOCYTES # BLD: 2 K/UL (ref 0.8–3.5)
LYMPHOCYTES NFR BLD: 31 % (ref 12–49)
MCH RBC QN AUTO: 25.7 PG (ref 26–34)
MCHC RBC AUTO-ENTMCNC: 29.2 G/DL (ref 30–36.5)
MCV RBC AUTO: 87.9 FL (ref 80–99)
MONOCYTES # BLD: 0.4 K/UL (ref 0–1)
MONOCYTES NFR BLD: 7 % (ref 5–13)
NEUTS SEG # BLD: 3.7 K/UL (ref 1.8–8)
NEUTS SEG NFR BLD: 59 % (ref 32–75)
NRBC # BLD: 0 K/UL (ref 0–0.01)
NRBC BLD-RTO: 0 PER 100 WBC
PLATELET # BLD AUTO: 196 K/UL (ref 150–400)
PMV BLD AUTO: 8.3 FL (ref 8.9–12.9)
POTASSIUM SERPL-SCNC: 4.2 MMOL/L (ref 3.5–5.1)
PROT SERPL-MCNC: 6.5 G/DL (ref 6.4–8.3)
RBC # BLD AUTO: 4.79 M/UL (ref 4.1–5.7)
SODIUM SERPL-SCNC: 146 MMOL/L (ref 136–145)
WBC # BLD AUTO: 6.2 K/UL (ref 4.1–11.1)

## 2023-07-23 PROCEDURE — 36415 COLL VENOUS BLD VENIPUNCTURE: CPT

## 2023-07-23 PROCEDURE — 70491 CT SOFT TISSUE NECK W/DYE: CPT

## 2023-07-23 PROCEDURE — 6360000004 HC RX CONTRAST MEDICATION: Performed by: EMERGENCY MEDICINE

## 2023-07-23 PROCEDURE — 85025 COMPLETE CBC W/AUTO DIFF WBC: CPT

## 2023-07-23 PROCEDURE — 80053 COMPREHEN METABOLIC PANEL: CPT

## 2023-07-23 PROCEDURE — 99285 EMERGENCY DEPT VISIT HI MDM: CPT

## 2023-07-23 RX ORDER — AMOXICILLIN AND CLAVULANATE POTASSIUM 875; 125 MG/1; MG/1
1 TABLET, FILM COATED ORAL 2 TIMES DAILY
Qty: 14 TABLET | Refills: 0 | Status: SHIPPED | OUTPATIENT
Start: 2023-07-23 | End: 2023-07-30

## 2023-07-23 RX ADMIN — IOPAMIDOL 100 ML: 755 INJECTION, SOLUTION INTRAVENOUS at 14:15

## 2023-07-23 ASSESSMENT — ENCOUNTER SYMPTOMS
CHEST TIGHTNESS: 0
ABDOMINAL PAIN: 0
BACK PAIN: 0
SORE THROAT: 1
COUGH: 0
EYE PAIN: 0
NAUSEA: 0
SHORTNESS OF BREATH: 0
WHEEZING: 0
VOMITING: 0
CONSTIPATION: 0
DIARRHEA: 0
RHINORRHEA: 0

## 2023-07-23 ASSESSMENT — PAIN SCALES - GENERAL: PAINLEVEL_OUTOF10: 3

## 2023-07-23 ASSESSMENT — PAIN DESCRIPTION - RADICULAR PAIN: RADICULAR_PAIN: ABSENT

## 2023-07-23 ASSESSMENT — LIFESTYLE VARIABLES
HOW MANY STANDARD DRINKS CONTAINING ALCOHOL DO YOU HAVE ON A TYPICAL DAY: PATIENT DOES NOT DRINK
HOW OFTEN DO YOU HAVE A DRINK CONTAINING ALCOHOL: NEVER

## 2023-07-23 ASSESSMENT — PAIN - FUNCTIONAL ASSESSMENT: PAIN_FUNCTIONAL_ASSESSMENT: 0-10

## 2023-07-23 ASSESSMENT — PAIN DESCRIPTION - LOCATION: LOCATION: EAR;THROAT

## 2023-07-23 NOTE — ED NOTES
Pt was discharged at this time. pt verbalized understanding of all discharge instructions. Pt remains a&ox3. Resps are even and unlabored. Skin warm and dry. No distress noted. Pt ambulated out of ed with a steady gait.        Stoney Wright RN  07/23/23 0590

## 2023-07-23 NOTE — ED TRIAGE NOTES
Pt here with intermittent sore throat x 1 week, left ear pain x 1 week, ringing in left ear x this morning. Nasal congestion x 1 week. Pt denies any treatment at home for symptoms.

## 2023-07-23 NOTE — ED PROVIDER NOTES
[facility] EMERGENCY DEPT  EMERGENCY DEPARTMENT ENCOUNTER      Pt Name: Velasquez Whitmore  MRN: [id number]  [facility] 1955  Date of evaluation: 7/23/2023  Provider: Yoli Contreras PA-C    CHIEF COMPLAINT       Chief Complaint   Patient presents with    Pharyngitis    Otalgia    Nasal Congestion         HISTORY OF PRESENT ILLNESS   (Location/Symptom, Timing/Onset, Context/Setting, Quality, Duration, Modifying Factors, Severity)  Note limiting factors. 30-year-old male with a past medical history of hypertension and polycythemia vera presents with 1 week of sore throat, left ear pain, and nasal congestion. Upon arrival to the emergency department today, patient reports that his sore throat has resolved but the left ear pain has persisted. Patient has not tried any medications yet for his symptoms. As of this morning, patient noticed ringing in his left ear with left-sided facial swelling. Denies any fevers or cough. No chest pain, shortness of breath, dizziness, abdominal pain, nausea, vomiting, changes in bowel or bladder. Review of External Medical Records:     Nursing Notes were reviewed. REVIEW OF SYSTEMS    (2-9 systems for level 4, 10 or more for level 5)     Review of Systems   Constitutional:  Negative for appetite change, chills and fever. HENT:  Positive for congestion, ear pain and sore throat. Negative for rhinorrhea. Eyes:  Negative for pain and visual disturbance. Respiratory:  Negative for cough, chest tightness, shortness of breath and wheezing. Cardiovascular:  Negative for chest pain and palpitations. Gastrointestinal:  Negative for abdominal pain, constipation, diarrhea, nausea and vomiting. Genitourinary:  Negative for decreased urine volume, difficulty urinating, dysuria, frequency, hematuria and urgency. Musculoskeletal:  Negative for back pain. Skin:  Negative for pallor and rash.    Neurological:  Negative for dizziness, syncope, weakness, light-headedness,

## 2023-08-18 ENCOUNTER — CLINICAL DOCUMENTATION (OUTPATIENT)
Age: 68
End: 2023-08-18

## 2023-08-18 NOTE — PROGRESS NOTES
Enrico Le at [facility].  Requested Medical Records from [facility] @ [phone]

## 2023-08-22 DIAGNOSIS — D45 POLYCYTHEMIA VERA (HCC): ICD-10-CM

## 2023-08-23 LAB
ERYTHROCYTE [DISTWIDTH] IN BLOOD BY AUTOMATED COUNT: 17.4 % (ref 11.5–14.5)
HCT VFR BLD AUTO: 48.1 % (ref 36.6–50.3)
HGB BLD-MCNC: 13.5 G/DL (ref 12.1–17)
MCH RBC QN AUTO: 24.5 PG (ref 26–34)
MCHC RBC AUTO-ENTMCNC: 28.1 G/DL (ref 30–36.5)
MCV RBC AUTO: 87.5 FL (ref 80–99)
NRBC # BLD: 0 K/UL (ref 0–0.01)
NRBC BLD-RTO: 0 PER 100 WBC
PLATELET # BLD AUTO: 281 K/UL (ref 150–400)
PMV BLD AUTO: 9.2 FL (ref 8.9–12.9)
RBC # BLD AUTO: 5.5 M/UL (ref 4.1–5.7)
WBC # BLD AUTO: 5.6 K/UL (ref 4.1–11.1)

## 2023-08-25 ENCOUNTER — HOSPITAL ENCOUNTER (OUTPATIENT)
Facility: HOSPITAL | Age: 68
Setting detail: INFUSION SERIES
End: 2023-08-25
Payer: COMMERCIAL

## 2023-08-25 VITALS
TEMPERATURE: 98 F | DIASTOLIC BLOOD PRESSURE: 80 MMHG | OXYGEN SATURATION: 95 % | BODY MASS INDEX: 27.59 KG/M2 | SYSTOLIC BLOOD PRESSURE: 172 MMHG | RESPIRATION RATE: 16 BRPM | WEIGHT: 192.3 LBS | HEART RATE: 70 BPM

## 2023-08-25 DIAGNOSIS — D45 POLYCYTHEMIA VERA (HCC): Primary | ICD-10-CM

## 2023-08-25 PROCEDURE — 99195 PHLEBOTOMY: CPT

## 2023-08-25 RX ORDER — 0.9 % SODIUM CHLORIDE 0.9 %
250 INTRAVENOUS SOLUTION INTRAVENOUS ONCE
Status: DISCONTINUED | OUTPATIENT
Start: 2023-08-25 | End: 2023-10-29 | Stop reason: HOSPADM

## 2023-08-25 RX ORDER — 0.9 % SODIUM CHLORIDE 0.9 %
250 INTRAVENOUS SOLUTION INTRAVENOUS ONCE
OUTPATIENT
Start: 2023-08-25 | End: 2023-08-25

## 2023-08-25 ASSESSMENT — PAIN SCALES - GENERAL: PAINLEVEL_OUTOF10: 0

## 2023-09-05 RX ORDER — VALACYCLOVIR HYDROCHLORIDE 500 MG/1
TABLET, FILM COATED ORAL
Qty: 20 TABLET | Refills: 5 | Status: SHIPPED | OUTPATIENT
Start: 2023-09-05

## 2023-09-05 RX ORDER — LISINOPRIL 5 MG/1
20 TABLET ORAL DAILY
Qty: 360 TABLET | Refills: 3 | Status: SHIPPED | OUTPATIENT
Start: 2023-09-05

## 2023-09-08 ENCOUNTER — OFFICE VISIT (OUTPATIENT)
Age: 68
End: 2023-09-08
Payer: COMMERCIAL

## 2023-09-08 VITALS
HEIGHT: 70 IN | BODY MASS INDEX: 27.09 KG/M2 | DIASTOLIC BLOOD PRESSURE: 82 MMHG | WEIGHT: 189.2 LBS | TEMPERATURE: 97.3 F | OXYGEN SATURATION: 96 % | SYSTOLIC BLOOD PRESSURE: 159 MMHG | HEART RATE: 76 BPM | RESPIRATION RATE: 16 BRPM

## 2023-09-08 DIAGNOSIS — D45 POLYCYTHEMIA VERA (HCC): Primary | ICD-10-CM

## 2023-09-08 PROCEDURE — 3079F DIAST BP 80-89 MM HG: CPT | Performed by: INTERNAL MEDICINE

## 2023-09-08 PROCEDURE — 1123F ACP DISCUSS/DSCN MKR DOCD: CPT | Performed by: INTERNAL MEDICINE

## 2023-09-08 PROCEDURE — 99214 OFFICE O/P EST MOD 30 MIN: CPT | Performed by: INTERNAL MEDICINE

## 2023-09-08 PROCEDURE — 3077F SYST BP >= 140 MM HG: CPT | Performed by: INTERNAL MEDICINE

## 2023-09-08 NOTE — PROGRESS NOTES
Theresa Severino is a 79 y.o. male follow up for polycythemia. 1. Have you been to the ER, urgent care clinic since your last visit? Hospitalized since your last visit?no     2. Have you seen or consulted any other health care providers outside of the [address] [place] [address] since your last visit? Include any pap smears or colon screening.  no

## 2023-09-12 ENCOUNTER — TELEPHONE (OUTPATIENT)
Age: 68
End: 2023-09-12

## 2023-09-12 NOTE — TELEPHONE ENCOUNTER
He needs to space them out. Flu and Covid first.  Wait 6 weeks and then get pneumonia and tdap.  Ramy Ayala

## 2023-09-12 NOTE — TELEPHONE ENCOUNTER
----- Message from Tyron [place] sent at 9/12/2023 12:08 PM EDT -----  Subject: Message to Provider    QUESTIONS  Information for Provider? Pt states that he received several notifications   for preventative care in [employer]. He is particularly interested in   scheduling a flu shot, pneumonia vaccine, and COVID-19 booster, but also   received notifications for TDAPand AAA screening. He is unsure what should   be done first and if they can be done together. He said he missed a call   from the practice yesterday, and practice is currently closed. Out of town   next week. Please contact him to advise, OK to leave detailed message if   needed.   ---------------------------------------------------------------------------  --------------  Ousmane BOND  [phone]; OK to leave message on [employer],OK to respond with electronic   message via [employer] portal (only for patients who have registered [employer]   account)  ---------------------------------------------------------------------------  --------------  SCRIPT ANSWERS  undefined

## 2023-09-29 ENCOUNTER — TELEPHONE (OUTPATIENT)
Age: 68
End: 2023-09-29

## 2023-09-29 NOTE — TELEPHONE ENCOUNTER
Patient called and stated that he got labs done on 9/28  hematocrit 46.6  Phleb was done 450cc was taken out  Platelets 998  White blood cell 5.4  Blood pressure was second number was 90  He stated that since that appointment he is not feeling well now  #[phone]

## 2023-09-29 NOTE — TELEPHONE ENCOUNTER
Pineda Vargas at [facility]  [phone]    09/29/23- Patient called to let Lena/Dr. Boo know of recent lab results from the [place], and that a phlebotomy was done. Stated he's been compliant with Hydrea 2 tabs daily. He's planning to have labs repeated again in another month. Let him know we appreciate the update.

## 2023-10-08 DIAGNOSIS — D45 POLYCYTHEMIA VERA (HCC): ICD-10-CM

## 2023-10-09 RX ORDER — HYDROXYUREA 500 MG/1
500 CAPSULE ORAL 2 TIMES DAILY
Qty: 180 CAPSULE | Refills: 1 | Status: SHIPPED | OUTPATIENT
Start: 2023-10-09

## 2023-10-15 RX ORDER — ASPIRIN 81 MG/81MG
1 CAPSULE ORAL DAILY
Qty: 90 CAPSULE | Refills: 1 | Status: SHIPPED | OUTPATIENT
Start: 2023-10-15

## 2023-10-20 SDOH — ECONOMIC STABILITY: FOOD INSECURITY: WITHIN THE PAST 12 MONTHS, YOU WORRIED THAT YOUR FOOD WOULD RUN OUT BEFORE YOU GOT MONEY TO BUY MORE.: NEVER TRUE

## 2023-10-20 SDOH — ECONOMIC STABILITY: TRANSPORTATION INSECURITY
IN THE PAST 12 MONTHS, HAS LACK OF TRANSPORTATION KEPT YOU FROM MEETINGS, WORK, OR FROM GETTING THINGS NEEDED FOR DAILY LIVING?: NO

## 2023-10-20 SDOH — ECONOMIC STABILITY: INCOME INSECURITY: HOW HARD IS IT FOR YOU TO PAY FOR THE VERY BASICS LIKE FOOD, HOUSING, MEDICAL CARE, AND HEATING?: NOT HARD AT ALL

## 2023-10-20 SDOH — ECONOMIC STABILITY: FOOD INSECURITY: WITHIN THE PAST 12 MONTHS, THE FOOD YOU BOUGHT JUST DIDN'T LAST AND YOU DIDN'T HAVE MONEY TO GET MORE.: NEVER TRUE

## 2023-10-20 SDOH — ECONOMIC STABILITY: HOUSING INSECURITY
IN THE LAST 12 MONTHS, WAS THERE A TIME WHEN YOU DID NOT HAVE A STEADY PLACE TO SLEEP OR SLEPT IN A SHELTER (INCLUDING NOW)?: NO

## 2023-10-23 ENCOUNTER — TELEPHONE (OUTPATIENT)
Age: 68
End: 2023-10-23

## 2023-10-25 RX ORDER — CEFDINIR 300 MG/1
300 CAPSULE ORAL 2 TIMES DAILY
Qty: 20 CAPSULE | Refills: 0 | Status: SHIPPED | OUTPATIENT
Start: 2023-10-25 | End: 2023-11-04

## 2023-11-14 ENCOUNTER — OFFICE VISIT (OUTPATIENT)
Age: 68
End: 2023-11-14
Payer: COMMERCIAL

## 2023-11-14 VITALS
RESPIRATION RATE: 20 BRPM | SYSTOLIC BLOOD PRESSURE: 144 MMHG | OXYGEN SATURATION: 98 % | HEIGHT: 70 IN | BODY MASS INDEX: 27.2 KG/M2 | WEIGHT: 190 LBS | DIASTOLIC BLOOD PRESSURE: 81 MMHG | TEMPERATURE: 98.8 F | HEART RATE: 66 BPM

## 2023-11-14 DIAGNOSIS — L71.9 ROSACEA: ICD-10-CM

## 2023-11-14 DIAGNOSIS — B00.9 HSV (HERPES SIMPLEX VIRUS) INFECTION: Primary | ICD-10-CM

## 2023-11-14 PROCEDURE — 99213 OFFICE O/P EST LOW 20 MIN: CPT | Performed by: NURSE PRACTITIONER

## 2023-11-14 PROCEDURE — 1123F ACP DISCUSS/DSCN MKR DOCD: CPT | Performed by: NURSE PRACTITIONER

## 2023-11-14 PROCEDURE — 3079F DIAST BP 80-89 MM HG: CPT | Performed by: NURSE PRACTITIONER

## 2023-11-14 PROCEDURE — 3077F SYST BP >= 140 MM HG: CPT | Performed by: NURSE PRACTITIONER

## 2023-11-14 RX ORDER — VALACYCLOVIR HYDROCHLORIDE 500 MG/1
500 TABLET, FILM COATED ORAL DAILY
Qty: 90 TABLET | Refills: 3 | Status: SHIPPED | OUTPATIENT
Start: 2023-11-14 | End: 2024-02-12

## 2023-11-14 RX ORDER — DOXYCYCLINE HYCLATE 100 MG
100 TABLET ORAL 2 TIMES DAILY
Qty: 20 TABLET | Refills: 0 | Status: SHIPPED | OUTPATIENT
Start: 2023-11-14 | End: 2023-11-24

## 2023-11-14 SDOH — ECONOMIC STABILITY: INCOME INSECURITY: HOW HARD IS IT FOR YOU TO PAY FOR THE VERY BASICS LIKE FOOD, HOUSING, MEDICAL CARE, AND HEATING?: NOT HARD AT ALL

## 2023-11-14 SDOH — ECONOMIC STABILITY: FOOD INSECURITY: WITHIN THE PAST 12 MONTHS, THE FOOD YOU BOUGHT JUST DIDN'T LAST AND YOU DIDN'T HAVE MONEY TO GET MORE.: NEVER TRUE

## 2023-11-14 SDOH — ECONOMIC STABILITY: FOOD INSECURITY: WITHIN THE PAST 12 MONTHS, YOU WORRIED THAT YOUR FOOD WOULD RUN OUT BEFORE YOU GOT MONEY TO BUY MORE.: NEVER TRUE

## 2023-11-14 ASSESSMENT — COLUMBIA-SUICIDE SEVERITY RATING SCALE - C-SSRS
3. HAVE YOU BEEN THINKING ABOUT HOW YOU MIGHT KILL YOURSELF?: NO
7. DID THIS OCCUR IN THE LAST THREE MONTHS: NO
3. HAVE YOU BEEN THINKING ABOUT HOW YOU MIGHT KILL YOURSELF?: NO
4. HAVE YOU HAD THESE THOUGHTS AND HAD SOME INTENTION OF ACTING ON THEM?: NO
5. HAVE YOU STARTED TO WORK OUT OR WORKED OUT THE DETAILS OF HOW TO KILL YOURSELF? DO YOU INTEND TO CARRY OUT THIS PLAN?: NO
5. HAVE YOU STARTED TO WORK OUT OR WORKED OUT THE DETAILS OF HOW TO KILL YOURSELF? DO YOU INTEND TO CARRY OUT THIS PLAN?: NO
4. HAVE YOU HAD THESE THOUGHTS AND HAD SOME INTENTION OF ACTING ON THEM?: NO
7. DID THIS OCCUR IN THE LAST THREE MONTHS: NO

## 2023-11-14 ASSESSMENT — PATIENT HEALTH QUESTIONNAIRE - PHQ9
SUM OF ALL RESPONSES TO PHQ QUESTIONS 1-9: 0
3. TROUBLE FALLING OR STAYING ASLEEP: 0
SUM OF ALL RESPONSES TO PHQ QUESTIONS 1-9: 0
10. IF YOU CHECKED OFF ANY PROBLEMS, HOW DIFFICULT HAVE THESE PROBLEMS MADE IT FOR YOU TO DO YOUR WORK, TAKE CARE OF THINGS AT HOME, OR GET ALONG WITH OTHER PEOPLE: 0
7. TROUBLE CONCENTRATING ON THINGS, SUCH AS READING THE NEWSPAPER OR WATCHING TELEVISION: 0
SUM OF ALL RESPONSES TO PHQ QUESTIONS 1-9: 0
8. MOVING OR SPEAKING SO SLOWLY THAT OTHER PEOPLE COULD HAVE NOTICED. OR THE OPPOSITE, BEING SO FIGETY OR RESTLESS THAT YOU HAVE BEEN MOVING AROUND A LOT MORE THAN USUAL: 0
SUM OF ALL RESPONSES TO PHQ9 QUESTIONS 1 & 2: 0
3. TROUBLE FALLING OR STAYING ASLEEP: 0
2. FEELING DOWN, DEPRESSED OR HOPELESS: 0
10. IF YOU CHECKED OFF ANY PROBLEMS, HOW DIFFICULT HAVE THESE PROBLEMS MADE IT FOR YOU TO DO YOUR WORK, TAKE CARE OF THINGS AT HOME, OR GET ALONG WITH OTHER PEOPLE: 0
SUM OF ALL RESPONSES TO PHQ QUESTIONS 1-9: 0
9. THOUGHTS THAT YOU WOULD BE BETTER OFF DEAD, OR OF HURTING YOURSELF: 0
5. POOR APPETITE OR OVEREATING: 0
SUM OF ALL RESPONSES TO PHQ9 QUESTIONS 1 & 2: 0
DEPRESSION UNABLE TO ASSESS: PT REFUSES
SUM OF ALL RESPONSES TO PHQ QUESTIONS 1-9: 0
DEPRESSION UNABLE TO ASSESS: PT REFUSES
4. FEELING TIRED OR HAVING LITTLE ENERGY: 0
7. TROUBLE CONCENTRATING ON THINGS, SUCH AS READING THE NEWSPAPER OR WATCHING TELEVISION: 0
5. POOR APPETITE OR OVEREATING: 0
9. THOUGHTS THAT YOU WOULD BE BETTER OFF DEAD, OR OF HURTING YOURSELF: 0
6. FEELING BAD ABOUT YOURSELF - OR THAT YOU ARE A FAILURE OR HAVE LET YOURSELF OR YOUR FAMILY DOWN: 0
4. FEELING TIRED OR HAVING LITTLE ENERGY: 0
1. LITTLE INTEREST OR PLEASURE IN DOING THINGS: 0
SUM OF ALL RESPONSES TO PHQ QUESTIONS 1-9: 0
SUM OF ALL RESPONSES TO PHQ QUESTIONS 1-9: 0
8. MOVING OR SPEAKING SO SLOWLY THAT OTHER PEOPLE COULD HAVE NOTICED. OR THE OPPOSITE, BEING SO FIGETY OR RESTLESS THAT YOU HAVE BEEN MOVING AROUND A LOT MORE THAN USUAL: 0
SUM OF ALL RESPONSES TO PHQ QUESTIONS 1-9: 0
1. LITTLE INTEREST OR PLEASURE IN DOING THINGS: 0
2. FEELING DOWN, DEPRESSED OR HOPELESS: 0

## 2023-11-14 NOTE — PROGRESS NOTES
Chief Complaint   Patient presents with    Injections     Needs tdap  States didn't do COVID in Sept 23    Rash     Rash around nose & around eyes off/on since May 23 hurts & gets sore ears,cheeks & nose. States Valtrex helps     1. Have you been to the ER, urgent care clinic since your last visit? Hospitalized since your last visit? No    2. Have you seen or consulted any other health care providers outside of the [address] [place] [address] Avenue since your last visit? Include any pap smears or colon screening.  No

## 2023-11-15 DIAGNOSIS — D45 POLYCYTHEMIA VERA (HCC): ICD-10-CM

## 2023-11-16 LAB
BASOPHILS # BLD: 0.1 K/UL (ref 0–0.1)
BASOPHILS NFR BLD: 1 % (ref 0–1)
DIFFERENTIAL METHOD BLD: ABNORMAL
EOSINOPHIL # BLD: 0.1 K/UL (ref 0–0.4)
EOSINOPHIL NFR BLD: 2 % (ref 0–7)
ERYTHROCYTE [DISTWIDTH] IN BLOOD BY AUTOMATED COUNT: 21.2 % (ref 11.5–14.5)
HCT VFR BLD AUTO: 41.4 % (ref 36.6–50.3)
HGB BLD-MCNC: 11.9 G/DL (ref 12.1–17)
IMM GRANULOCYTES # BLD AUTO: 0 K/UL (ref 0–0.04)
IMM GRANULOCYTES NFR BLD AUTO: 0 % (ref 0–0.5)
LYMPHOCYTES # BLD: 2 K/UL (ref 0.8–3.5)
LYMPHOCYTES NFR BLD: 39 % (ref 12–49)
MCH RBC QN AUTO: 25.5 PG (ref 26–34)
MCHC RBC AUTO-ENTMCNC: 28.7 G/DL (ref 30–36.5)
MCV RBC AUTO: 88.8 FL (ref 80–99)
MONOCYTES # BLD: 0.4 K/UL (ref 0–1)
MONOCYTES NFR BLD: 8 % (ref 5–13)
NEUTS SEG # BLD: 2.5 K/UL (ref 1.8–8)
NEUTS SEG NFR BLD: 50 % (ref 32–75)
NRBC # BLD: 0 K/UL (ref 0–0.01)
NRBC BLD-RTO: 0 PER 100 WBC
PLATELET # BLD AUTO: 221 K/UL (ref 150–400)
PMV BLD AUTO: 8.9 FL (ref 8.9–12.9)
RBC # BLD AUTO: 4.66 M/UL (ref 4.1–5.7)
RBC MORPH BLD: ABNORMAL
WBC # BLD AUTO: 5.1 K/UL (ref 4.1–11.1)

## 2023-11-26 DIAGNOSIS — D45 POLYCYTHEMIA VERA (HCC): ICD-10-CM

## 2023-11-27 RX ORDER — HYDROXYUREA 500 MG/1
1000 CAPSULE ORAL DAILY
Qty: 180 CAPSULE | Refills: 1 | Status: SHIPPED | OUTPATIENT
Start: 2023-11-27

## 2023-12-06 DIAGNOSIS — D45 POLYCYTHEMIA VERA (HCC): ICD-10-CM

## 2023-12-07 LAB
ALBUMIN SERPL-MCNC: 4.1 G/DL (ref 3.5–5)
ALBUMIN/GLOB SERPL: 1.3 (ref 1.1–2.2)
ALP SERPL-CCNC: 81 U/L (ref 45–117)
ALT SERPL-CCNC: 24 U/L (ref 12–78)
ANION GAP SERPL CALC-SCNC: 3 MMOL/L (ref 5–15)
AST SERPL-CCNC: 14 U/L (ref 15–37)
BASOPHILS # BLD: 0.1 K/UL (ref 0–0.1)
BASOPHILS NFR BLD: 1 % (ref 0–1)
BILIRUB SERPL-MCNC: 0.7 MG/DL (ref 0.2–1)
BUN SERPL-MCNC: 19 MG/DL (ref 6–20)
BUN/CREAT SERPL: 20 (ref 12–20)
CALCIUM SERPL-MCNC: 8.6 MG/DL (ref 8.5–10.1)
CHLORIDE SERPL-SCNC: 107 MMOL/L (ref 97–108)
CO2 SERPL-SCNC: 30 MMOL/L (ref 21–32)
CREAT SERPL-MCNC: 0.97 MG/DL (ref 0.7–1.3)
DIFFERENTIAL METHOD BLD: ABNORMAL
EOSINOPHIL # BLD: 0.1 K/UL (ref 0–0.4)
EOSINOPHIL NFR BLD: 2 % (ref 0–7)
ERYTHROCYTE [DISTWIDTH] IN BLOOD BY AUTOMATED COUNT: 23.6 % (ref 11.5–14.5)
GLOBULIN SER CALC-MCNC: 3.1 G/DL (ref 2–4)
GLUCOSE SERPL-MCNC: 150 MG/DL (ref 65–100)
HCT VFR BLD AUTO: 43.7 % (ref 36.6–50.3)
HGB BLD-MCNC: 13 G/DL (ref 12.1–17)
IMM GRANULOCYTES # BLD AUTO: 0 K/UL (ref 0–0.04)
IMM GRANULOCYTES NFR BLD AUTO: 0 % (ref 0–0.5)
LYMPHOCYTES # BLD: 1.9 K/UL (ref 0.8–3.5)
LYMPHOCYTES NFR BLD: 38 % (ref 12–49)
MCH RBC QN AUTO: 28 PG (ref 26–34)
MCHC RBC AUTO-ENTMCNC: 29.7 G/DL (ref 30–36.5)
MCV RBC AUTO: 94 FL (ref 80–99)
MONOCYTES # BLD: 0.3 K/UL (ref 0–1)
MONOCYTES NFR BLD: 6 % (ref 5–13)
NEUTS SEG # BLD: 2.5 K/UL (ref 1.8–8)
NEUTS SEG NFR BLD: 53 % (ref 32–75)
NRBC # BLD: 0.03 K/UL (ref 0–0.01)
NRBC BLD-RTO: 0.6 PER 100 WBC
PLATELET # BLD AUTO: 264 K/UL (ref 150–400)
PMV BLD AUTO: 8.9 FL (ref 8.9–12.9)
POTASSIUM SERPL-SCNC: 4.3 MMOL/L (ref 3.5–5.1)
PROT SERPL-MCNC: 7.2 G/DL (ref 6.4–8.2)
RBC # BLD AUTO: 4.65 M/UL (ref 4.1–5.7)
RBC MORPH BLD: ABNORMAL
SODIUM SERPL-SCNC: 140 MMOL/L (ref 136–145)
WBC # BLD AUTO: 4.9 K/UL (ref 4.1–11.1)

## 2023-12-08 ENCOUNTER — OFFICE VISIT (OUTPATIENT)
Age: 68
End: 2023-12-08
Payer: COMMERCIAL

## 2023-12-08 ENCOUNTER — TELEPHONE (OUTPATIENT)
Age: 68
End: 2023-12-08

## 2023-12-08 VITALS
HEART RATE: 78 BPM | WEIGHT: 193 LBS | HEIGHT: 70 IN | OXYGEN SATURATION: 96 % | BODY MASS INDEX: 27.63 KG/M2 | SYSTOLIC BLOOD PRESSURE: 199 MMHG | TEMPERATURE: 97.4 F | RESPIRATION RATE: 20 BRPM | DIASTOLIC BLOOD PRESSURE: 87 MMHG

## 2023-12-08 DIAGNOSIS — D45 POLYCYTHEMIA VERA (HCC): Primary | ICD-10-CM

## 2023-12-08 PROCEDURE — 3079F DIAST BP 80-89 MM HG: CPT | Performed by: INTERNAL MEDICINE

## 2023-12-08 PROCEDURE — 3077F SYST BP >= 140 MM HG: CPT | Performed by: INTERNAL MEDICINE

## 2023-12-08 PROCEDURE — 99214 OFFICE O/P EST MOD 30 MIN: CPT | Performed by: INTERNAL MEDICINE

## 2023-12-08 PROCEDURE — 1123F ACP DISCUSS/DSCN MKR DOCD: CPT | Performed by: INTERNAL MEDICINE

## 2023-12-08 NOTE — TELEPHONE ENCOUNTER
PT called in to let the team know that the date he increased the dose of his hydrea was on 10-18-23.     # [phone]

## 2023-12-08 NOTE — PROGRESS NOTES
Camron Moser is a 79 y.o. male follow up for polycythemia vera. 1. Have you been to the ER, urgent care clinic since your last visit? Hospitalized since your last visit?no    2. Have you seen or consulted any other health care providers outside of the [address] [place] [address] since your last visit? Include any pap smears or colon screening.  no

## 2023-12-13 RX ORDER — TAMSULOSIN HYDROCHLORIDE 0.4 MG/1
CAPSULE ORAL DAILY
Qty: 90 CAPSULE | Refills: 3 | Status: SHIPPED | OUTPATIENT
Start: 2023-12-13

## 2024-01-08 RX ORDER — DOXYCYCLINE HYCLATE 100 MG
100 TABLET ORAL 2 TIMES DAILY
Qty: 20 TABLET | Refills: 0 | Status: SHIPPED | OUTPATIENT
Start: 2024-01-08 | End: 2024-01-18

## 2024-01-10 ENCOUNTER — TELEPHONE (OUTPATIENT)
Age: 69
End: 2024-01-10

## 2024-01-10 NOTE — TELEPHONE ENCOUNTER
[facility] [place] at [facility]  [phone]    01/10/24- [facility].  He noted WBC 2.9, Plt 97, Hct 43.  Stated he's been taking Hydrea 1500 mg BIW and 1000 mg all other days, for the last few weeks.  He made this change sometime after his December appointment.  Patient would like to have labs repeated in a couple days and see if his Hydrea dose needs to be adjusted.     Patient stated he received the RSV vaccine last Friday and is still experiencing arm pain and warmth.  He's wondering if labs could have been altered from an immune response.    He's also restarted doxycycline daily for the rash on his face.    Discussed with Dr. Boo, informed patient that he can have labs repeated at the lab next to our office.  Encouraged him to avoid making dose adjustments to Hydrea, but if he does to please notify our office so we can document changes.  He verbalized understanding and was appreciative of the call.

## 2024-01-12 ENCOUNTER — TELEPHONE (OUTPATIENT)
Age: 69
End: 2024-01-12

## 2024-01-12 DIAGNOSIS — D45 POLYCYTHEMIA VERA (HCC): ICD-10-CM

## 2024-01-12 LAB
BASOPHILS # BLD: 0 K/UL (ref 0–0.1)
BASOPHILS NFR BLD: 0 % (ref 0–1)
DIFFERENTIAL METHOD BLD: ABNORMAL
EOSINOPHIL # BLD: 0.1 K/UL (ref 0–0.4)
EOSINOPHIL NFR BLD: 2 % (ref 0–7)
ERYTHROCYTE [DISTWIDTH] IN BLOOD BY AUTOMATED COUNT: 19.9 % (ref 11.5–14.5)
HCT VFR BLD AUTO: 45.5 % (ref 36.6–50.3)
HGB BLD-MCNC: 13.8 G/DL (ref 12.1–17)
IMM GRANULOCYTES # BLD AUTO: 0 K/UL
IMM GRANULOCYTES NFR BLD AUTO: 0 %
LYMPHOCYTES # BLD: 1.8 K/UL (ref 0.8–3.5)
LYMPHOCYTES NFR BLD: 39 % (ref 12–49)
MCH RBC QN AUTO: 31.2 PG (ref 26–34)
MCHC RBC AUTO-ENTMCNC: 30.3 G/DL (ref 30–36.5)
MCV RBC AUTO: 102.9 FL (ref 80–99)
MONOCYTES # BLD: 0.3 K/UL (ref 0–1)
MONOCYTES NFR BLD: 7 % (ref 5–13)
NEUTS SEG # BLD: 2.4 K/UL (ref 1.8–8)
NEUTS SEG NFR BLD: 52 % (ref 32–75)
NRBC # BLD: 0 K/UL (ref 0–0.01)
NRBC BLD-RTO: 0 PER 100 WBC
PLATELET # BLD AUTO: 101 K/UL (ref 150–400)
PMV BLD AUTO: 9.9 FL (ref 8.9–12.9)
RBC # BLD AUTO: 4.42 M/UL (ref 4.1–5.7)
RBC MORPH BLD: ABNORMAL
RBC MORPH BLD: ABNORMAL
WBC # BLD AUTO: 4.6 K/UL (ref 4.1–11.1)
WBC MORPH BLD: ABNORMAL

## 2024-01-12 NOTE — TELEPHONE ENCOUNTER
[facility] [place] at [facility]  [phone]    Labs reviewed.  PLT down to 101k.  HCT up to 45.5.    I recommend he reduce his Hydrea dose to 1000mg daily, which hopefully will allow his platelets to improve.    I recommend he have therapeutic phlebotomy sometime soon for his HCT >45.    Repeat labs before next appointment.

## 2024-01-12 NOTE — TELEPHONE ENCOUNTER
[facility] [place] at [facility]  [phone]      01/12/24- Attempted to call pt to discuss results/recommendations below per MD. VM left for patient.      Labs reviewed.  PLT down to 101k.  HCT up to 45.5.     I recommend he reduce his Hydrea dose to 1000mg daily, which hopefully will allow his platelets to improve.     I recommend he have therapeutic phlebotomy sometime soon for his HCT >45.     Repeat labs before next appointment

## 2024-01-18 ENCOUNTER — TELEPHONE (OUTPATIENT)
Age: 69
End: 2024-01-18

## 2024-01-18 NOTE — TELEPHONE ENCOUNTER
Pt called for a refill on Hydroxyurea - pharmacy says its too soon to refill but pt is now taking it 2 days a week so he's running out. [facility] today    # [phone]

## 2024-01-18 NOTE — TELEPHONE ENCOUNTER
Mohsen [facility] [place] at [facility]  [phone]    01/18/24- Patient stated [facility], WBC 5.2, plts 195, Hct 44.7 so they did not phlebotomize.  Patient stated he would prefer to follow our lab results and schedule phlebotomy for tomorrow if possible.  Will forward message to Lori CANTU to get this set up.    Patient also needs a refill of Hydrea due to recent dose adjustments.  Call placed to [facility], spoke to pharmacy tech to okay early refill.  Reviewed directions to take 2 caps by mouth daily.  They will submit override and refill.

## 2024-01-24 ENCOUNTER — HOSPITAL ENCOUNTER (OUTPATIENT)
Facility: HOSPITAL | Age: 69
Setting detail: INFUSION SERIES
Discharge: HOME | End: 2024-01-24
Payer: COMMERCIAL

## 2024-01-24 VITALS
HEART RATE: 62 BPM | SYSTOLIC BLOOD PRESSURE: 155 MMHG | TEMPERATURE: 97.8 F | OXYGEN SATURATION: 99 % | DIASTOLIC BLOOD PRESSURE: 72 MMHG | RESPIRATION RATE: 17 BRPM

## 2024-01-24 DIAGNOSIS — D45 POLYCYTHEMIA VERA (HCC): Primary | ICD-10-CM

## 2024-01-24 PROCEDURE — 99195 PHLEBOTOMY: CPT

## 2024-01-24 RX ORDER — DOXYCYCLINE HYCLATE 20 MG
20 TABLET ORAL 2 TIMES DAILY
COMMUNITY

## 2024-01-24 RX ORDER — 0.9 % SODIUM CHLORIDE 0.9 %
250 INTRAVENOUS SOLUTION INTRAVENOUS ONCE
OUTPATIENT
Start: 2024-01-24 | End: 2024-01-24

## 2024-01-24 ASSESSMENT — PAIN SCALES - GENERAL: PAINLEVEL_OUTOF10: 0

## 2024-01-24 NOTE — PROGRESS NOTES
[facility] Progress Note    Date: 2024    Name: Frederick Chang    MRN: [id number]         : 1955      1130:  Pt arrived ambulatory and in no distress for therapeutic phlebotomy.      Labs drawn on 24 and noted to be within treatment parameters. HGB is 13.8 and HCT is 45.5%.    1159:  Left AC vein accessed using 18g therapeutic phlebotomy set.  500 ml whole blood removed without difficulty, pt tolerated well.  Procedure completed at 1220, manual pressure applied until hemostasis noted; wrapped with coban.  Nourishment provided.      Patient Vitals for the past 12 hrs:   Temp Pulse Resp BP SpO2   24 1222 -- -- -- (!) 155/72 --   24 1147 -- -- -- (!) 190/60 --   24 1145 97.8 °F (36.6 °C) 62 17 (!) 195/86 99 %         1228:  VS stable, pt denies lightheadedness/dizziness.  Pt D/Cd from [facility] ambulatory and in no distress.     Future Appointments   Date Time Provider Department Center   3/8/2024 11:30 AM Ryan Boo MD [facility]       Lena Buenrostro RN  2024

## 2024-02-05 RX ORDER — LISINOPRIL 40 MG/1
40 TABLET ORAL DAILY
Qty: 90 TABLET | Refills: 3 | Status: SHIPPED | OUTPATIENT
Start: 2024-02-05

## 2024-02-29 NOTE — PROGRESS NOTES
[facility] [place] at [facility]  [address] [address], [address] [facility] [address]  W: [phone]  F: [phone]      Reason for Visit:   Frederick Chang is a 68 y.o. male who is seen today for evaluation of polycythemia vera.    Hematology/Oncology Treatment History:   Diagnosed in 2012 at the [facility]  Bone marrow biopsy 4/1/2016: Hypercellular marrow with erythroid predominant trilineage hematopoiesis including atypical megakaryocytic hyperplasia and <5% blasts. Moderate reticulin fibrosis.  Stainable iron minimally present. MPN FISH panel NEGATIVE   Polycythemia Vera  Hydrea initiated 11/2022  Increased to 1000mg on 3/10/2023  Patient reduced to alternating 1000mg/500mg around 3/21/2023  Increased back to 1000mg on 4/14/2023  Increased to 1500mg TIW, 1000mg other days, starting 10/2023  Reduced to 1000mg 1/2024    History of Present Illness:   He remains on hydrea, reduced back to 1000mg in January due to thrombocytopenia.  He had therapeutic phlebotomy here on 1/24/2024.      He reports feeling \"much better.\"  Still with his chronic fatigue, unchanged, predated his Hydrea.  He is thinking about starting some regular exercise and I encouraged this.      Review of systems was obtained and pertinent findings reviewed above. Past medical history, social history, family history, medications, and allergies are located in the electronic medical record.    Physical Exam:   Visit Vitals  BP (!) 175/77 (Site: Right Upper Arm, Position: Sitting, Cuff Size: Small Adult)   Pulse 54   Temp 97.2 °F (36.2 °C) (Temporal)   Resp 16   Ht 1.778 m (5' 10\")   Wt 90.1 kg (198 lb 9.6 oz)   SpO2 99%   BMI 28.50 kg/m²       General: no distress  Respiratory: normal respiratory effort  CV: no peripheral edema  Skin: no rashes; no ecchymoses; no petechiae      Results:     Lab Results   Component Value Date    WBC 5.3 03/05/2024    HGB 13.6 03/05/2024    HCT 46.2 03/05/2024     03/05/2024    MCV

## 2024-03-05 DIAGNOSIS — D45 POLYCYTHEMIA VERA (HCC): ICD-10-CM

## 2024-03-06 LAB
ALBUMIN SERPL-MCNC: 4.2 G/DL (ref 3.5–5)
ALBUMIN/GLOB SERPL: 1.5 (ref 1.1–2.2)
ALP SERPL-CCNC: 85 U/L (ref 45–117)
ALT SERPL-CCNC: 25 U/L (ref 12–78)
ANION GAP SERPL CALC-SCNC: 4 MMOL/L (ref 5–15)
AST SERPL-CCNC: 14 U/L (ref 15–37)
BASOPHILS # BLD: 0 K/UL (ref 0–0.1)
BASOPHILS NFR BLD: 1 % (ref 0–1)
BILIRUB DIRECT SERPL-MCNC: 0.2 MG/DL (ref 0–0.2)
BILIRUB SERPL-MCNC: 0.7 MG/DL (ref 0.2–1)
BUN SERPL-MCNC: 16 MG/DL (ref 6–20)
BUN/CREAT SERPL: 15 (ref 12–20)
CALCIUM SERPL-MCNC: 8.8 MG/DL (ref 8.5–10.1)
CHLORIDE SERPL-SCNC: 107 MMOL/L (ref 97–108)
CO2 SERPL-SCNC: 30 MMOL/L (ref 21–32)
CREAT SERPL-MCNC: 1.09 MG/DL (ref 0.7–1.3)
DIFFERENTIAL METHOD BLD: ABNORMAL
EOSINOPHIL # BLD: 0.1 K/UL (ref 0–0.4)
EOSINOPHIL NFR BLD: 2 % (ref 0–7)
ERYTHROCYTE [DISTWIDTH] IN BLOOD BY AUTOMATED COUNT: 14.3 % (ref 11.5–14.5)
GLOBULIN SER CALC-MCNC: 2.8 G/DL (ref 2–4)
GLUCOSE SERPL-MCNC: 155 MG/DL (ref 65–100)
HCT VFR BLD AUTO: 46.2 % (ref 36.6–50.3)
HGB BLD-MCNC: 13.6 G/DL (ref 12.1–17)
IMM GRANULOCYTES # BLD AUTO: 0 K/UL (ref 0–0.04)
IMM GRANULOCYTES NFR BLD AUTO: 0 % (ref 0–0.5)
LYMPHOCYTES # BLD: 1.8 K/UL (ref 0.8–3.5)
LYMPHOCYTES NFR BLD: 34 % (ref 12–49)
MCH RBC QN AUTO: 30.3 PG (ref 26–34)
MCHC RBC AUTO-ENTMCNC: 29.4 G/DL (ref 30–36.5)
MCV RBC AUTO: 102.9 FL (ref 80–99)
MONOCYTES # BLD: 0.3 K/UL (ref 0–1)
MONOCYTES NFR BLD: 5 % (ref 5–13)
NEUTS SEG # BLD: 3.1 K/UL (ref 1.8–8)
NEUTS SEG NFR BLD: 58 % (ref 32–75)
NRBC # BLD: 0 K/UL (ref 0–0.01)
NRBC BLD-RTO: 0 PER 100 WBC
PLATELET # BLD AUTO: 160 K/UL (ref 150–400)
PMV BLD AUTO: 10.6 FL (ref 8.9–12.9)
POTASSIUM SERPL-SCNC: 4 MMOL/L (ref 3.5–5.1)
PROT SERPL-MCNC: 7 G/DL (ref 6.4–8.2)
RBC # BLD AUTO: 4.49 M/UL (ref 4.1–5.7)
SODIUM SERPL-SCNC: 141 MMOL/L (ref 136–145)
WBC # BLD AUTO: 5.3 K/UL (ref 4.1–11.1)

## 2024-03-07 ENCOUNTER — TELEPHONE (OUTPATIENT)
Age: 69
End: 2024-03-07

## 2024-03-07 RX ORDER — 0.9 % SODIUM CHLORIDE 0.9 %
250 INTRAVENOUS SOLUTION INTRAVENOUS ONCE
Status: CANCELLED | OUTPATIENT
Start: 2024-03-08 | End: 2024-03-08

## 2024-03-07 NOTE — TELEPHONE ENCOUNTER
Patient called and stated that he saw his labs and he thinks he will need a phlebotomy and wanted to know if we can add that after his apt for tomorrow  CB#[phone]

## 2024-03-08 ENCOUNTER — OFFICE VISIT (OUTPATIENT)
Age: 69
End: 2024-03-08

## 2024-03-08 ENCOUNTER — HOSPITAL ENCOUNTER (OUTPATIENT)
Facility: HOSPITAL | Age: 69
Setting detail: INFUSION SERIES
Discharge: HOME | End: 2024-03-08
Payer: COMMERCIAL

## 2024-03-08 VITALS
TEMPERATURE: 97.2 F | RESPIRATION RATE: 18 BRPM | SYSTOLIC BLOOD PRESSURE: 166 MMHG | OXYGEN SATURATION: 97 % | HEIGHT: 70 IN | BODY MASS INDEX: 28.5 KG/M2 | HEART RATE: 56 BPM | DIASTOLIC BLOOD PRESSURE: 95 MMHG

## 2024-03-08 VITALS
OXYGEN SATURATION: 99 % | SYSTOLIC BLOOD PRESSURE: 175 MMHG | WEIGHT: 198.6 LBS | HEIGHT: 70 IN | RESPIRATION RATE: 16 BRPM | BODY MASS INDEX: 28.43 KG/M2 | DIASTOLIC BLOOD PRESSURE: 77 MMHG | HEART RATE: 54 BPM | TEMPERATURE: 97.2 F

## 2024-03-08 DIAGNOSIS — D45 POLYCYTHEMIA VERA (HCC): Primary | ICD-10-CM

## 2024-03-08 PROCEDURE — 99195 PHLEBOTOMY: CPT

## 2024-03-08 RX ORDER — 0.9 % SODIUM CHLORIDE 0.9 %
250 INTRAVENOUS SOLUTION INTRAVENOUS ONCE
OUTPATIENT
Start: 2024-03-08 | End: 2024-03-08

## 2024-03-08 RX ORDER — 0.9 % SODIUM CHLORIDE 0.9 %
250 INTRAVENOUS SOLUTION INTRAVENOUS ONCE
Status: DISCONTINUED | OUTPATIENT
Start: 2024-03-08 | End: 2024-03-09 | Stop reason: HOSPADM

## 2024-03-08 ASSESSMENT — PAIN DESCRIPTION - LOCATION: LOCATION: COCCYX

## 2024-03-08 ASSESSMENT — PAIN DESCRIPTION - PAIN TYPE: TYPE: CHRONIC PAIN

## 2024-03-08 ASSESSMENT — PAIN DESCRIPTION - DESCRIPTORS: DESCRIPTORS: ACHING

## 2024-03-08 ASSESSMENT — PAIN SCALES - GENERAL: PAINLEVEL_OUTOF10: 3

## 2024-03-08 ASSESSMENT — PAIN DESCRIPTION - ORIENTATION: ORIENTATION: MID

## 2024-03-08 NOTE — PROGRESS NOTES
[facility] Short Visit Progress Note    Mr. Chang admitted to [facility] for Therapeutic Phlebotomy  ambulatory in stable condition. Assessment completed. No new concerns voiced. Labs drawn on 03/05/2024 and per MD ok to proceed with Therapeutic Phlebotomy today. PT seen by MD prior to [facility] appointment.     Vital Signs:  BP (!) 166/95   Pulse 56   Temp 97.2 °F (36.2 °C) (Temporal)   Resp 18   Ht 1.778 m (5' 10\")   SpO2 97%   BMI 28.50 kg/m²       Left AC accessed using 20g PIV.500 Ml whole blood removed without difficulty. Pt tolerated treatment well. Procedure started at 1325 and completed at 1340. Nourishment provided. Manual pressure applied until hemostasis noted; wrapped with coban     VS stable, pt denies lightheadedness/dizziness. Pt D/Cd from [facility] ambulatory and in no distress       Patient aware of next appointment.    Kira Strong RN  March 8, 2024    Future Appointments   Date Time Provider Department Center   6/13/2024 11:30 AM Ryan Boo MD [facility] AMB

## 2024-03-08 NOTE — PROGRESS NOTES
Chief Complaint   Patient presents with    Follow-up           Vitals:    03/08/24 1145   BP: (!) 175/77   Pulse: 54   Resp: 16   Temp: 97.2 °F (36.2 °C)   SpO2: 99%        Patient is asymptomatic , states he has \" white coat syndrome \" .     1. Have you been to the ER, urgent care clinic since your last visit?  Hospitalized since your last visit?  Yes [facility]  2. Have you seen or consulted any other health care providers outside of the [facility] since your last visit?  Include any pap smears or colon screening. No

## 2024-04-19 DIAGNOSIS — D45 POLYCYTHEMIA VERA (HCC): ICD-10-CM

## 2024-04-19 LAB
BASOPHILS # BLD: 0.1 K/UL (ref 0–0.1)
BASOPHILS NFR BLD: 1 % (ref 0–1)
DIFFERENTIAL METHOD BLD: ABNORMAL
EOSINOPHIL # BLD: 0.1 K/UL (ref 0–0.4)
EOSINOPHIL NFR BLD: 2 % (ref 0–7)
ERYTHROCYTE [DISTWIDTH] IN BLOOD BY AUTOMATED COUNT: 15.9 % (ref 11.5–14.5)
HCT VFR BLD AUTO: 44.6 % (ref 36.6–50.3)
HGB BLD-MCNC: 13.1 G/DL (ref 12.1–17)
IMM GRANULOCYTES # BLD AUTO: 0 K/UL (ref 0–0.04)
IMM GRANULOCYTES NFR BLD AUTO: 0 % (ref 0–0.5)
LYMPHOCYTES # BLD: 1.9 K/UL (ref 0.8–3.5)
LYMPHOCYTES NFR BLD: 36 % (ref 12–49)
MCH RBC QN AUTO: 28.9 PG (ref 26–34)
MCHC RBC AUTO-ENTMCNC: 29.4 G/DL (ref 30–36.5)
MCV RBC AUTO: 98.2 FL (ref 80–99)
MONOCYTES # BLD: 0.3 K/UL (ref 0–1)
MONOCYTES NFR BLD: 5 % (ref 5–13)
NEUTS SEG # BLD: 3 K/UL (ref 1.8–8)
NEUTS SEG NFR BLD: 56 % (ref 32–75)
NRBC # BLD: 0 K/UL (ref 0–0.01)
NRBC BLD-RTO: 0 PER 100 WBC
PLATELET # BLD AUTO: 155 K/UL (ref 150–400)
PMV BLD AUTO: 10.1 FL (ref 8.9–12.9)
RBC # BLD AUTO: 4.54 M/UL (ref 4.1–5.7)
WBC # BLD AUTO: 5.3 K/UL (ref 4.1–11.1)

## 2024-05-06 ENCOUNTER — TELEPHONE (OUTPATIENT)
Age: 69
End: 2024-05-06

## 2024-05-06 DIAGNOSIS — D45 POLYCYTHEMIA VERA (HCC): Primary | ICD-10-CM

## 2024-05-06 NOTE — TELEPHONE ENCOUNTER
[facility] [place] at [facility]  [phone]    05/06/24- Returned pt phone call he reported he has been having some leg spasms and migraines and he feels that his labs need to be checked. He thinks he may need another phlebotomy. Per NP advised him labs ordered and we will be in contact with results. No further questions or concerns.

## 2024-05-07 DIAGNOSIS — D45 POLYCYTHEMIA VERA (HCC): ICD-10-CM

## 2024-05-07 LAB
ALBUMIN SERPL-MCNC: 4 G/DL (ref 3.5–5)
ALBUMIN/GLOB SERPL: 1.3 (ref 1.1–2.2)
ALP SERPL-CCNC: 86 U/L (ref 45–117)
ALT SERPL-CCNC: 28 U/L (ref 12–78)
ANION GAP SERPL CALC-SCNC: 3 MMOL/L (ref 5–15)
AST SERPL-CCNC: 12 U/L (ref 15–37)
BASOPHILS # BLD: 0.1 K/UL (ref 0–0.1)
BASOPHILS NFR BLD: 1 % (ref 0–1)
BILIRUB DIRECT SERPL-MCNC: 0.2 MG/DL (ref 0–0.2)
BILIRUB SERPL-MCNC: 0.7 MG/DL (ref 0.2–1)
BUN SERPL-MCNC: 22 MG/DL (ref 6–20)
BUN/CREAT SERPL: 22 (ref 12–20)
CALCIUM SERPL-MCNC: 9.2 MG/DL (ref 8.5–10.1)
CHLORIDE SERPL-SCNC: 110 MMOL/L (ref 97–108)
CO2 SERPL-SCNC: 29 MMOL/L (ref 21–32)
CREAT SERPL-MCNC: 0.98 MG/DL (ref 0.7–1.3)
DIFFERENTIAL METHOD BLD: ABNORMAL
EOSINOPHIL # BLD: 0.1 K/UL (ref 0–0.4)
EOSINOPHIL NFR BLD: 2 % (ref 0–7)
ERYTHROCYTE [DISTWIDTH] IN BLOOD BY AUTOMATED COUNT: 16 % (ref 11.5–14.5)
GLOBULIN SER CALC-MCNC: 3 G/DL (ref 2–4)
GLUCOSE SERPL-MCNC: 122 MG/DL (ref 65–100)
HCT VFR BLD AUTO: 44.8 % (ref 36.6–50.3)
HGB BLD-MCNC: 13.5 G/DL (ref 12.1–17)
IMM GRANULOCYTES # BLD AUTO: 0 K/UL (ref 0–0.04)
IMM GRANULOCYTES NFR BLD AUTO: 0 % (ref 0–0.5)
LYMPHOCYTES # BLD: 2 K/UL (ref 0.8–3.5)
LYMPHOCYTES NFR BLD: 36 % (ref 12–49)
MCH RBC QN AUTO: 28.5 PG (ref 26–34)
MCHC RBC AUTO-ENTMCNC: 30.1 G/DL (ref 30–36.5)
MCV RBC AUTO: 94.5 FL (ref 80–99)
MONOCYTES # BLD: 0.4 K/UL (ref 0–1)
MONOCYTES NFR BLD: 7 % (ref 5–13)
NEUTS SEG # BLD: 3 K/UL (ref 1.8–8)
NEUTS SEG NFR BLD: 54 % (ref 32–75)
NRBC # BLD: 0 K/UL (ref 0–0.01)
NRBC BLD-RTO: 0 PER 100 WBC
PLATELET # BLD AUTO: 221 K/UL (ref 150–400)
PMV BLD AUTO: 8.9 FL (ref 8.9–12.9)
POTASSIUM SERPL-SCNC: 4.5 MMOL/L (ref 3.5–5.1)
PROT SERPL-MCNC: 7 G/DL (ref 6.4–8.2)
RBC # BLD AUTO: 4.74 M/UL (ref 4.1–5.7)
SODIUM SERPL-SCNC: 142 MMOL/L (ref 136–145)
WBC # BLD AUTO: 5.5 K/UL (ref 4.1–11.1)

## 2024-05-29 ENCOUNTER — TELEPHONE (OUTPATIENT)
Age: 69
End: 2024-05-29

## 2024-05-29 DIAGNOSIS — D45 POLYCYTHEMIA VERA (HCC): ICD-10-CM

## 2024-05-29 LAB
BASOPHILS # BLD: 0.1 K/UL (ref 0–0.1)
BASOPHILS NFR BLD: 1 % (ref 0–1)
DIFFERENTIAL METHOD BLD: ABNORMAL
EOSINOPHIL # BLD: 0.1 K/UL (ref 0–0.4)
EOSINOPHIL NFR BLD: 2 % (ref 0–7)
ERYTHROCYTE [DISTWIDTH] IN BLOOD BY AUTOMATED COUNT: 17.2 % (ref 11.5–14.5)
HCT VFR BLD AUTO: 45.7 % (ref 36.6–50.3)
HGB BLD-MCNC: 14.1 G/DL (ref 12.1–17)
IMM GRANULOCYTES # BLD AUTO: 0 K/UL (ref 0–0.04)
IMM GRANULOCYTES NFR BLD AUTO: 0 % (ref 0–0.5)
LYMPHOCYTES # BLD: 2.4 K/UL (ref 0.8–3.5)
LYMPHOCYTES NFR BLD: 40 % (ref 12–49)
MCH RBC QN AUTO: 29.3 PG (ref 26–34)
MCHC RBC AUTO-ENTMCNC: 30.9 G/DL (ref 30–36.5)
MCV RBC AUTO: 94.8 FL (ref 80–99)
MONOCYTES # BLD: 0.4 K/UL (ref 0–1)
MONOCYTES NFR BLD: 7 % (ref 5–13)
NEUTS SEG # BLD: 2.9 K/UL (ref 1.8–8)
NEUTS SEG NFR BLD: 50 % (ref 32–75)
NRBC # BLD: 0 K/UL (ref 0–0.01)
NRBC BLD-RTO: 0 PER 100 WBC
PLATELET # BLD AUTO: 273 K/UL (ref 150–400)
PMV BLD AUTO: 9 FL (ref 8.9–12.9)
RBC # BLD AUTO: 4.82 M/UL (ref 4.1–5.7)
WBC # BLD AUTO: 5.9 K/UL (ref 4.1–11.1)

## 2024-05-29 NOTE — TELEPHONE ENCOUNTER
Patient called and stated that he is coming in now to have his labs drawn. He stated that he has been having headaches and he would like to feel better before going to the beach on Sunday.       # [phone]

## 2024-05-29 NOTE — TELEPHONE ENCOUNTER
[facility] [place] at [facility]  [phone]    05/29/24- Detailed voicemail left for patient informing him Hct 45.7, okay to schedule phlebotomy on Friday prior to vacation.  Requested a return call to schedule.

## 2024-05-30 NOTE — PROGRESS NOTES
[facility] [place] at [facility]  [address] [address], [address] [facility] [address]  W: [phone]  F: [phone]      Reason for Visit:   Frederick Chang is a 68 y.o. male who is seen today for evaluation of polycythemia vera.    Hematology/Oncology Treatment History:   Diagnosed in 2012 at the [facility]  Bone marrow biopsy 4/1/2016: Hypercellular marrow with erythroid predominant trilineage hematopoiesis including atypical megakaryocytic hyperplasia and <5% blasts. Moderate reticulin fibrosis.  Stainable iron minimally present. MPN FISH panel NEGATIVE   Polycythemia Vera  Hydrea initiated 11/2022  Increased to 1000mg on 3/10/2023  Patient reduced to alternating 1000mg/500mg around 3/21/2023  Increased back to 1000mg on 4/14/2023  Increased to 1500mg TIW, 1000mg other days, starting 10/2023  Reduced to 1000mg 1/2024    History of Present Illness:   He required repeat phlebotomy on 5/31 for HCT of 45.7. He felt worsening symptoms at that time with increasing headaches and anxiety, which improved after treatment.    He remains on hydrea, taking 1000mg at night. He reports good compliance now.    He feels his energy may be improving gradually over time.        Review of systems was obtained and pertinent findings reviewed above. Past medical history, social history, family history, medications, and allergies are located in the electronic medical record.    Physical Exam:   Visit Vitals  BP (!) 167/73 (Site: Right Upper Arm, Position: Sitting, Cuff Size: Large Adult)   Pulse 80   Temp 97 °F (36.1 °C) (Temporal)   Resp 20   Ht 1.778 m (5' 10\")   SpO2 98%   BMI 27.69 kg/m²     General: no distress  Respiratory: normal respiratory effort  CV: no peripheral edema  Skin: no rashes; no ecchymoses; no petechiae      Results:     Lab Results   Component Value Date    WBC 5.9 05/29/2024    HGB 14.1 05/29/2024    HCT 45.7 05/29/2024     05/29/2024    MCV 94.8 05/29/2024    NEUTROABS 2.9

## 2024-05-31 ENCOUNTER — HOSPITAL ENCOUNTER (OUTPATIENT)
Facility: HOSPITAL | Age: 69
Setting detail: INFUSION SERIES
Discharge: HOME | End: 2024-05-31
Payer: COMMERCIAL

## 2024-05-31 VITALS
HEIGHT: 70 IN | HEART RATE: 66 BPM | BODY MASS INDEX: 27.63 KG/M2 | OXYGEN SATURATION: 96 % | WEIGHT: 193 LBS | SYSTOLIC BLOOD PRESSURE: 132 MMHG | TEMPERATURE: 97.2 F | RESPIRATION RATE: 18 BRPM | DIASTOLIC BLOOD PRESSURE: 63 MMHG

## 2024-05-31 DIAGNOSIS — D45 POLYCYTHEMIA VERA (HCC): Primary | ICD-10-CM

## 2024-05-31 PROCEDURE — 99195 PHLEBOTOMY: CPT

## 2024-05-31 RX ORDER — 0.9 % SODIUM CHLORIDE 0.9 %
250 INTRAVENOUS SOLUTION INTRAVENOUS ONCE
Status: CANCELLED | OUTPATIENT
Start: 2024-05-31 | End: 2024-05-31

## 2024-05-31 RX ORDER — 0.9 % SODIUM CHLORIDE 0.9 %
250 INTRAVENOUS SOLUTION INTRAVENOUS ONCE
OUTPATIENT
Start: 2024-05-31 | End: 2024-05-31

## 2024-05-31 ASSESSMENT — PAIN DESCRIPTION - LOCATION: LOCATION: NECK

## 2024-05-31 ASSESSMENT — PAIN DESCRIPTION - PAIN TYPE: TYPE: CHRONIC PAIN

## 2024-05-31 ASSESSMENT — PAIN SCALES - GENERAL: PAINLEVEL_OUTOF10: 2

## 2024-05-31 NOTE — PROGRESS NOTES
hospitals Progress Note    Date: May 31, 2024        1130: Mr. Chang arrived ambulatory and in no distress for Therapeutic Phlebotomy. Assessment was completed, no acute issues or new complaints at this time. Labs drawn on 05/29 are within parameters for treatment today.      Mr. Chang's vitals were reviewed.  Patient Vitals for the past 12 hrs:   Temp Pulse Resp BP SpO2   05/31/24 1225 -- 66 -- 132/63 --   05/31/24 1144 97.2 °F (36.2 °C) 58 18 (!) 150/70 96 %       18g PIV established to left arm without difficulty. 500mL whole blood removed from PIV per order. PIV removed, pressure applied to site until hemostasis noted and wrapped with gauze and cobain. Nourishment provided. Pt declined 15 minute observation, denies any symptoms of dizziness or light-headedness upon discharge, VS stable.      1230: Patient tolerated treatment well and was discharged from hospitals in stable condition. Patient was advised to follow up with his physician regarding future appointments at the hospitals.      Krysta Green RN  May 31, 2024

## 2024-06-13 ENCOUNTER — OFFICE VISIT (OUTPATIENT)
Age: 69
End: 2024-06-13
Payer: COMMERCIAL

## 2024-06-13 ENCOUNTER — OFFICE VISIT (OUTPATIENT)
Age: 69
End: 2024-06-13

## 2024-06-13 VITALS
RESPIRATION RATE: 20 BRPM | SYSTOLIC BLOOD PRESSURE: 125 MMHG | HEART RATE: 74 BPM | TEMPERATURE: 99 F | BODY MASS INDEX: 28.06 KG/M2 | HEIGHT: 70 IN | OXYGEN SATURATION: 98 % | WEIGHT: 196 LBS | DIASTOLIC BLOOD PRESSURE: 79 MMHG

## 2024-06-13 VITALS
OXYGEN SATURATION: 98 % | SYSTOLIC BLOOD PRESSURE: 167 MMHG | HEIGHT: 70 IN | TEMPERATURE: 97 F | HEART RATE: 80 BPM | RESPIRATION RATE: 20 BRPM | BODY MASS INDEX: 27.69 KG/M2 | DIASTOLIC BLOOD PRESSURE: 73 MMHG

## 2024-06-13 DIAGNOSIS — J34.89 SINUS PAIN: Primary | ICD-10-CM

## 2024-06-13 DIAGNOSIS — H92.01 RIGHT EAR PAIN: ICD-10-CM

## 2024-06-13 DIAGNOSIS — D45 POLYCYTHEMIA VERA (HCC): Primary | ICD-10-CM

## 2024-06-13 PROCEDURE — 99213 OFFICE O/P EST LOW 20 MIN: CPT | Performed by: PHYSICIAN ASSISTANT

## 2024-06-13 PROCEDURE — 3074F SYST BP LT 130 MM HG: CPT | Performed by: PHYSICIAN ASSISTANT

## 2024-06-13 PROCEDURE — 1123F ACP DISCUSS/DSCN MKR DOCD: CPT | Performed by: PHYSICIAN ASSISTANT

## 2024-06-13 PROCEDURE — 3078F DIAST BP <80 MM HG: CPT | Performed by: PHYSICIAN ASSISTANT

## 2024-06-13 RX ORDER — AMOXICILLIN AND CLAVULANATE POTASSIUM 875; 125 MG/1; MG/1
1 TABLET, FILM COATED ORAL 2 TIMES DAILY
Qty: 20 TABLET | Refills: 0 | Status: SHIPPED | OUTPATIENT
Start: 2024-06-13 | End: 2024-06-23

## 2024-06-13 RX ORDER — HYDROXYUREA 500 MG/1
1000 CAPSULE ORAL DAILY
Qty: 180 CAPSULE | Refills: 1 | Status: SHIPPED | OUTPATIENT
Start: 2024-06-13

## 2024-06-13 ASSESSMENT — PATIENT HEALTH QUESTIONNAIRE - PHQ9
4. FEELING TIRED OR HAVING LITTLE ENERGY: NOT AT ALL
10. IF YOU CHECKED OFF ANY PROBLEMS, HOW DIFFICULT HAVE THESE PROBLEMS MADE IT FOR YOU TO DO YOUR WORK, TAKE CARE OF THINGS AT HOME, OR GET ALONG WITH OTHER PEOPLE: NOT DIFFICULT AT ALL
6. FEELING BAD ABOUT YOURSELF - OR THAT YOU ARE A FAILURE OR HAVE LET YOURSELF OR YOUR FAMILY DOWN: NOT AT ALL
1. LITTLE INTEREST OR PLEASURE IN DOING THINGS: NOT AT ALL
SUM OF ALL RESPONSES TO PHQ QUESTIONS 1-9: 0
5. POOR APPETITE OR OVEREATING: NOT AT ALL
2. FEELING DOWN, DEPRESSED OR HOPELESS: NOT AT ALL
9. THOUGHTS THAT YOU WOULD BE BETTER OFF DEAD, OR OF HURTING YOURSELF: NOT AT ALL
8. MOVING OR SPEAKING SO SLOWLY THAT OTHER PEOPLE COULD HAVE NOTICED. OR THE OPPOSITE, BEING SO FIGETY OR RESTLESS THAT YOU HAVE BEEN MOVING AROUND A LOT MORE THAN USUAL: NOT AT ALL
SUM OF ALL RESPONSES TO PHQ QUESTIONS 1-9: 0
7. TROUBLE CONCENTRATING ON THINGS, SUCH AS READING THE NEWSPAPER OR WATCHING TELEVISION: NOT AT ALL
SUM OF ALL RESPONSES TO PHQ9 QUESTIONS 1 & 2: 0

## 2024-06-13 NOTE — PROGRESS NOTES
Frederick Chang is a 68 y.o. male , id x 2(name and ). Reviewed record, history, and  medications.      Chief Complaint   Patient presents with    URI     Patient c/o head congestion, pain in back of right head, itchy ears, x1 month, using zyrtec. No exposure, no covid test.          Vitals:    24 1321   Weight: 88.9 kg (196 lb)   Height: 1.778 m (5' 10\")             2024     1:27 PM   PHQ-9    Little interest or pleasure in doing things 0   Feeling down, depressed, or hopeless 0   Trouble falling or staying asleep, or sleeping too much 0   Feeling tired or having little energy 0   Poor appetite or overeating 0   Feeling bad about yourself - or that you are a failure or have let yourself or your family down 0   Trouble concentrating on things, such as reading the newspaper or watching television 0   Moving or speaking so slowly that other people could have noticed. Or the opposite - being so fidgety or restless that you have been moving around a lot more than usual 0   Thoughts that you would be better off dead, or of hurting yourself in some way 0   PHQ-2 Score 0   PHQ-9 Total Score 0   If you checked off any problems, how difficult have these problems made it for you to do your work, take care of things at home, or get along with other people? 0            No data to display                Social Determinants of Health     Tobacco Use: High Risk (2024)    Patient History     Smoking Tobacco Use: Every Day     Smokeless Tobacco Use: Never     Passive Exposure: Not on file   Alcohol Use: Not At Risk (2023)    AUDIT-C     Frequency of Alcohol Consumption: Never     Average Number of Drinks: Patient does not drink     Frequency of Binge Drinking: Never   Financial Resource Strain: Low Risk  (2023)    Overall Financial Resource Strain (CARDIA)     Difficulty of Paying Living Expenses: Not hard at all   Food Insecurity: Not on file (2023)   Transportation Needs: Unknown (2023)    
note.  Reviewed PmHx, RxHx, FmHx, SocHx, AllgHx and updated and dated in the chart.    Review of Systems - negative except as listed above    Objective:     Vitals:    06/13/24 1321   BP: 125/79   Site: Left Upper Arm   Position: Sitting   Cuff Size: Large Adult   Pulse: 74   Resp: 20   Temp: 99 °F (37.2 °C)   TempSrc: Oral   SpO2: 98%   Weight: 88.9 kg (196 lb)   Height: 1.778 m (5' 10\")     Physical Examination: General appearance - alert, well appearing, and in no distress  Ears -slight erythema of the right ear when compared with the left, mild bulging noted  Nose - normal and patent, no erythema, discharge or polyps, mucosal congestion, and sinus tenderness noted left maxillary  Mouth -mild erythema  Neck - supple, no significant adenopathy  Chest - clear to auscultation, no wheezes, rales or rhonchi, symmetric air entry  Heart - normal rate and regular rhythm, no murmurs noted    Assessment/ Plan:   Frederick was seen today for uri.    Diagnoses and all orders for this visit:    Sinus pain  -     amoxicillin-clavulanate (AUGMENTIN) 875-125 MG per tablet; Take 1 tablet by mouth 2 times daily for 10 days    Right ear pain  -     amoxicillin-clavulanate (AUGMENTIN) 875-125 MG per tablet; Take 1 tablet by mouth 2 times daily for 10 days    Advised patient take medication as prescribed.  Patient understands if his symptoms persist or worsen he should follow-up immediately.  Time was used for level of billing: no     No follow-up provider specified.    I have discussed the diagnosis with the patient and the intended plan as seen in the above orders.  The patient has received an after-visit summary and questions were answered concerning future plans.     Medication Side Effects and Warnings were discussed with patient: Yes  Patient Labs were reviewed and or requested: Yes  Patient Past Records were reviewed and or requested  Yes    Shawnee Almeida PA-C                        Click Here for Release of Records Request

## 2024-07-22 ENCOUNTER — OFFICE VISIT (OUTPATIENT)
Age: 69
End: 2024-07-22
Payer: COMMERCIAL

## 2024-07-22 VITALS
WEIGHT: 200 LBS | HEART RATE: 53 BPM | OXYGEN SATURATION: 98 % | HEIGHT: 70 IN | SYSTOLIC BLOOD PRESSURE: 157 MMHG | DIASTOLIC BLOOD PRESSURE: 87 MMHG | TEMPERATURE: 97.8 F | RESPIRATION RATE: 18 BRPM | BODY MASS INDEX: 28.63 KG/M2

## 2024-07-22 DIAGNOSIS — Z12.5 SCREENING PSA (PROSTATE SPECIFIC ANTIGEN): ICD-10-CM

## 2024-07-22 DIAGNOSIS — Z00.00 WELL EXAM WITHOUT ABNORMAL FINDINGS OF PATIENT 18 YEARS OF AGE OR OLDER: Primary | ICD-10-CM

## 2024-07-22 DIAGNOSIS — R30.0 DYSURIA: ICD-10-CM

## 2024-07-22 DIAGNOSIS — R73.01 IMPAIRED FASTING BLOOD SUGAR: ICD-10-CM

## 2024-07-22 DIAGNOSIS — D45 POLYCYTHEMIA VERA (HCC): ICD-10-CM

## 2024-07-22 DIAGNOSIS — I10 PRIMARY HYPERTENSION: ICD-10-CM

## 2024-07-22 DIAGNOSIS — Z00.00 WELL EXAM WITHOUT ABNORMAL FINDINGS OF PATIENT 18 YEARS OF AGE OR OLDER: ICD-10-CM

## 2024-07-22 PROCEDURE — 99214 OFFICE O/P EST MOD 30 MIN: CPT | Performed by: NURSE PRACTITIONER

## 2024-07-22 PROCEDURE — 99397 PER PM REEVAL EST PAT 65+ YR: CPT | Performed by: NURSE PRACTITIONER

## 2024-07-22 PROCEDURE — 3077F SYST BP >= 140 MM HG: CPT | Performed by: NURSE PRACTITIONER

## 2024-07-22 PROCEDURE — 3079F DIAST BP 80-89 MM HG: CPT | Performed by: NURSE PRACTITIONER

## 2024-07-22 NOTE — PROGRESS NOTES
Chief Complaint   Patient presents with    Otalgia    Eye Pain    Lab Collection      Patient stated that he has ear and eye pain that itches all the time. Stated that cipro liquid helped last time.   Patient stated that he has stomach stated that it has been in the middle of his stomach he stated that it happens when he urinates. Patient stated that he needs a referral for urinology.   Patient would like a panel of blood work. Patient would like a psa and cbc. A1C.    \"Have you been to the ER, urgent care clinic since your last visit?  Hospitalized since your last visit?\"    NO    “Have you seen or consulted any other health care providers outside of [facility] since your last visit?”    NO          Click Here for Release of Records Request

## 2024-07-23 LAB
ALBUMIN SERPL-MCNC: 4.4 G/DL (ref 3.5–5)
ALBUMIN/GLOB SERPL: 1.5 (ref 1.1–2.2)
ALP SERPL-CCNC: 81 U/L (ref 45–117)
ALT SERPL-CCNC: 32 U/L (ref 12–78)
ANION GAP SERPL CALC-SCNC: 7 MMOL/L (ref 5–15)
AST SERPL-CCNC: 15 U/L (ref 15–37)
BILIRUB SERPL-MCNC: 0.4 MG/DL (ref 0.2–1)
BUN SERPL-MCNC: 24 MG/DL (ref 6–20)
BUN/CREAT SERPL: 22 (ref 12–20)
CALCIUM SERPL-MCNC: 9.3 MG/DL (ref 8.5–10.1)
CHLORIDE SERPL-SCNC: 107 MMOL/L (ref 97–108)
CHOLEST SERPL-MCNC: 165 MG/DL
CO2 SERPL-SCNC: 28 MMOL/L (ref 21–32)
CREAT SERPL-MCNC: 1.08 MG/DL (ref 0.7–1.3)
EST. AVERAGE GLUCOSE BLD GHB EST-MCNC: 105 MG/DL
GLOBULIN SER CALC-MCNC: 3 G/DL (ref 2–4)
GLUCOSE SERPL-MCNC: 114 MG/DL (ref 65–100)
HBA1C MFR BLD: 5.3 % (ref 4–5.6)
HDLC SERPL-MCNC: 38 MG/DL
HDLC SERPL: 4.3 (ref 0–5)
LDLC SERPL CALC-MCNC: 80.6 MG/DL (ref 0–100)
POTASSIUM SERPL-SCNC: 4.4 MMOL/L (ref 3.5–5.1)
PROT SERPL-MCNC: 7.4 G/DL (ref 6.4–8.2)
PSA SERPL-MCNC: 1.1 NG/ML (ref 0.01–4)
SODIUM SERPL-SCNC: 142 MMOL/L (ref 136–145)
TRIGL SERPL-MCNC: 232 MG/DL
TSH SERPL DL<=0.05 MIU/L-ACNC: 2.47 UIU/ML (ref 0.36–3.74)
VLDLC SERPL CALC-MCNC: 46.4 MG/DL

## 2024-07-23 NOTE — PROGRESS NOTES
2024    Frederick Chang (:  1955) is a 69 y.o. male, here for a preventive medicine evaluation.  Patient stated that he has ear and eye pain that itches all the time. Stated that cipro liquid helped last time.   Patient stated that he has stomach stated that it has been in the middle of his stomach he stated that it happens when he urinates. Patient stated that he needs a referral for urinology.   Patient would like a panel of blood work. Patient would like a psa and cbc. A1C.    Subjective   Patient Active Problem List   Diagnosis    Vertigo    Fatigue    Carotid artery disease (HCC)    Anxiety and depression    CVA (cerebral vascular accident) (HCC)    Benign prostatic hyperplasia    CRISTINE (obstructive sleep apnea)    Mixed hyperlipidemia    Gallstones    Essential hypertension, benign    Depression    Stenosis of carotid artery    Polycythemia vera (HCC)    Chronic nonintractable headache    Gout    Numbness    Bipolar 1 disorder (HCC)    Ringing in ears       Review of Systems  A comprehensive review of system was obtained and negative except findings in the HPI    Prior to Visit Medications    Medication Sig Taking? Authorizing Provider   hydroxyurea (HYDREA) 500 MG chemo capsule Take 2 capsules by mouth daily Yes Ryan Boo MD   Coenzyme Q10 (CO Q 10 PO) Take by mouth Yes ProviderBee MD   FIBER PO Take by mouth Yes ProviderBee MD   NONFORMULARY peptid collagen Yes Bee Corea MD   lisinopril (PRINIVIL;ZESTRIL) 40 MG tablet Take 1 tablet by mouth daily Yes Martha Hester APRN - NP   tamsulosin (FLOMAX) 0.4 MG capsule TAKE 1 CAPSULE BY MOUTH EVERY DAY Yes Martha Hester APRN - NP   Aspirin (VAZALORE) 81 MG CAPS Take 1 capsule by mouth daily Yes Martha Hester APRN - NP   vitamin D (CHOLECALCIFEROL) 25 MCG (1000 UT) TABS tablet Take 5 tablets by mouth Daily with lunch Yes Automatic Reconciliation, Ar   Cyanocobalamin 1000 MCG SUBL Take 5,000 mcg by

## 2024-07-31 DIAGNOSIS — D45 POLYCYTHEMIA VERA (HCC): ICD-10-CM

## 2024-08-01 LAB
BASOPHILS # BLD: 0 K/UL (ref 0–0.1)
BASOPHILS NFR BLD: 1 % (ref 0–1)
DIFFERENTIAL METHOD BLD: ABNORMAL
EOSINOPHIL # BLD: 0.1 K/UL (ref 0–0.4)
EOSINOPHIL NFR BLD: 1 % (ref 0–7)
ERYTHROCYTE [DISTWIDTH] IN BLOOD BY AUTOMATED COUNT: 17.8 % (ref 11.5–14.5)
HCT VFR BLD AUTO: 43 % (ref 36.6–50.3)
HGB BLD-MCNC: 13.3 G/DL (ref 12.1–17)
IMM GRANULOCYTES # BLD AUTO: 0 K/UL (ref 0–0.04)
IMM GRANULOCYTES NFR BLD AUTO: 0 % (ref 0–0.5)
LYMPHOCYTES # BLD: 1.5 K/UL (ref 0.8–3.5)
LYMPHOCYTES NFR BLD: 30 % (ref 12–49)
MCH RBC QN AUTO: 29 PG (ref 26–34)
MCHC RBC AUTO-ENTMCNC: 30.9 G/DL (ref 30–36.5)
MCV RBC AUTO: 93.7 FL (ref 80–99)
MONOCYTES # BLD: 0.3 K/UL (ref 0–1)
MONOCYTES NFR BLD: 5 % (ref 5–13)
NEUTS SEG # BLD: 3.3 K/UL (ref 1.8–8)
NEUTS SEG NFR BLD: 63 % (ref 32–75)
NRBC # BLD: 0 K/UL (ref 0–0.01)
NRBC BLD-RTO: 0 PER 100 WBC
PLATELET # BLD AUTO: 162 K/UL (ref 150–400)
PMV BLD AUTO: 9.8 FL (ref 8.9–12.9)
RBC # BLD AUTO: 4.59 M/UL (ref 4.1–5.7)
WBC # BLD AUTO: 5.2 K/UL (ref 4.1–11.1)

## 2024-09-04 DIAGNOSIS — D45 POLYCYTHEMIA VERA (HCC): ICD-10-CM

## 2024-09-05 LAB
ALBUMIN SERPL-MCNC: 4.2 G/DL (ref 3.5–5)
ALBUMIN/GLOB SERPL: 1.4 (ref 1.1–2.2)
ALP SERPL-CCNC: 93 U/L (ref 45–117)
ALT SERPL-CCNC: 24 U/L (ref 12–78)
ANION GAP SERPL CALC-SCNC: 5 MMOL/L (ref 2–12)
AST SERPL-CCNC: 14 U/L (ref 15–37)
BASOPHILS # BLD: 0 K/UL (ref 0–0.1)
BASOPHILS NFR BLD: 1 % (ref 0–1)
BILIRUB DIRECT SERPL-MCNC: 0.2 MG/DL (ref 0–0.2)
BILIRUB SERPL-MCNC: 0.7 MG/DL (ref 0.2–1)
BUN SERPL-MCNC: 20 MG/DL (ref 6–20)
BUN/CREAT SERPL: 18 (ref 12–20)
CALCIUM SERPL-MCNC: 8.8 MG/DL (ref 8.5–10.1)
CHLORIDE SERPL-SCNC: 108 MMOL/L (ref 97–108)
CO2 SERPL-SCNC: 27 MMOL/L (ref 21–32)
COMMENT:: NORMAL
CREAT SERPL-MCNC: 1.14 MG/DL (ref 0.7–1.3)
DIFFERENTIAL METHOD BLD: ABNORMAL
EOSINOPHIL # BLD: 0.1 K/UL (ref 0–0.4)
EOSINOPHIL NFR BLD: 2 % (ref 0–7)
ERYTHROCYTE [DISTWIDTH] IN BLOOD BY AUTOMATED COUNT: 18.1 % (ref 11.5–14.5)
GLOBULIN SER CALC-MCNC: 3.1 G/DL (ref 2–4)
GLUCOSE SERPL-MCNC: 148 MG/DL (ref 65–100)
HCT VFR BLD AUTO: 48.1 % (ref 36.6–50.3)
HGB BLD-MCNC: 14.4 G/DL (ref 12.1–17)
IMM GRANULOCYTES # BLD AUTO: 0 K/UL (ref 0–0.04)
IMM GRANULOCYTES NFR BLD AUTO: 0 % (ref 0–0.5)
LYMPHOCYTES # BLD: 1.1 K/UL (ref 0.8–3.5)
LYMPHOCYTES NFR BLD: 27 % (ref 12–49)
MCH RBC QN AUTO: 29.8 PG (ref 26–34)
MCHC RBC AUTO-ENTMCNC: 29.9 G/DL (ref 30–36.5)
MCV RBC AUTO: 99.6 FL (ref 80–99)
MONOCYTES # BLD: 0.3 K/UL (ref 0–1)
MONOCYTES NFR BLD: 7 % (ref 5–13)
NEUTS SEG # BLD: 2.5 K/UL (ref 1.8–8)
NEUTS SEG NFR BLD: 63 % (ref 32–75)
NRBC # BLD: 0 K/UL (ref 0–0.01)
NRBC BLD-RTO: 0 PER 100 WBC
PLATELET # BLD AUTO: 257 K/UL (ref 150–400)
PMV BLD AUTO: 9 FL (ref 8.9–12.9)
POTASSIUM SERPL-SCNC: 4.3 MMOL/L (ref 3.5–5.1)
PROT SERPL-MCNC: 7.3 G/DL (ref 6.4–8.2)
RBC # BLD AUTO: 4.83 M/UL (ref 4.1–5.7)
SODIUM SERPL-SCNC: 140 MMOL/L (ref 136–145)
SPECIMEN HOLD: NORMAL
WBC # BLD AUTO: 4 K/UL (ref 4.1–11.1)

## 2024-09-06 ENCOUNTER — HOSPITAL ENCOUNTER (OUTPATIENT)
Facility: HOSPITAL | Age: 69
Setting detail: INFUSION SERIES
Discharge: HOME | End: 2024-09-06
Payer: COMMERCIAL

## 2024-09-06 ENCOUNTER — TELEPHONE (OUTPATIENT)
Age: 69
End: 2024-09-06

## 2024-09-06 VITALS
SYSTOLIC BLOOD PRESSURE: 149 MMHG | RESPIRATION RATE: 18 BRPM | HEART RATE: 66 BPM | DIASTOLIC BLOOD PRESSURE: 78 MMHG | TEMPERATURE: 97.5 F

## 2024-09-06 PROCEDURE — 99195 PHLEBOTOMY: CPT

## 2024-09-06 RX ORDER — 0.9 % SODIUM CHLORIDE 0.9 %
250 INTRAVENOUS SOLUTION INTRAVENOUS ONCE
OUTPATIENT
Start: 2024-09-06 | End: 2024-09-06

## 2024-09-06 ASSESSMENT — PAIN SCALES - GENERAL: PAINLEVEL_OUTOF10: 0

## 2024-09-06 NOTE — PROGRESS NOTES
[facility] Progress Note    Date: September 6, 2024      1145:  Pt arrived ambulatory and in no distress for therapeutic phlebotomy.      Labs drawn on 9/4/24. No new lab orders placed per NP.    1150:  20 gauge PIV placed to left forearm.  500 ml whole blood removed without difficulty, pt tolerated well.  Procedure completed at 1205, manual pressure applied until hemostasis noted; wrapped with coban.  Nourishment provided.      Patient Vitals for the past 12 hrs:   Temp Pulse Resp BP   09/06/24 1145 97.5 °F (36.4 °C) 66 18 (!) 149/78     1210:  VS stable, pt denies lightheadedness/dizziness.  Pt D/Cd from [facility] ambulatory and in no distress.     Future Appointments   Date Time Provider Department Center   9/13/2024 11:10 AM Ryan Boo MD [facility] BS SHANNA Willett RN  September 6, 2024

## 2024-09-06 NOTE — TELEPHONE ENCOUNTER
Spoke with patient and added him for a phlebotomy today ok by the team, he had no further questions.

## 2024-09-13 ENCOUNTER — OFFICE VISIT (OUTPATIENT)
Age: 69
End: 2024-09-13

## 2024-09-13 VITALS
TEMPERATURE: 98 F | WEIGHT: 193 LBS | BODY MASS INDEX: 27.63 KG/M2 | HEART RATE: 90 BPM | RESPIRATION RATE: 18 BRPM | OXYGEN SATURATION: 99 % | DIASTOLIC BLOOD PRESSURE: 82 MMHG | SYSTOLIC BLOOD PRESSURE: 189 MMHG | HEIGHT: 70 IN

## 2024-09-13 DIAGNOSIS — D45 POLYCYTHEMIA VERA (HCC): Primary | ICD-10-CM

## 2024-09-20 ENCOUNTER — TELEPHONE (OUTPATIENT)
Age: 69
End: 2024-09-20

## 2024-09-20 NOTE — TELEPHONE ENCOUNTER
Pt's wife Clementina came into the office and dropped off a form from the Pt's insurance that he would like to have completed and faxed to the number on the form.    Form has been placed in the basket on the provider's desk.

## 2024-09-26 ENCOUNTER — CLINICAL DOCUMENTATION (OUTPATIENT)
Age: 69
End: 2024-09-26

## 2024-10-10 ENCOUNTER — TELEPHONE (OUTPATIENT)
Age: 69
End: 2024-10-10

## 2024-10-10 DIAGNOSIS — D45 POLYCYTHEMIA VERA (HCC): ICD-10-CM

## 2024-10-10 LAB
BASOPHILS # BLD: 0 K/UL (ref 0–0.1)
BASOPHILS NFR BLD: 1 % (ref 0–1)
DIFFERENTIAL METHOD BLD: ABNORMAL
EOSINOPHIL # BLD: 0.1 K/UL (ref 0–0.4)
EOSINOPHIL NFR BLD: 2 % (ref 0–7)
ERYTHROCYTE [DISTWIDTH] IN BLOOD BY AUTOMATED COUNT: 16.4 % (ref 11.5–14.5)
HCT VFR BLD AUTO: 42.6 % (ref 36.6–50.3)
HGB BLD-MCNC: 13.1 G/DL (ref 12.1–17)
IMM GRANULOCYTES # BLD AUTO: 0 K/UL (ref 0–0.04)
IMM GRANULOCYTES NFR BLD AUTO: 0 % (ref 0–0.5)
LYMPHOCYTES # BLD: 1.6 K/UL (ref 0.8–3.5)
LYMPHOCYTES NFR BLD: 38 % (ref 12–49)
MCH RBC QN AUTO: 30 PG (ref 26–34)
MCHC RBC AUTO-ENTMCNC: 30.8 G/DL (ref 30–36.5)
MCV RBC AUTO: 97.5 FL (ref 80–99)
MONOCYTES # BLD: 0.4 K/UL (ref 0–1)
MONOCYTES NFR BLD: 10 % (ref 5–13)
NEUTS SEG # BLD: 2 K/UL (ref 1.8–8)
NEUTS SEG NFR BLD: 49 % (ref 32–75)
NRBC # BLD: 0 K/UL (ref 0–0.01)
NRBC BLD-RTO: 0 PER 100 WBC
PLATELET # BLD AUTO: 104 K/UL (ref 150–400)
PMV BLD AUTO: 8.8 FL (ref 8.9–12.9)
RBC # BLD AUTO: 4.37 M/UL (ref 4.1–5.7)
WBC # BLD AUTO: 4.2 K/UL (ref 4.1–11.1)

## 2024-10-10 NOTE — TELEPHONE ENCOUNTER
[facility] [place] at [facility]  [phone]    10/10/24- Per  reviewed lab results with pt from today. He reported he is taking hydrea 2 pills a day ( 1,000 mg) for 5 days a week and 3 pills a day two days a week (1,500 mg). Per  would like for him to cut back to 2 pills a day so 1,000 mg of hydrea daily. Recheck labs monthly. Pt verbalized understanding and denied any further questions or concerns.    4:49 PM- Pt called back to report some unusual things that have been happening lately. He is unsure if they are related previous dose changes. Wanted to let MD be aware. His neck \"  felt bruised like and very tender for 3 days but nothing happened to the area this has resolved now\". Having some tingling and nerve twitching intermittently as well. He reports when ever he changes hydrea doses his body always reacts to the change. Advised him MD will be made aware as a FYI. He should monitor symptoms if worsening to let our office know.

## 2024-11-13 DIAGNOSIS — D45 POLYCYTHEMIA VERA (HCC): ICD-10-CM

## 2024-11-13 LAB
BASOPHILS # BLD: 0.1 K/UL (ref 0–0.1)
BASOPHILS NFR BLD: 1 % (ref 0–1)
DIFFERENTIAL METHOD BLD: ABNORMAL
EOSINOPHIL # BLD: 0.1 K/UL (ref 0–0.4)
EOSINOPHIL NFR BLD: 1 % (ref 0–7)
ERYTHROCYTE [DISTWIDTH] IN BLOOD BY AUTOMATED COUNT: 16.1 % (ref 11.5–14.5)
HCT VFR BLD AUTO: 44.2 % (ref 36.6–50.3)
HGB BLD-MCNC: 13.5 G/DL (ref 12.1–17)
IMM GRANULOCYTES # BLD AUTO: 0 K/UL (ref 0–0.04)
IMM GRANULOCYTES NFR BLD AUTO: 1 % (ref 0–0.5)
LYMPHOCYTES # BLD: 1.6 K/UL (ref 0.8–3.5)
LYMPHOCYTES NFR BLD: 25 % (ref 12–49)
MCH RBC QN AUTO: 29.7 PG (ref 26–34)
MCHC RBC AUTO-ENTMCNC: 30.5 G/DL (ref 30–36.5)
MCV RBC AUTO: 97.1 FL (ref 80–99)
MONOCYTES # BLD: 0.5 K/UL (ref 0–1)
MONOCYTES NFR BLD: 8 % (ref 5–13)
NEUTS SEG # BLD: 4 K/UL (ref 1.8–8)
NEUTS SEG NFR BLD: 64 % (ref 32–75)
NRBC # BLD: 0 K/UL (ref 0–0.01)
NRBC BLD-RTO: 0 PER 100 WBC
PLATELET # BLD AUTO: 274 K/UL (ref 150–400)
PMV BLD AUTO: 8.8 FL (ref 8.9–12.9)
RBC # BLD AUTO: 4.55 M/UL (ref 4.1–5.7)
WBC # BLD AUTO: 6.3 K/UL (ref 4.1–11.1)

## 2024-12-09 ENCOUNTER — TELEPHONE (OUTPATIENT)
Age: 69
End: 2024-12-09

## 2024-12-09 DIAGNOSIS — D45 POLYCYTHEMIA VERA (HCC): ICD-10-CM

## 2024-12-09 NOTE — TELEPHONE ENCOUNTER
Andrew with patient to move his appointment from Friday 12/20 to sometime this week on Lena's schedule per the team, gave him office number to call back.

## 2024-12-10 ENCOUNTER — TELEPHONE (OUTPATIENT)
Age: 69
End: 2024-12-10

## 2024-12-10 LAB
ALBUMIN SERPL-MCNC: 3.9 G/DL (ref 3.5–5)
ALBUMIN/GLOB SERPL: 1.3 (ref 1.1–2.2)
ALP SERPL-CCNC: 83 U/L (ref 45–117)
ALT SERPL-CCNC: 26 U/L (ref 12–78)
ANION GAP SERPL CALC-SCNC: 4 MMOL/L (ref 2–12)
AST SERPL-CCNC: 14 U/L (ref 15–37)
BASOPHILS # BLD: 0 K/UL (ref 0–0.1)
BASOPHILS NFR BLD: 1 % (ref 0–1)
BILIRUB DIRECT SERPL-MCNC: 0.2 MG/DL (ref 0–0.2)
BILIRUB SERPL-MCNC: 0.7 MG/DL (ref 0.2–1)
BUN SERPL-MCNC: 20 MG/DL (ref 6–20)
BUN/CREAT SERPL: 19 (ref 12–20)
CALCIUM SERPL-MCNC: 9.2 MG/DL (ref 8.5–10.1)
CHLORIDE SERPL-SCNC: 112 MMOL/L (ref 97–108)
CO2 SERPL-SCNC: 28 MMOL/L (ref 21–32)
CREAT SERPL-MCNC: 1.03 MG/DL (ref 0.7–1.3)
DIFFERENTIAL METHOD BLD: ABNORMAL
EOSINOPHIL # BLD: 0.1 K/UL (ref 0–0.4)
EOSINOPHIL NFR BLD: 2 % (ref 0–7)
ERYTHROCYTE [DISTWIDTH] IN BLOOD BY AUTOMATED COUNT: 16.3 % (ref 11.5–14.5)
GLOBULIN SER CALC-MCNC: 3.1 G/DL (ref 2–4)
GLUCOSE SERPL-MCNC: 109 MG/DL (ref 65–100)
HCT VFR BLD AUTO: 44.2 % (ref 36.6–50.3)
HGB BLD-MCNC: 13.6 G/DL (ref 12.1–17)
IMM GRANULOCYTES # BLD AUTO: 0 K/UL (ref 0–0.04)
IMM GRANULOCYTES NFR BLD AUTO: 0 % (ref 0–0.5)
LYMPHOCYTES # BLD: 1.4 K/UL (ref 0.8–3.5)
LYMPHOCYTES NFR BLD: 31 % (ref 12–49)
MCH RBC QN AUTO: 29.9 PG (ref 26–34)
MCHC RBC AUTO-ENTMCNC: 30.8 G/DL (ref 30–36.5)
MCV RBC AUTO: 97.1 FL (ref 80–99)
MONOCYTES # BLD: 0.3 K/UL (ref 0–1)
MONOCYTES NFR BLD: 7 % (ref 5–13)
NEUTS SEG # BLD: 2.6 K/UL (ref 1.8–8)
NEUTS SEG NFR BLD: 59 % (ref 32–75)
NRBC # BLD: 0 K/UL (ref 0–0.01)
NRBC BLD-RTO: 0 PER 100 WBC
PLATELET # BLD AUTO: 279 K/UL (ref 150–400)
PMV BLD AUTO: 9.4 FL (ref 8.9–12.9)
POTASSIUM SERPL-SCNC: 3.8 MMOL/L (ref 3.5–5.1)
PROT SERPL-MCNC: 7 G/DL (ref 6.4–8.2)
RBC # BLD AUTO: 4.55 M/UL (ref 4.1–5.7)
SODIUM SERPL-SCNC: 144 MMOL/L (ref 136–145)
WBC # BLD AUTO: 4.4 K/UL (ref 4.1–11.1)

## 2024-12-10 NOTE — TELEPHONE ENCOUNTER
Spoke with patient to schedule him for phelbotomy and office visit this week, he had no further questions.

## 2024-12-12 ENCOUNTER — OFFICE VISIT (OUTPATIENT)
Age: 69
End: 2024-12-12
Payer: COMMERCIAL

## 2024-12-12 ENCOUNTER — HOSPITAL ENCOUNTER (OUTPATIENT)
Facility: HOSPITAL | Age: 69
Setting detail: INFUSION SERIES
Discharge: HOME | End: 2024-12-12
Payer: COMMERCIAL

## 2024-12-12 VITALS
TEMPERATURE: 97.9 F | OXYGEN SATURATION: 96 % | DIASTOLIC BLOOD PRESSURE: 74 MMHG | HEART RATE: 62 BPM | RESPIRATION RATE: 18 BRPM | SYSTOLIC BLOOD PRESSURE: 143 MMHG

## 2024-12-12 VITALS
HEIGHT: 70 IN | TEMPERATURE: 97.2 F | HEART RATE: 67 BPM | OXYGEN SATURATION: 98 % | BODY MASS INDEX: 28.77 KG/M2 | WEIGHT: 201 LBS | RESPIRATION RATE: 20 BRPM

## 2024-12-12 DIAGNOSIS — D45 POLYCYTHEMIA VERA (HCC): Primary | ICD-10-CM

## 2024-12-12 PROCEDURE — 99195 PHLEBOTOMY: CPT

## 2024-12-12 PROCEDURE — 99214 OFFICE O/P EST MOD 30 MIN: CPT | Performed by: NURSE PRACTITIONER

## 2024-12-12 PROCEDURE — 1123F ACP DISCUSS/DSCN MKR DOCD: CPT | Performed by: NURSE PRACTITIONER

## 2024-12-12 RX ORDER — 0.9 % SODIUM CHLORIDE 0.9 %
250 INTRAVENOUS SOLUTION INTRAVENOUS ONCE
Status: CANCELLED | OUTPATIENT
Start: 2024-12-12 | End: 2024-12-12

## 2024-12-12 RX ORDER — 0.9 % SODIUM CHLORIDE 0.9 %
250 INTRAVENOUS SOLUTION INTRAVENOUS ONCE
OUTPATIENT
Start: 2024-12-12 | End: 2024-12-12

## 2024-12-12 NOTE — PROGRESS NOTES
Frederick Chang is a 69 y.o. male follow up for polycythemia vera.      1. Have you been to the ER, urgent care clinic since your last visit?  Hospitalized since your last visit?no    2. Have you seen or consulted any other health care providers outside of the [facility] since your last visit?  Include any pap smears or colon screening. no

## 2024-12-12 NOTE — PROGRESS NOTES
Cancer [place] at [facility]  [address], [address] [facility] [address]  W: [phone]  F: [phone]      Reason for Visit:   Frederick Chang is a 69 y.o. male who is seen today for evaluation of polycythemia vera.    Hematology/Oncology Treatment History:   Diagnosed in 2012 at the [facility]  Bone marrow biopsy 4/1/2016: Hypercellular marrow with erythroid predominant trilineage hematopoiesis including atypical megakaryocytic hyperplasia and <5% blasts. Moderate reticulin fibrosis.  Stainable iron minimally present. MPN FISH panel NEGATIVE   Polycythemia Vera  Hydrea initiated 11/2022  Increased to 1000mg on 3/10/2023  Patient reduced to alternating 1000mg/500mg around 3/21/2023  Increased back to 1000mg on 4/14/2023  Increased to 1500mg TIW, 1000mg other days, starting 10/2023  Reduced to 1000mg 1/2024 for thrombocytopenia    History of Present Illness:   He remains on hydrea, taking 1000mg at night.     He wonders if some of this disease is causing some of his stress, short temper at times. He feels increased pressure in his head when phlebotomy is needed. He feels this way now, but numbers have been good. No headache. No dizziness. Denies fever, chills. Reports \"internal pressure\".     He does endorse some seasonal affective disorder this time of year is difficult for him. He is working on prayer and meditation.     Gaining weight.     Review of systems was obtained and pertinent findings reviewed above. Past medical history, social history, family history, medications, and allergies are located in the electronic medical record.    Physical Exam:   Visit Vitals  Pulse 67   Temp 97.2 °F (36.2 °C) (Temporal)   Resp 20   Ht 1.778 m (5' 10\")   Wt 91.2 kg (201 lb)   SpO2 98%   BMI 28.84 kg/m²       General: no distress  Respiratory: normal respiratory effort  CV: no peripheral edema  Skin: no rashes; no ecchymoses; no petechiae      Results:     Lab Results   Component

## 2024-12-12 NOTE — PROGRESS NOTES
[facility] Progress Note    Date: 2024    Name: Frederick Chang    MRN: [id number]         : 1955       Pt arrived ambulatory from seeing Lena Huerta NP no distress for therapeutic phlebotomy. Labs done /  M. [facility] NP stated pt did not need phlebotomyper lab work (no parameters in plan)  but still want to have phlebotomy done and gave the ok to proceed.    18G IV inserted in LAV with +BR.  500 ml whole blood removed without difficulty, pt tolerated well.  Procedure completed at 1230, manual pressure applied until hemostasis noted; wrapped with coban.  Nourishment provided.      Vitals:    24 1211 24 1238   BP: (!) 155/69 (!) 143/74   Pulse: 53 62   Resp: 18    Temp: 97.9 °F (36.6 °C)    TempSrc: Temporal    SpO2: 96%          1238:  VS stable, pt denies lightheadedness/dizziness. PIV flushed and removed per protocol. Pt D/Cd from [facility] ambulatory and in no distress.     Future Appointments   Date Time Provider Department Center   3/13/2025 10:10 AM Ryan Boo MD [facility] BS AMB       Brianna Zacarias, RENÉE  2024

## 2025-01-02 ENCOUNTER — TELEPHONE (OUTPATIENT)
Age: 70
End: 2025-01-02

## 2025-01-02 NOTE — TELEPHONE ENCOUNTER
[facility] [place] [facility]  [phone]    01/02/25- Patient reports increased bloating, reflux, and weight gain over the last month.  Stated he was getting a lot of fiber in his diet and hasn't done so recently, which may be causing some of the bloating.  Stated he was taking Tums at times, he'll also drink apple cider vinegar which does seem to calm down the reflux.      Patient stated he's going to work on dietary changes over the next couple weeks to see if that will help with bloating and reflux.  Recommended trying OTC Pepcid and Gas-x as needed for symptom relief.  Patient verbalized understanding and was appreciative of the call.

## 2025-01-02 NOTE — TELEPHONE ENCOUNTER
Pt called in stating he's been having stomach issues for over a month. Pt stated he's experiencing bloating and reflux. Pt would like to know if he can take anything that would help. Please advise     CB# [phone]

## 2025-01-05 DIAGNOSIS — D45 POLYCYTHEMIA VERA (HCC): ICD-10-CM

## 2025-01-06 RX ORDER — TAMSULOSIN HYDROCHLORIDE 0.4 MG/1
0.4 CAPSULE ORAL DAILY
Qty: 90 CAPSULE | Refills: 3 | Status: SHIPPED | OUTPATIENT
Start: 2025-01-06

## 2025-01-06 RX ORDER — HYDROXYUREA 500 MG/1
1000 CAPSULE ORAL DAILY
Qty: 180 CAPSULE | Refills: 1 | Status: SHIPPED | OUTPATIENT
Start: 2025-01-06

## 2025-01-06 RX ORDER — LISINOPRIL 40 MG/1
40 TABLET ORAL DAILY
Qty: 90 TABLET | Refills: 3 | Status: SHIPPED | OUTPATIENT
Start: 2025-01-06

## 2025-01-06 RX ORDER — TAMSULOSIN HYDROCHLORIDE 0.4 MG/1
CAPSULE ORAL DAILY
Qty: 90 CAPSULE | Refills: 3 | Status: SHIPPED | OUTPATIENT
Start: 2025-01-06

## 2025-01-06 RX ORDER — HYDROXYUREA 500 MG/1
1000 CAPSULE ORAL DAILY
Qty: 180 CAPSULE | Refills: 1 | OUTPATIENT
Start: 2025-01-06

## 2025-02-04 DIAGNOSIS — D45 POLYCYTHEMIA VERA (HCC): ICD-10-CM

## 2025-02-04 LAB
ERYTHROCYTE [DISTWIDTH] IN BLOOD BY AUTOMATED COUNT: 16.3 % (ref 11.5–14.5)
HCT VFR BLD AUTO: 45 % (ref 36.6–50.3)
HGB BLD-MCNC: 14.1 G/DL (ref 12.1–17)
MCH RBC QN AUTO: 29.4 PG (ref 26–34)
MCHC RBC AUTO-ENTMCNC: 31.3 G/DL (ref 30–36.5)
MCV RBC AUTO: 93.9 FL (ref 80–99)
NRBC # BLD: 0 K/UL (ref 0–0.01)
NRBC BLD-RTO: 0 PER 100 WBC
PLATELET # BLD AUTO: 206 K/UL (ref 150–400)
PMV BLD AUTO: 9.6 FL (ref 8.9–12.9)
RBC # BLD AUTO: 4.79 M/UL (ref 4.1–5.7)
WBC # BLD AUTO: 4.6 K/UL (ref 4.1–11.1)

## 2025-02-21 ENCOUNTER — HOSPITAL ENCOUNTER (OUTPATIENT)
Facility: HOSPITAL | Age: 70
Setting detail: INFUSION SERIES
Discharge: HOME | End: 2025-02-21
Payer: COMMERCIAL

## 2025-02-21 ENCOUNTER — TELEPHONE (OUTPATIENT)
Age: 70
End: 2025-02-21

## 2025-02-21 VITALS
OXYGEN SATURATION: 96 % | DIASTOLIC BLOOD PRESSURE: 77 MMHG | SYSTOLIC BLOOD PRESSURE: 165 MMHG | RESPIRATION RATE: 16 BRPM | HEART RATE: 77 BPM | BODY MASS INDEX: 28.55 KG/M2 | TEMPERATURE: 98 F | WEIGHT: 199 LBS

## 2025-02-21 DIAGNOSIS — D45 POLYCYTHEMIA VERA (HCC): Primary | ICD-10-CM

## 2025-02-21 PROCEDURE — 99195 PHLEBOTOMY: CPT

## 2025-02-21 RX ORDER — 0.9 % SODIUM CHLORIDE 0.9 %
250 INTRAVENOUS SOLUTION INTRAVENOUS ONCE
Status: CANCELLED | OUTPATIENT
Start: 2025-02-21 | End: 2025-02-21

## 2025-02-21 RX ORDER — 0.9 % SODIUM CHLORIDE 0.9 %
250 INTRAVENOUS SOLUTION INTRAVENOUS ONCE
OUTPATIENT
Start: 2025-02-21 | End: 2025-02-21

## 2025-02-21 RX ORDER — 0.9 % SODIUM CHLORIDE 0.9 %
250 INTRAVENOUS SOLUTION INTRAVENOUS ONCE
Status: DISCONTINUED | OUTPATIENT
Start: 2025-02-21 | End: 2025-02-22 | Stop reason: HOSPADM

## 2025-02-21 ASSESSMENT — PAIN SCALES - GENERAL: PAINLEVEL_OUTOF10: 0

## 2025-02-21 NOTE — PROGRESS NOTES
[facility] Progress Note    Date: 2025    Name: Frederick Chang    MRN: [id number]         : 1955    Mr. Chang Arrived ambulatory and in no distress for Ctherapeutic Phlebotomy Regimen.  Assessment was completed, no acute issues at this time, no new complaints voiced. Mr. Chang's vitals were reviewed.  Vitals:    25 1527   BP: (!) 165/77   Pulse: 77   Resp: 16   Temp:    SpO2:      Rn used 18 gauge needle into right forearm and extracted 500 ml RBC without difficulty. Site secured upon completion with gauze and coban. Patient denied any dizziness or side effects. Drink and nourishment offered. Patient did no want to wait any more than 15 minutes post treatment before discharge.    Mr. Chang tolerated treatment well and was discharge without difficulties. No further appointments at this time.    COOPER GARCIA RN  2025

## 2025-02-21 NOTE — TELEPHONE ENCOUNTER
Lm with patient to let him know we have him scheduled for a phlebotomy at 2:30pm today, gave him office number to call back to confirm the appointment.

## 2025-03-05 NOTE — PROGRESS NOTES
[facility] [place] at [facility]  [address], [address] [facility] [address]  W: [phone]  F: [phone]      Reason for Visit:   Frederick Chang is a 69 y.o. male who is seen today for evaluation of polycythemia vera.    Hematology/Oncology Treatment History:   Diagnosed in 2012 at the [facility]  Bone marrow biopsy 4/1/2016: Hypercellular marrow with erythroid predominant trilineage hematopoiesis including atypical megakaryocytic hyperplasia and <5% blasts. Moderate reticulin fibrosis.  Stainable iron minimally present. MPN FISH panel NEGATIVE   Polycythemia Vera  Hydrea initiated 11/2022  Increased to 1000mg on 3/10/2023  Patient reduced to alternating 1000mg/500mg around 3/21/2023  Increased back to 1000mg on 4/14/2023  Increased to 1500mg TIW, 1000mg other days, starting 10/2023  Reduced to 1000mg 1/2024 for thrombocytopenia    History of Present Illness:   He reports feeling fairly well, no major changes. He has been feeling some weakness/numbness in his arms and legs at times. He remains on hydrea, taking 1000mg at night.        Review of systems was obtained and pertinent findings reviewed above. Past medical history, social history, family history, medications, and allergies are located in the electronic medical record.    Physical Exam:   Visit Vitals  BP (!) 151/61   Pulse 58   Temp 97.2 °F (36.2 °C)   Ht 1.778 m (5' 10\")   Wt 92.5 kg (204 lb)   SpO2 98%   BMI 29.27 kg/m²         General: no distress  Respiratory: normal respiratory effort  CV: no peripheral edema  Skin: no rashes; no ecchymoses; no petechiae      Results:     Lab Results   Component Value Date    WBC 5.5 03/07/2025    HGB 13.3 03/07/2025    HCT 43.8 03/07/2025     03/07/2025    MCV 94.8 03/07/2025    NEUTROABS 3.02 03/07/2025     Lab Results   Component Value Date     03/07/2025    K 4.1 03/07/2025     03/07/2025    CO2 29 03/07/2025    GLUCOSE 143 (H) 03/07/2025    BUN 19

## 2025-03-07 DIAGNOSIS — D45 POLYCYTHEMIA VERA (HCC): ICD-10-CM

## 2025-03-07 LAB
ALBUMIN SERPL-MCNC: 3.7 G/DL (ref 3.5–5)
ALBUMIN/GLOB SERPL: 1.2 (ref 1.1–2.2)
ALP SERPL-CCNC: 77 U/L (ref 45–117)
ALT SERPL-CCNC: 22 U/L (ref 12–78)
ANION GAP SERPL CALC-SCNC: 6 MMOL/L (ref 2–12)
AST SERPL-CCNC: 16 U/L (ref 15–37)
BASOPHILS # BLD: 0.04 K/UL (ref 0–0.1)
BASOPHILS NFR BLD: 0.7 % (ref 0–1)
BILIRUB DIRECT SERPL-MCNC: 0.1 MG/DL (ref 0–0.2)
BILIRUB SERPL-MCNC: 0.6 MG/DL (ref 0.2–1)
BUN SERPL-MCNC: 19 MG/DL (ref 6–20)
BUN/CREAT SERPL: 17 (ref 12–20)
CALCIUM SERPL-MCNC: 8.9 MG/DL (ref 8.5–10.1)
CHLORIDE SERPL-SCNC: 106 MMOL/L (ref 97–108)
CO2 SERPL-SCNC: 29 MMOL/L (ref 21–32)
CREAT SERPL-MCNC: 1.12 MG/DL (ref 0.7–1.3)
DIFFERENTIAL METHOD BLD: ABNORMAL
EOSINOPHIL # BLD: 0.1 K/UL (ref 0–0.4)
EOSINOPHIL NFR BLD: 1.8 % (ref 0–7)
ERYTHROCYTE [DISTWIDTH] IN BLOOD BY AUTOMATED COUNT: 16.5 % (ref 11.5–14.5)
GLOBULIN SER CALC-MCNC: 3.2 G/DL (ref 2–4)
GLUCOSE SERPL-MCNC: 143 MG/DL (ref 65–100)
HCT VFR BLD AUTO: 43.8 % (ref 36.6–50.3)
HGB BLD-MCNC: 13.3 G/DL (ref 12.1–17)
IMM GRANULOCYTES # BLD AUTO: 0.02 K/UL (ref 0–0.04)
IMM GRANULOCYTES NFR BLD AUTO: 0.4 % (ref 0–0.5)
LYMPHOCYTES # BLD: 1.98 K/UL (ref 0.8–3.5)
LYMPHOCYTES NFR BLD: 35.9 % (ref 12–49)
MCH RBC QN AUTO: 28.8 PG (ref 26–34)
MCHC RBC AUTO-ENTMCNC: 30.4 G/DL (ref 30–36.5)
MCV RBC AUTO: 94.8 FL (ref 80–99)
MONOCYTES # BLD: 0.36 K/UL (ref 0–1)
MONOCYTES NFR BLD: 6.5 % (ref 5–13)
NEUTS SEG # BLD: 3.02 K/UL (ref 1.8–8)
NEUTS SEG NFR BLD: 54.7 % (ref 32–75)
NRBC # BLD: 0 K/UL (ref 0–0.01)
NRBC BLD-RTO: 0 PER 100 WBC
PLATELET # BLD AUTO: 160 K/UL (ref 150–400)
PMV BLD AUTO: 10 FL (ref 8.9–12.9)
POTASSIUM SERPL-SCNC: 4.1 MMOL/L (ref 3.5–5.1)
PROT SERPL-MCNC: 6.9 G/DL (ref 6.4–8.2)
RBC # BLD AUTO: 4.62 M/UL (ref 4.1–5.7)
SODIUM SERPL-SCNC: 141 MMOL/L (ref 136–145)
WBC # BLD AUTO: 5.5 K/UL (ref 4.1–11.1)

## 2025-03-13 ENCOUNTER — OFFICE VISIT (OUTPATIENT)
Age: 70
End: 2025-03-13
Payer: COMMERCIAL

## 2025-03-13 VITALS
WEIGHT: 204 LBS | SYSTOLIC BLOOD PRESSURE: 151 MMHG | OXYGEN SATURATION: 98 % | HEART RATE: 58 BPM | HEIGHT: 70 IN | BODY MASS INDEX: 29.2 KG/M2 | TEMPERATURE: 97.2 F | DIASTOLIC BLOOD PRESSURE: 61 MMHG

## 2025-03-13 DIAGNOSIS — D45 POLYCYTHEMIA VERA (HCC): Primary | ICD-10-CM

## 2025-03-13 PROCEDURE — 99214 OFFICE O/P EST MOD 30 MIN: CPT | Performed by: INTERNAL MEDICINE

## 2025-03-13 PROCEDURE — G2211 COMPLEX E/M VISIT ADD ON: HCPCS | Performed by: INTERNAL MEDICINE

## 2025-03-13 PROCEDURE — 3077F SYST BP >= 140 MM HG: CPT | Performed by: INTERNAL MEDICINE

## 2025-03-13 PROCEDURE — 1123F ACP DISCUSS/DSCN MKR DOCD: CPT | Performed by: INTERNAL MEDICINE

## 2025-03-13 PROCEDURE — 3078F DIAST BP <80 MM HG: CPT | Performed by: INTERNAL MEDICINE

## 2025-03-13 RX ORDER — CETIRIZINE HYDROCHLORIDE 5 MG/1
5 TABLET ORAL DAILY
COMMUNITY

## 2025-03-13 NOTE — PROGRESS NOTES
Frederick Chang is a 69 y.o. male    Chief Complaint   Patient presents with    Follow-up       BP (!) 151/61   Pulse 58   Temp 97.2 °F (36.2 °C)   Ht 1.778 m (5' 10\")   Wt 92.5 kg (204 lb)   SpO2 98%   BMI 29.27 kg/m²         1. Have you been to the ER, urgent care clinic since your last visit?  Hospitalized since your last visit? No    2. Have you seen or consulted any other health care providers outside of the [facility] since your last visit?  Include any pap smears or colon screening. No    Learning Assessment:       No data to display                Fall Risk Assessment:      11/14/2023     4:06 PM 11/14/2023     4:05 PM 1/19/2023     3:15 PM 11/28/2022     2:39 PM 8/29/2022     2:30 PM 4/30/2021     7:30 AM 4/30/2021    12:20 AM   Amb Fall Risk Assessment and TUG Test   Do you feel unsteady or are you worried about falling?  no no        2 or more falls in past year? no no        Fall with injury in past year? no no        Fall in past 12 months?     0     Able to walk?     Yes     Total Score   1 1  2 1       Abuse Screening:       No data to display                ADL Screening:       No data to display

## 2025-04-15 ENCOUNTER — RESULTS FOLLOW-UP (OUTPATIENT)
Age: 70
End: 2025-04-15

## 2025-04-15 DIAGNOSIS — D45 POLYCYTHEMIA VERA (HCC): ICD-10-CM

## 2025-04-15 LAB
BASOPHILS # BLD: 0.05 K/UL (ref 0–0.1)
BASOPHILS NFR BLD: 0.9 % (ref 0–1)
DIFFERENTIAL METHOD BLD: ABNORMAL
EOSINOPHIL # BLD: 0.09 K/UL (ref 0–0.4)
EOSINOPHIL NFR BLD: 1.5 % (ref 0–7)
ERYTHROCYTE [DISTWIDTH] IN BLOOD BY AUTOMATED COUNT: 16.5 % (ref 11.5–14.5)
HCT VFR BLD AUTO: 46.3 % (ref 36.6–50.3)
HGB BLD-MCNC: 14.1 G/DL (ref 12.1–17)
IMM GRANULOCYTES # BLD AUTO: 0.01 K/UL (ref 0–0.04)
IMM GRANULOCYTES NFR BLD AUTO: 0.2 % (ref 0–0.5)
LYMPHOCYTES # BLD: 2.36 K/UL (ref 0.8–3.5)
LYMPHOCYTES NFR BLD: 40.5 % (ref 12–49)
MCH RBC QN AUTO: 29.3 PG (ref 26–34)
MCHC RBC AUTO-ENTMCNC: 30.5 G/DL (ref 30–36.5)
MCV RBC AUTO: 96.1 FL (ref 80–99)
MONOCYTES # BLD: 0.51 K/UL (ref 0–1)
MONOCYTES NFR BLD: 8.7 % (ref 5–13)
NEUTS SEG # BLD: 2.81 K/UL (ref 1.8–8)
NEUTS SEG NFR BLD: 48.2 % (ref 32–75)
NRBC # BLD: 0 K/UL (ref 0–0.01)
NRBC BLD-RTO: 0 PER 100 WBC
PLATELET # BLD AUTO: 220 K/UL (ref 150–400)
PMV BLD AUTO: 10 FL (ref 8.9–12.9)
RBC # BLD AUTO: 4.82 M/UL (ref 4.1–5.7)
WBC # BLD AUTO: 5.8 K/UL (ref 4.1–11.1)

## 2025-04-15 NOTE — TELEPHONE ENCOUNTER
[facility] [place] at [facility]  [phone]    HCT above goal. I recommend proceeding with therapeutic phlebotomy sometime soon.

## 2025-04-16 RX ORDER — 0.9 % SODIUM CHLORIDE 0.9 %
250 INTRAVENOUS SOLUTION INTRAVENOUS ONCE
Status: CANCELLED | OUTPATIENT
Start: 2025-04-16 | End: 2025-04-16

## 2025-04-16 NOTE — TELEPHONE ENCOUNTER
Spoke with patient to get him scheduled for a therapeutic phlebotomy, he had no further questions.

## 2025-04-18 ENCOUNTER — HOSPITAL ENCOUNTER (OUTPATIENT)
Facility: HOSPITAL | Age: 70
Setting detail: INFUSION SERIES
Discharge: HOME | End: 2025-04-18
Payer: COMMERCIAL

## 2025-04-18 VITALS
DIASTOLIC BLOOD PRESSURE: 78 MMHG | TEMPERATURE: 98 F | OXYGEN SATURATION: 97 % | HEART RATE: 50 BPM | WEIGHT: 197 LBS | RESPIRATION RATE: 16 BRPM | SYSTOLIC BLOOD PRESSURE: 167 MMHG | BODY MASS INDEX: 28.27 KG/M2

## 2025-04-18 DIAGNOSIS — D45 POLYCYTHEMIA VERA (HCC): Primary | ICD-10-CM

## 2025-04-18 PROCEDURE — 99195 PHLEBOTOMY: CPT

## 2025-04-18 RX ORDER — 0.9 % SODIUM CHLORIDE 0.9 %
250 INTRAVENOUS SOLUTION INTRAVENOUS ONCE
OUTPATIENT
Start: 2025-04-18 | End: 2025-04-18

## 2025-04-18 ASSESSMENT — PAIN SCALES - GENERAL: PAINLEVEL_OUTOF10: 0

## 2025-04-18 NOTE — PROGRESS NOTES
[facility] Progress Note    Date: 2025    Name: Frederick Chang    MRN: [id number]         : 1955    Mr. Chang Arrived ambulatory and in no distress for Therapeutic Phlebotomy.  Assessment was completed, no acute issues at this time, no new complaints voiced. No hold parameters      Mr. Chang's vitals were reviewed.  Vitals:    25 1430 25 1530   BP: (!) 175/77 (!) 167/78   Pulse: 53 50   Resp: 16    Temp: 98 °F (36.7 °C)    TempSrc: Temporal    SpO2: 97%    Weight: 89.4 kg (197 lb)              RAC accessed using 20 g IV set after 3 unsuccessful attempts.   500 ml whole blood removed without difficulty, pt tolerated well.  Procedure completed and  manual pressure applied until hemostasis noted; wrapped with coban.  Nourishment declined.      Mr. Chang tolerated treatment well and [facility].    Patient is aware of  his next appointment with Dr. Boo's team on 25 at 1010.    Modesta Palacio RN  2025

## 2025-04-30 DIAGNOSIS — N40.1 BENIGN PROSTATIC HYPERPLASIA WITH URINARY FREQUENCY: Primary | ICD-10-CM

## 2025-04-30 DIAGNOSIS — R35.0 BENIGN PROSTATIC HYPERPLASIA WITH URINARY FREQUENCY: Primary | ICD-10-CM

## 2025-05-23 DIAGNOSIS — D45 POLYCYTHEMIA VERA (HCC): ICD-10-CM

## 2025-05-23 LAB
BASOPHILS # BLD: 0.03 K/UL (ref 0–0.1)
BASOPHILS NFR BLD: 0.7 % (ref 0–1)
DIFFERENTIAL METHOD BLD: ABNORMAL
EOSINOPHIL # BLD: 0.05 K/UL (ref 0–0.4)
EOSINOPHIL NFR BLD: 1.2 % (ref 0–7)
ERYTHROCYTE [DISTWIDTH] IN BLOOD BY AUTOMATED COUNT: 16.4 % (ref 11.5–14.5)
HCT VFR BLD AUTO: 46.5 % (ref 36.6–50.3)
HGB BLD-MCNC: 13.5 G/DL (ref 12.1–17)
IMM GRANULOCYTES # BLD AUTO: 0.01 K/UL (ref 0–0.04)
IMM GRANULOCYTES NFR BLD AUTO: 0.2 % (ref 0–0.5)
LYMPHOCYTES # BLD: 1.64 K/UL (ref 0.8–3.5)
LYMPHOCYTES NFR BLD: 39.1 % (ref 12–49)
MCH RBC QN AUTO: 28.7 PG (ref 26–34)
MCHC RBC AUTO-ENTMCNC: 29 G/DL (ref 30–36.5)
MCV RBC AUTO: 98.9 FL (ref 80–99)
MONOCYTES # BLD: 0.36 K/UL (ref 0–1)
MONOCYTES NFR BLD: 8.6 % (ref 5–13)
NEUTS SEG # BLD: 2.1 K/UL (ref 1.8–8)
NEUTS SEG NFR BLD: 50.2 % (ref 32–75)
NRBC # BLD: 0 K/UL (ref 0–0.01)
NRBC BLD-RTO: 0 PER 100 WBC
PLATELET # BLD AUTO: 223 K/UL (ref 150–400)
PMV BLD AUTO: 9.8 FL (ref 8.9–12.9)
RBC # BLD AUTO: 4.7 M/UL (ref 4.1–5.7)
WBC # BLD AUTO: 4.2 K/UL (ref 4.1–11.1)

## 2025-05-26 ENCOUNTER — TELEPHONE (OUTPATIENT)
Age: 70
End: 2025-05-26

## 2025-05-26 NOTE — TELEPHONE ENCOUNTER
[facility] [place] at   [phone]    Labs reviewed. HCT remains above goal, currently 46.5. This is despite phlebotomy done on 4/18 and the patient increasing his Hydrea to 1500mg once a week, 1000mg rest of week.    I recommend therapeutic phlebotomy again sometime soon.

## 2025-05-27 ENCOUNTER — TELEPHONE (OUTPATIENT)
Age: 70
End: 2025-05-27

## 2025-05-27 RX ORDER — 0.9 % SODIUM CHLORIDE 0.9 %
250 INTRAVENOUS SOLUTION INTRAVENOUS ONCE
Status: CANCELLED | OUTPATIENT
Start: 2025-05-28 | End: 2025-05-28

## 2025-05-27 NOTE — TELEPHONE ENCOUNTER
[facility] [place] at [facility]  [phone]    05/27/25- Informed patient of Hct 46.5 and Dr. Boo's recommendation for therapeutic phlebotomy sometime soon.  Confirmed Hydrea dose of 1500 mg once a week, 1000 mg rest of the week.  Will discuss dosing at follow up visit.  Patient verbalized understanding, call transferred to Lori CANTU to schedule.

## 2025-05-30 ENCOUNTER — HOSPITAL ENCOUNTER (OUTPATIENT)
Facility: HOSPITAL | Age: 70
Setting detail: INFUSION SERIES
Discharge: HOME | End: 2025-05-30
Payer: COMMERCIAL

## 2025-05-30 VITALS
HEIGHT: 70 IN | TEMPERATURE: 98 F | HEART RATE: 65 BPM | OXYGEN SATURATION: 96 % | SYSTOLIC BLOOD PRESSURE: 179 MMHG | RESPIRATION RATE: 18 BRPM | DIASTOLIC BLOOD PRESSURE: 72 MMHG | WEIGHT: 197 LBS | BODY MASS INDEX: 28.2 KG/M2

## 2025-05-30 DIAGNOSIS — D45 POLYCYTHEMIA VERA (HCC): Primary | ICD-10-CM

## 2025-05-30 PROCEDURE — 99195 PHLEBOTOMY: CPT

## 2025-05-30 RX ORDER — 0.9 % SODIUM CHLORIDE 0.9 %
250 INTRAVENOUS SOLUTION INTRAVENOUS ONCE
Status: CANCELLED | OUTPATIENT
Start: 2025-05-30 | End: 2025-05-30

## 2025-05-30 RX ORDER — 0.9 % SODIUM CHLORIDE 0.9 %
250 INTRAVENOUS SOLUTION INTRAVENOUS ONCE
Status: DISCONTINUED | OUTPATIENT
Start: 2025-05-30 | End: 2025-05-31 | Stop reason: HOSPADM

## 2025-05-30 ASSESSMENT — PAIN SCALES - GENERAL: PAINLEVEL_OUTOF10: 0

## 2025-05-30 NOTE — PROGRESS NOTES
[facility] Progress Note    Date: May 30, 2025    Name: Frederick Chang    MRN: [id number]         : 1955    Mr. Chang  arrived ambulatory and in no distress for therapeutic phlebotomy. Assessment was completed, no acute issues at this time, no new complaints voiced.         Mr. Chang's vitals were reviewed.  Vitals:    25 1400   BP: (!) 185/73   Pulse: 65   Resp: 18   Temp: 98 °F (36.7 °C)   SpO2: 96%              Left arm vein accessed using 18G PIV.  500 ml whole blood removed without difficulty, pt tolerated well.  Procedure completed, manual pressure applied until hemostasis noted; wrapped with coban.  Nourishment provided.              VS stable, pt denies lightheadedness/dizziness.  Pt D/Cd from [facility] ambulatory and in no distress.    Mr. Chang tolerated treatment well and was [facility].   Patient is aware of his next appointment.    Sam Mccarthy RN  May 30, 2025

## 2025-06-05 RX ORDER — CEFDINIR 300 MG/1
300 CAPSULE ORAL 2 TIMES DAILY
Qty: 20 CAPSULE | Refills: 0 | Status: SHIPPED | OUTPATIENT
Start: 2025-06-05 | End: 2025-06-15

## 2025-06-09 ENCOUNTER — OFFICE VISIT (OUTPATIENT)
Facility: CLINIC | Age: 70
End: 2025-06-09
Payer: COMMERCIAL

## 2025-06-09 VITALS
HEIGHT: 70 IN | DIASTOLIC BLOOD PRESSURE: 70 MMHG | TEMPERATURE: 97.4 F | HEART RATE: 69 BPM | SYSTOLIC BLOOD PRESSURE: 130 MMHG | OXYGEN SATURATION: 97 % | WEIGHT: 200.8 LBS | BODY MASS INDEX: 28.75 KG/M2

## 2025-06-09 DIAGNOSIS — I10 PRIMARY HYPERTENSION: Primary | ICD-10-CM

## 2025-06-09 DIAGNOSIS — R55 VASOVAGAL EPISODE: ICD-10-CM

## 2025-06-09 DIAGNOSIS — L71.9 ROSACEA: ICD-10-CM

## 2025-06-09 DIAGNOSIS — D45 POLYCYTHEMIA VERA (HCC): ICD-10-CM

## 2025-06-09 PROCEDURE — 3075F SYST BP GE 130 - 139MM HG: CPT | Performed by: NURSE PRACTITIONER

## 2025-06-09 PROCEDURE — 99214 OFFICE O/P EST MOD 30 MIN: CPT | Performed by: NURSE PRACTITIONER

## 2025-06-09 PROCEDURE — 3078F DIAST BP <80 MM HG: CPT | Performed by: NURSE PRACTITIONER

## 2025-06-09 PROCEDURE — 1123F ACP DISCUSS/DSCN MKR DOCD: CPT | Performed by: NURSE PRACTITIONER

## 2025-06-09 RX ORDER — DOXYCYCLINE HYCLATE 50 MG/1
50 CAPSULE ORAL DAILY
Qty: 30 CAPSULE | Refills: 5 | Status: SHIPPED | OUTPATIENT
Start: 2025-06-09

## 2025-06-09 NOTE — PROGRESS NOTES
Frederick Chang (:  1955) is a 69 y.o. male, Established patient, here for evaluation of the following chief complaint(s):  Follow-up ([employer] Follow up) and Dizziness         Assessment & Plan  1. Hypertension.  - Blood pressure readings are within the normal range today, with a systolic reading of 130.  - Advised to monitor blood pressure daily, alternating between morning and evening readings, and using both arms. Target range for systolic blood pressure is between 100 and 160, and for diastolic blood pressure, it is between 60 and 90.  - Instructed to contact the office if systolic blood pressure exceeds 160 or falls below 100, and if diastolic blood pressure exceeds 90 or falls below 60.  - Advised to rest for 15 minutes prior to taking blood pressure readings.    2. Rosacea.  - Advised to continue using CeraVe soap.  - Prescription for doxycycline provided, with instructions to take it once daily until the condition resolves, after which the medication can be discontinued. If the condition recurs, the medication can be resumed.    3. Caffeine intake.  - Advised to gradually reduce caffeine intake.    4. Smoking cessation.  - Expressed a desire to quit smoking and plans to do so on his own.    5. Health maintenance.  - Platelet count is within the normal range.  - Scheduled for a checkup in 2025.    6. Ear itching.  - Reassured that the use of olive oil or sweet oil for ear itching is safe.    Follow-up  The patient will follow up in 2025.    Results  Labs   - Platelet count: Normal  1. Primary hypertension  2. Rosacea  -     doxycycline (VIBRAMYCIN) 50 MG capsule; Take 1 capsule by mouth Daily, Disp-30 capsule, R-5Normal  3. Polycythemia vera (HCC)  4. Vasovagal episode    Return for well exam after 25.       Subjective   History of Present Illness  The patient presents for evaluation of hypertension, caffeine intake, smoking cessation, and rosacea.    He has been monitoring his

## 2025-06-09 NOTE — PROGRESS NOTES
Chief Complaint   Patient presents with    Follow-up     [employer] Follow up    Dizziness     \"Have you been to the ER, urgent care clinic since your last visit?  Hospitalized since your last visit?\"    NO    “Have you seen or consulted any other health care providers outside of [facility] since your last visit?”    NO     /70 (BP Site: Left Upper Arm, Patient Position: Sitting)   Pulse 69   Temp 97.4 °F (36.3 °C) (Temporal)   Ht 1.778 m (5' 10\")   Wt 91.1 kg (200 lb 12.8 oz)   SpO2 97%   BMI 28.81 kg/m²      No data recorded         No questionnaires available.                          “Have you had a colorectal cancer screening such as a colonoscopy/FIT/Cologuard?    NO    Date of last Colonoscopy: 2015  No cologuard on file  No FIT/FOBT on file   No flexible sigmoidoscopy on file           Click Here for Release of Records Request     Identified Patient with 2 Patient Identifiers-Name and

## 2025-06-10 DIAGNOSIS — D45 POLYCYTHEMIA VERA (HCC): ICD-10-CM

## 2025-06-10 LAB
ALBUMIN SERPL-MCNC: 3.8 G/DL (ref 3.5–5)
ALBUMIN/GLOB SERPL: 1.3 (ref 1.1–2.2)
ALP SERPL-CCNC: 82 U/L (ref 45–117)
ALT SERPL-CCNC: 29 U/L (ref 12–78)
ANION GAP SERPL CALC-SCNC: 5 MMOL/L (ref 2–12)
AST SERPL-CCNC: 14 U/L (ref 15–37)
BASOPHILS # BLD: 0.04 K/UL (ref 0–0.1)
BASOPHILS NFR BLD: 0.9 % (ref 0–1)
BILIRUB DIRECT SERPL-MCNC: 0.1 MG/DL (ref 0–0.2)
BILIRUB SERPL-MCNC: 0.6 MG/DL (ref 0.2–1)
BUN SERPL-MCNC: 18 MG/DL (ref 6–20)
BUN/CREAT SERPL: 18 (ref 12–20)
CALCIUM SERPL-MCNC: 8.6 MG/DL (ref 8.5–10.1)
CHLORIDE SERPL-SCNC: 107 MMOL/L (ref 97–108)
CO2 SERPL-SCNC: 29 MMOL/L (ref 21–32)
CREAT SERPL-MCNC: 0.99 MG/DL (ref 0.7–1.3)
DIFFERENTIAL METHOD BLD: ABNORMAL
EOSINOPHIL # BLD: 0.07 K/UL (ref 0–0.4)
EOSINOPHIL NFR BLD: 1.6 % (ref 0–7)
ERYTHROCYTE [DISTWIDTH] IN BLOOD BY AUTOMATED COUNT: 16.8 % (ref 11.5–14.5)
GLOBULIN SER CALC-MCNC: 3 G/DL (ref 2–4)
GLUCOSE SERPL-MCNC: 143 MG/DL (ref 65–100)
HCT VFR BLD AUTO: 41.8 % (ref 36.6–50.3)
HGB BLD-MCNC: 12.7 G/DL (ref 12.1–17)
IMM GRANULOCYTES # BLD AUTO: 0.01 K/UL (ref 0–0.04)
IMM GRANULOCYTES NFR BLD AUTO: 0.2 % (ref 0–0.5)
LYMPHOCYTES # BLD: 1.8 K/UL (ref 0.8–3.5)
LYMPHOCYTES NFR BLD: 41.3 % (ref 12–49)
MCH RBC QN AUTO: 28.9 PG (ref 26–34)
MCHC RBC AUTO-ENTMCNC: 30.4 G/DL (ref 30–36.5)
MCV RBC AUTO: 95 FL (ref 80–99)
MONOCYTES # BLD: 0.26 K/UL (ref 0–1)
MONOCYTES NFR BLD: 6 % (ref 5–13)
NEUTS SEG # BLD: 2.18 K/UL (ref 1.8–8)
NEUTS SEG NFR BLD: 50 % (ref 32–75)
NRBC # BLD: 0 K/UL (ref 0–0.01)
NRBC BLD-RTO: 0 PER 100 WBC
PLATELET # BLD AUTO: 141 K/UL (ref 150–400)
PMV BLD AUTO: 9.5 FL (ref 8.9–12.9)
POTASSIUM SERPL-SCNC: 4 MMOL/L (ref 3.5–5.1)
PROT SERPL-MCNC: 6.8 G/DL (ref 6.4–8.2)
RBC # BLD AUTO: 4.4 M/UL (ref 4.1–5.7)
SODIUM SERPL-SCNC: 141 MMOL/L (ref 136–145)
WBC # BLD AUTO: 4.4 K/UL (ref 4.1–11.1)

## 2025-06-12 ENCOUNTER — OFFICE VISIT (OUTPATIENT)
Age: 70
End: 2025-06-12
Payer: COMMERCIAL

## 2025-06-12 VITALS
HEART RATE: 53 BPM | TEMPERATURE: 97.3 F | WEIGHT: 200 LBS | BODY MASS INDEX: 28.63 KG/M2 | HEIGHT: 70 IN | OXYGEN SATURATION: 99 % | RESPIRATION RATE: 18 BRPM | SYSTOLIC BLOOD PRESSURE: 165 MMHG | DIASTOLIC BLOOD PRESSURE: 89 MMHG

## 2025-06-12 DIAGNOSIS — D45 POLYCYTHEMIA VERA (HCC): Primary | ICD-10-CM

## 2025-06-12 PROCEDURE — 99214 OFFICE O/P EST MOD 30 MIN: CPT | Performed by: NURSE PRACTITIONER

## 2025-06-12 PROCEDURE — 3079F DIAST BP 80-89 MM HG: CPT | Performed by: NURSE PRACTITIONER

## 2025-06-12 PROCEDURE — 3077F SYST BP >= 140 MM HG: CPT | Performed by: NURSE PRACTITIONER

## 2025-06-12 PROCEDURE — 1123F ACP DISCUSS/DSCN MKR DOCD: CPT | Performed by: NURSE PRACTITIONER

## 2025-06-12 NOTE — PROGRESS NOTES
petechiae    Results:     Lab Results   Component Value Date    WBC 4.4 06/10/2025    HGB 12.7 06/10/2025    HCT 41.8 06/10/2025     (L) 06/10/2025    MCV 95.0 06/10/2025    NEUTROABS 2.18 06/10/2025     Lab Results   Component Value Date     06/10/2025    K 4.0 06/10/2025     06/10/2025    CO2 29 06/10/2025    GLUCOSE 143 (H) 06/10/2025    BUN 18 06/10/2025    CREATININE 0.99 06/10/2025    LABGLOM 83 06/10/2025    CALCIUM 8.6 06/10/2025    MG 2.4 02/09/2022    PHOS 3.7 02/10/2022     Lab Results   Component Value Date    BILITOT 0.6 06/10/2025    ALT 29 06/10/2025    AST 14 (L) 06/10/2025    ALKPHOS 82 06/10/2025    GLOB 3.0 06/10/2025     Lab Results   Component Value Date    TJXTFGQB99 1310 (H) 02/10/2022    FOLATE 14.8 02/10/2022    TSH 2.47 07/22/2024    HEPCAB 0.2 08/30/2021     Lab Results   Component Value Date    INR 1.1 02/09/2022    PROTIME 11.1 02/09/2022    APTT 27.3 03/31/2021    CRP <0.29 02/09/2022     Hematocrit (%)   Date Value   06/10/2025 41.8   05/23/2025 46.5   04/15/2025 46.3   03/07/2025 43.8   02/04/2025 45.0   12/09/2024 44.2   11/13/2024 44.2   10/10/2024 42.6   09/04/2024 48.1   07/31/2024 43.0   05/29/2024 45.7   05/07/2024 44.8   04/19/2024 44.6   03/05/2024 46.2   01/12/2024 45.5   12/06/2023 43.7   11/15/2023 41.4   10/18/2023 41.4   08/22/2023 48.1   07/23/2023 42.1   07/07/2023 48.9   06/20/2023 44.1   05/30/2023 41.0   04/27/2023 45.6   04/13/2023 49.7   03/20/2023 42.9   01/18/2023 47.3   12/20/2022 44.8   11/21/2022 46.4   02/10/2022 39.5   02/09/2022 40.1   04/29/2021 34.1 (L)   04/01/2021 43.6   03/31/2021 41.4   08/30/2019 48.1     Platelets   Date Value   06/10/2025 141 K/uL (L)   05/23/2025 223 K/uL   04/15/2025 220 K/uL   03/07/2025 160 K/uL   02/04/2025 206 K/uL   12/09/2024 279 K/uL   11/13/2024 274 K/uL   10/10/2024 104 K/uL (L)   09/04/2024 257 K/uL   07/31/2024 162 K/uL   05/29/2024 273 K/uL   05/07/2024 221 K/uL   04/19/2024 155 K/uL   03/05/2024

## 2025-07-02 DIAGNOSIS — D45 POLYCYTHEMIA VERA (HCC): ICD-10-CM

## 2025-07-02 RX ORDER — HYDROXYUREA 500 MG/1
1000 CAPSULE ORAL DAILY
Qty: 180 CAPSULE | Refills: 1 | Status: SHIPPED | OUTPATIENT
Start: 2025-07-02

## 2025-07-14 DIAGNOSIS — D45 POLYCYTHEMIA VERA (HCC): ICD-10-CM

## 2025-07-14 LAB
ALBUMIN SERPL-MCNC: 4 G/DL (ref 3.5–5)
ALBUMIN/GLOB SERPL: 1.3 (ref 1.1–2.2)
ALP SERPL-CCNC: 79 U/L (ref 45–117)
ALT SERPL-CCNC: 25 U/L (ref 12–78)
ANION GAP SERPL CALC-SCNC: 6 MMOL/L (ref 2–12)
AST SERPL-CCNC: 15 U/L (ref 15–37)
BASOPHILS # BLD: 0.06 K/UL (ref 0–0.1)
BASOPHILS NFR BLD: 1 % (ref 0–1)
BILIRUB DIRECT SERPL-MCNC: 0.1 MG/DL (ref 0–0.2)
BILIRUB SERPL-MCNC: 0.5 MG/DL (ref 0.2–1)
BUN SERPL-MCNC: 21 MG/DL (ref 6–20)
BUN/CREAT SERPL: 20 (ref 12–20)
CALCIUM SERPL-MCNC: 9.1 MG/DL (ref 8.5–10.1)
CHLORIDE SERPL-SCNC: 108 MMOL/L (ref 97–108)
CO2 SERPL-SCNC: 26 MMOL/L (ref 21–32)
CREAT SERPL-MCNC: 1.07 MG/DL (ref 0.7–1.3)
DIFFERENTIAL METHOD BLD: ABNORMAL
EOSINOPHIL # BLD: 0.1 K/UL (ref 0–0.4)
EOSINOPHIL NFR BLD: 1.7 % (ref 0–7)
ERYTHROCYTE [DISTWIDTH] IN BLOOD BY AUTOMATED COUNT: 17.1 % (ref 11.5–14.5)
GLOBULIN SER CALC-MCNC: 3 G/DL (ref 2–4)
GLUCOSE SERPL-MCNC: 111 MG/DL (ref 65–100)
HCT VFR BLD AUTO: 45.4 % (ref 36.6–50.3)
HGB BLD-MCNC: 13 G/DL (ref 12.1–17)
IMM GRANULOCYTES # BLD AUTO: 0.03 K/UL (ref 0–0.04)
IMM GRANULOCYTES NFR BLD AUTO: 0.5 % (ref 0–0.5)
LYMPHOCYTES # BLD: 2.3 K/UL (ref 0.8–3.5)
LYMPHOCYTES NFR BLD: 39 % (ref 12–49)
MCH RBC QN AUTO: 28.6 PG (ref 26–34)
MCHC RBC AUTO-ENTMCNC: 28.6 G/DL (ref 30–36.5)
MCV RBC AUTO: 100 FL (ref 80–99)
MONOCYTES # BLD: 0.47 K/UL (ref 0–1)
MONOCYTES NFR BLD: 8 % (ref 5–13)
NEUTS SEG # BLD: 2.94 K/UL (ref 1.8–8)
NEUTS SEG NFR BLD: 49.8 % (ref 32–75)
NRBC # BLD: 0 K/UL (ref 0–0.01)
NRBC BLD-RTO: 0 PER 100 WBC
PLATELET # BLD AUTO: 256 K/UL (ref 150–400)
PMV BLD AUTO: 9.5 FL (ref 8.9–12.9)
POTASSIUM SERPL-SCNC: 4.8 MMOL/L (ref 3.5–5.1)
PROT SERPL-MCNC: 7 G/DL (ref 6.4–8.2)
RBC # BLD AUTO: 4.54 M/UL (ref 4.1–5.7)
RBC MORPH BLD: ABNORMAL
RBC MORPH BLD: ABNORMAL
SODIUM SERPL-SCNC: 140 MMOL/L (ref 136–145)
WBC # BLD AUTO: 5.9 K/UL (ref 4.1–11.1)

## 2025-07-15 ENCOUNTER — TELEPHONE (OUTPATIENT)
Age: 70
End: 2025-07-15

## 2025-07-15 RX ORDER — 0.9 % SODIUM CHLORIDE 0.9 %
250 INTRAVENOUS SOLUTION INTRAVENOUS ONCE
Status: CANCELLED | OUTPATIENT
Start: 2025-07-16 | End: 2025-07-16

## 2025-07-15 NOTE — TELEPHONE ENCOUNTER
Patient called and stated that he would like a call back to discuss getting a phlebotomy scheduled. Requested a call back.     # [phone]

## 2025-07-18 ENCOUNTER — HOSPITAL ENCOUNTER (OUTPATIENT)
Facility: HOSPITAL | Age: 70
Setting detail: INFUSION SERIES
Discharge: HOME | End: 2025-07-18
Payer: COMMERCIAL

## 2025-07-18 VITALS
TEMPERATURE: 98.1 F | DIASTOLIC BLOOD PRESSURE: 80 MMHG | HEART RATE: 70 BPM | RESPIRATION RATE: 16 BRPM | SYSTOLIC BLOOD PRESSURE: 132 MMHG | OXYGEN SATURATION: 96 %

## 2025-07-18 DIAGNOSIS — D45 POLYCYTHEMIA VERA (HCC): Primary | ICD-10-CM

## 2025-07-18 PROCEDURE — 99195 PHLEBOTOMY: CPT

## 2025-07-18 RX ORDER — 0.9 % SODIUM CHLORIDE 0.9 %
250 INTRAVENOUS SOLUTION INTRAVENOUS ONCE
OUTPATIENT
Start: 2025-07-18 | End: 2025-07-18

## 2025-07-18 ASSESSMENT — PAIN SCALES - GENERAL: PAINLEVEL_OUTOF10: 0

## 2025-07-18 NOTE — PROGRESS NOTES
Pt arrived ambulatory and in no distress for therapeutic phlebotomy.  Labs drawn on 7/14/25 and noted to be within treatment parameters. No labs ordered for today.    Vitals:    07/18/25 1515   BP: 132/80   Pulse:    Resp:    Temp:    SpO2:        Left hand vein accessed using 20 g therapeutic phlebotomy set.  500 ml whole blood removed without difficulty, pt tolerated well.  Procedure completed, manual pressure applied until hemostasis noted; wrapped with coban.  Nourishment provided.       VS stable, pt denies lightheadedness/dizziness.  Pt D/Cd from [facility] ambulatory and in no distress. Next appt 9/18/25.    Elina Marin RN

## 2025-08-18 DIAGNOSIS — R73.01 IMPAIRED FASTING BLOOD SUGAR: ICD-10-CM

## 2025-08-18 DIAGNOSIS — I10 PRIMARY HYPERTENSION: Primary | ICD-10-CM

## 2025-08-18 DIAGNOSIS — R53.83 OTHER FATIGUE: ICD-10-CM

## 2025-08-18 DIAGNOSIS — Z12.5 SCREENING PSA (PROSTATE SPECIFIC ANTIGEN): ICD-10-CM

## 2025-08-19 DIAGNOSIS — D45 POLYCYTHEMIA VERA (HCC): ICD-10-CM

## 2025-08-20 LAB
BASOPHILS # BLD: 0.05 K/UL (ref 0–0.1)
BASOPHILS NFR BLD: 0.9 % (ref 0–1)
CHOLEST SERPL-MCNC: 104 MG/DL (ref 100–199)
DIFFERENTIAL METHOD BLD: ABNORMAL
EOSINOPHIL # BLD: 0.09 K/UL (ref 0–0.4)
EOSINOPHIL NFR BLD: 1.6 % (ref 0–7)
ERYTHROCYTE [DISTWIDTH] IN BLOOD BY AUTOMATED COUNT: 16.4 % (ref 11.5–14.5)
EST. AVERAGE GLUCOSE BLD GHB EST-MCNC: 114 MG/DL
HBA1C MFR BLD: 5.6 % (ref 4.8–5.6)
HCT VFR BLD AUTO: 41.9 % (ref 36.6–50.3)
HDLC SERPL-MCNC: 37 MG/DL
HGB BLD-MCNC: 12.3 G/DL (ref 12.1–17)
IMM GRANULOCYTES # BLD AUTO: 0 K/UL (ref 0–0.04)
IMM GRANULOCYTES NFR BLD AUTO: 0 % (ref 0–0.5)
LDLC SERPL CALC-MCNC: 48 MG/DL (ref 0–99)
LYMPHOCYTES # BLD: 2.47 K/UL (ref 0.8–3.5)
LYMPHOCYTES NFR BLD: 44.6 % (ref 12–49)
MCH RBC QN AUTO: 28.3 PG (ref 26–34)
MCHC RBC AUTO-ENTMCNC: 29.4 G/DL (ref 30–36.5)
MCV RBC AUTO: 96.3 FL (ref 80–99)
MONOCYTES # BLD: 0.37 K/UL (ref 0–1)
MONOCYTES NFR BLD: 6.7 % (ref 5–13)
NEUTS SEG # BLD: 2.56 K/UL (ref 1.8–8)
NEUTS SEG NFR BLD: 46.2 % (ref 32–75)
NRBC # BLD: 0 K/UL (ref 0–0.01)
NRBC BLD-RTO: 0 PER 100 WBC
PLATELET # BLD AUTO: 184 K/UL (ref 150–400)
PMV BLD AUTO: 10.8 FL (ref 8.9–12.9)
PSA SERPL-MCNC: 1.9 NG/ML (ref 0–4)
RBC # BLD AUTO: 4.35 M/UL (ref 4.1–5.7)
TRIGL SERPL-MCNC: 98 MG/DL (ref 0–149)
TSH SERPL DL<=0.005 MIU/L-ACNC: 2.68 UIU/ML (ref 0.45–4.5)
VLDLC SERPL CALC-MCNC: 19 MG/DL (ref 5–40)
WBC # BLD AUTO: 5.5 K/UL (ref 4.1–11.1)

## 2025-09-04 ENCOUNTER — TELEPHONE (OUTPATIENT)
Facility: CLINIC | Age: 70
End: 2025-09-04

## (undated) DEVICE — SEALANT HEMOSTAT W/THROM 8ML -- SURGIFLO MATRIX

## (undated) DEVICE — STRAP,POSITIONING,KNEE/BODY,FOAM,4X60": Brand: MEDLINE

## (undated) DEVICE — TOWEL SURG W17XL27IN STD BLU COT NONFENESTRATED PREWASHED

## (undated) DEVICE — TOTAL TRAY, 16FR 10ML SIL FOLEY, URN: Brand: MEDLINE

## (undated) DEVICE — DERMABOND SKIN ADH 0.7ML -- DERMABOND ADVANCED 12/BX

## (undated) DEVICE — SUTURE VCRL SZ 3-0 L27IN ABSRB UD L26MM SH 1/2 CIR J416H

## (undated) DEVICE — TRAY PREP DRY W/ PREM GLV 2 APPL 6 SPNG 2 UNDPD 1 OVERWRAP

## (undated) DEVICE — SUTURE MCRYL SZ 4-0 L27IN ABSRB UD L19MM PS-2 1/2 CIR PRIM Y426H

## (undated) DEVICE — SYR 5ML 1/5 GRAD LL NSAF LF --

## (undated) DEVICE — Z DISCONTINUED USE 2425483 (LOW STOCK PER MEDLINE) TAPE UMB L18IN DIA1/8IN WHT COT NONABSORBABLE W/O NDL FOR

## (undated) DEVICE — VASCULAR-RICHMOND-LF: Brand: MEDLINE INDUSTRIES, INC.

## (undated) DEVICE — MAGNETIC INSTR DRAPE 20X16: Brand: MEDLINE INDUSTRIES, INC.

## (undated) DEVICE — NEEDLE HYPO 22GA L1.5IN BLK S STL HUB POLYPR SHLD REG BVL

## (undated) DEVICE — SUTURE PROL SZ 5-0 L24IN NONABSORBABLE BLU RB-2 L13IN 1/2 8554H

## (undated) DEVICE — REM POLYHESIVE ADULT PATIENT RETURN ELECTRODE: Brand: VALLEYLAB

## (undated) DEVICE — RESERVOIR,SUCTION,100CC,SILICONE: Brand: MEDLINE

## (undated) DEVICE — DRAPE,REIN 53X77,STERILE: Brand: MEDLINE

## (undated) DEVICE — BLADE ASSEMB CLP HAIR FINE --

## (undated) DEVICE — CATHETER,URETHRAL,REDRUBBER,STRL,18FR: Brand: MEDLINE

## (undated) DEVICE — SPONGE DRAIN NONWOVEN 4X4IN -- 2/PK

## (undated) DEVICE — INFECTION CONTROL KIT SYS

## (undated) DEVICE — DRAIN SURG FLAT W7MMXL20CM FULL PERF

## (undated) DEVICE — STERILE POLYISOPRENE POWDER-FREE SURGICAL GLOVES: Brand: PROTEXIS

## (undated) DEVICE — COVER LT HNDL PLAS RIG 1 PER PK

## (undated) DEVICE — BLANKET WRM W35.4XL86.6IN FULL UNDERBODY + FORC AIR